# Patient Record
Sex: FEMALE | Race: OTHER | Employment: OTHER | URBAN - METROPOLITAN AREA
[De-identification: names, ages, dates, MRNs, and addresses within clinical notes are randomized per-mention and may not be internally consistent; named-entity substitution may affect disease eponyms.]

---

## 2018-01-19 ENCOUNTER — TELEPHONE (OUTPATIENT)
Dept: OBGYN CLINIC | Facility: CLINIC | Age: 68
End: 2018-01-19

## 2018-01-19 ENCOUNTER — OFFICE VISIT (OUTPATIENT)
Dept: OBGYN CLINIC | Facility: CLINIC | Age: 68
End: 2018-01-19

## 2018-01-19 VITALS
WEIGHT: 169 LBS | HEART RATE: 77 BPM | DIASTOLIC BLOOD PRESSURE: 80 MMHG | SYSTOLIC BLOOD PRESSURE: 165 MMHG | BODY MASS INDEX: 31.1 KG/M2 | HEIGHT: 61.7 IN

## 2018-01-19 DIAGNOSIS — I10 ESSENTIAL HYPERTENSION: ICD-10-CM

## 2018-01-19 DIAGNOSIS — Z01.419 ENCOUNTER FOR GYNECOLOGICAL EXAMINATION WITHOUT ABNORMAL FINDING: Primary | ICD-10-CM

## 2018-01-19 DIAGNOSIS — Z13.820 SCREENING FOR OSTEOPOROSIS: ICD-10-CM

## 2018-01-19 DIAGNOSIS — Z12.31 VISIT FOR SCREENING MAMMOGRAM: ICD-10-CM

## 2018-01-19 PROCEDURE — 99387 INIT PM E/M NEW PAT 65+ YRS: CPT | Performed by: OBSTETRICS & GYNECOLOGY

## 2018-01-19 RX ORDER — METOPROLOL SUCCINATE 25 MG/1
25 TABLET, EXTENDED RELEASE ORAL DAILY
COMMUNITY
End: 2018-02-05 | Stop reason: DRUGHIGH

## 2018-01-19 NOTE — PROGRESS NOTES
Nella Gutiérrez is a 74year old female  No LMP recorded. Patient is postmenopausal. Patient presents with:  Gyn Exam: ANNUAL EXAM-- new pt. Prior Rocio Divers. has not taken 2nd HTN med. Has appt w/ new PCP . No  bleed  .     OBSTETRICS HISTORY: LEVOTHYROXINE SODIUM OR, Take by mouth., Disp: , Rfl:   •  Albuterol Sulfate HFA (PROAIR HFA) 108 (90 BASE) MCG/ACT Inhalation Aero Soln, Inhale 2 puffs into the lungs every 6 (six) hours as needed for Wheezing., Disp: , Rfl:     ALLERGIES:    Seasonal abnormal discharge  Cervix:    normal without tenderness on motion  Uterus:    normal in size, contour, position, mobility, without tenderness  Adnexa:   normal without masses or tenderness  Perineum:   normal  Anus: no hemorroids     Assessment & Plan:  C

## 2018-01-19 NOTE — TELEPHONE ENCOUNTER
Need Dr. Sebastian Prince pap reports from her office chart -- told that [facility] bought all her records -- please obtain pap reports

## 2018-01-22 LAB — HPV I/H RISK 1 DNA SPEC QL NAA+PROBE: NEGATIVE

## 2018-01-23 NOTE — PROGRESS NOTES
Send letter: Normal pap &  Negative high risk HPV. Continue with annual breast / pelvic exams (no more paps).

## 2018-01-24 NOTE — PROGRESS NOTES
Normal pap &  Negative high risk HPV. Continue with annual breast / pelvic exams (no more paps). Letter Sent via mail.

## 2018-02-05 ENCOUNTER — OFFICE VISIT (OUTPATIENT)
Dept: FAMILY MEDICINE CLINIC | Facility: CLINIC | Age: 68
End: 2018-02-05

## 2018-02-05 VITALS
WEIGHT: 172 LBS | SYSTOLIC BLOOD PRESSURE: 163 MMHG | HEART RATE: 77 BPM | DIASTOLIC BLOOD PRESSURE: 77 MMHG | TEMPERATURE: 100 F | HEIGHT: 62 IN | BODY MASS INDEX: 31.65 KG/M2

## 2018-02-05 DIAGNOSIS — J45.20 MILD INTERMITTENT REACTIVE AIRWAY DISEASE WITHOUT COMPLICATION: ICD-10-CM

## 2018-02-05 DIAGNOSIS — Z12.11 COLON CANCER SCREENING: ICD-10-CM

## 2018-02-05 DIAGNOSIS — Z23 NEED FOR 23-POLYVALENT PNEUMOCOCCAL POLYSACCHARIDE VACCINE: ICD-10-CM

## 2018-02-05 DIAGNOSIS — E66.9 OBESITY (BMI 30.0-34.9): ICD-10-CM

## 2018-02-05 DIAGNOSIS — I10 ESSENTIAL HYPERTENSION: ICD-10-CM

## 2018-02-05 DIAGNOSIS — E03.9 HYPOTHYROIDISM, UNSPECIFIED TYPE: ICD-10-CM

## 2018-02-05 DIAGNOSIS — Z00.00 WELL ADULT EXAM: Primary | ICD-10-CM

## 2018-02-05 DIAGNOSIS — Z23 NEED FOR TDAP VACCINATION: ICD-10-CM

## 2018-02-05 PROBLEM — E66.811 OBESITY (BMI 30.0-34.9): Status: ACTIVE | Noted: 2018-02-05

## 2018-02-05 PROCEDURE — 90471 IMMUNIZATION ADMIN: CPT | Performed by: FAMILY MEDICINE

## 2018-02-05 PROCEDURE — 99387 INIT PM E/M NEW PAT 65+ YRS: CPT | Performed by: FAMILY MEDICINE

## 2018-02-05 PROCEDURE — 90732 PPSV23 VACC 2 YRS+ SUBQ/IM: CPT | Performed by: FAMILY MEDICINE

## 2018-02-05 RX ORDER — METOPROLOL SUCCINATE 50 MG/1
50 TABLET, EXTENDED RELEASE ORAL DAILY
Qty: 90 TABLET | Refills: 0 | Status: SHIPPED | OUTPATIENT
Start: 2018-02-05 | End: 2018-04-07

## 2018-02-05 RX ORDER — LEVOTHYROXINE SODIUM 0.05 MG/1
50 TABLET ORAL
COMMUNITY
End: 2018-10-01

## 2018-02-05 RX ORDER — LISINOPRIL AND HYDROCHLOROTHIAZIDE 25; 20 MG/1; MG/1
1 TABLET ORAL DAILY
Qty: 90 TABLET | Refills: 0 | Status: SHIPPED | OUTPATIENT
Start: 2018-02-05 | End: 2018-03-03 | Stop reason: ALTCHOICE

## 2018-02-05 NOTE — PROGRESS NOTES
HPI:   Tavares Willams is a 74year old female who presents for a complete physical exam. Symptoms: is menopausal.    Wt Readings from Last 6 Encounters:  02/05/18 : 172 lb (78 kg)  01/19/18 : 169 lb (76.7 kg)  12/21/16 : 170 lb (77.1 kg)  01/28/14 : 166 lb (7 GENERAL: feels well otherwise  SKIN: denies any unusual skin lesions  EYES:denies blurred vision or double vision  HEENT: denies nasal congestion, sinus pain or ST  LUNGS: denies shortness of breath with exertion  CARDIOVASCULAR: denies chest pain on exe up with dentist every 6 months  Follow up with eye doctor yearly  Exercise for 30mins most days of the week  1/2 - 1 lb weight loss per week: goal 5-10 pounds  Yearly flu shot  Tetanus booster every 10 years    - ALT (SGPT); Future  - AST (SGOT);  Future  -

## 2018-02-10 ENCOUNTER — TELEPHONE (OUTPATIENT)
Dept: FAMILY MEDICINE CLINIC | Facility: CLINIC | Age: 68
End: 2018-02-10

## 2018-02-10 NOTE — TELEPHONE ENCOUNTER
Lisset Kim from [facility] calling because pt is there trying to get labs but there is no order. Pt states that during a recent visit with Dr. Dirk Ashby that she was told by MD that she needed labs done. Please advise.

## 2018-02-17 ENCOUNTER — HOSPITAL ENCOUNTER (OUTPATIENT)
Dept: BONE DENSITY | Age: 68
Discharge: HOME | End: 2018-02-17
Attending: OBSTETRICS & GYNECOLOGY
Payer: COMMERCIAL

## 2018-02-17 ENCOUNTER — HOSPITAL ENCOUNTER (OUTPATIENT)
Dept: MAMMOGRAPHY | Age: 68
Discharge: HOME | End: 2018-02-17
Attending: OBSTETRICS & GYNECOLOGY
Payer: COMMERCIAL

## 2018-02-17 DIAGNOSIS — Z13.820 SCREENING FOR OSTEOPOROSIS: ICD-10-CM

## 2018-02-17 DIAGNOSIS — Z12.31 VISIT FOR SCREENING MAMMOGRAM: ICD-10-CM

## 2018-02-17 PROCEDURE — 77080 DXA BONE DENSITY AXIAL: CPT | Performed by: OBSTETRICS & GYNECOLOGY

## 2018-02-17 PROCEDURE — 77067 SCR MAMMO BI INCL CAD: CPT | Performed by: OBSTETRICS & GYNECOLOGY

## 2018-02-19 ENCOUNTER — TELEPHONE (OUTPATIENT)
Dept: FAMILY MEDICINE CLINIC | Facility: CLINIC | Age: 68
End: 2018-02-19

## 2018-02-19 DIAGNOSIS — N28.9 DECREASED RENAL FUNCTION: Primary | ICD-10-CM

## 2018-02-19 DIAGNOSIS — D64.9 MILD ANEMIA: ICD-10-CM

## 2018-02-19 DIAGNOSIS — E55.9 VITAMIN D DEFICIENCY: ICD-10-CM

## 2018-02-19 NOTE — TELEPHONE ENCOUNTER
Received blood test results from [facility]. done on February 10, 2018. Patient appears to have mild anemia and therefore encourage patient to get the stool blood test.  This has already been ordered.   Her kidney function appears to be slightly elevated wit

## 2018-02-22 NOTE — TELEPHONE ENCOUNTER
Patient notified of lab results. Patient verbalizes understanding of Dr. Deleon Schools instructions.      Instructions for lab results sent in a My Chart message per patient request.

## 2018-03-03 ENCOUNTER — OFFICE VISIT (OUTPATIENT)
Dept: FAMILY MEDICINE CLINIC | Facility: CLINIC | Age: 68
End: 2018-03-03

## 2018-03-03 VITALS
WEIGHT: 172 LBS | HEIGHT: 61.75 IN | SYSTOLIC BLOOD PRESSURE: 130 MMHG | BODY MASS INDEX: 31.65 KG/M2 | DIASTOLIC BLOOD PRESSURE: 72 MMHG | HEART RATE: 66 BPM

## 2018-03-03 DIAGNOSIS — N28.9 DECREASED RENAL FUNCTION: ICD-10-CM

## 2018-03-03 DIAGNOSIS — D64.9 MILD ANEMIA: Primary | ICD-10-CM

## 2018-03-03 PROCEDURE — 99214 OFFICE O/P EST MOD 30 MIN: CPT | Performed by: FAMILY MEDICINE

## 2018-03-03 PROCEDURE — 99212 OFFICE O/P EST SF 10 MIN: CPT | Performed by: FAMILY MEDICINE

## 2018-03-03 RX ORDER — LISINOPRIL 40 MG/1
40 TABLET ORAL DAILY
Qty: 90 TABLET | Refills: 0 | Status: SHIPPED | OUTPATIENT
Start: 2018-03-03 | End: 2018-04-07

## 2018-03-04 NOTE — PROGRESS NOTES
HPI:    Patient ID: Ori Chen is a 74year old female. HPI     Patient here today to discuss test results. She states her colonoscopy is up-to-date. She had it in February 2014. It was normal per patient and is due in 10 years.     Review of Syste check in the next 3-4 weeks. Follow-up in 3 months. Recheck BMP 3 months.  - BASIC METABOLIC PANEL (8);  Future      Orders Placed This Encounter      VITAMIN B12 [927][Q]      CBC [6399] [Q]      IRON AND TIBC [7573] [Q]      BASIC METABOLIC PANEL [17792

## 2018-04-07 DIAGNOSIS — I10 ESSENTIAL HYPERTENSION: Primary | ICD-10-CM

## 2018-04-10 RX ORDER — LISINOPRIL 40 MG/1
40 TABLET ORAL DAILY
Qty: 90 TABLET | Refills: 0 | Status: SHIPPED
Start: 2018-04-10 | End: 2018-06-30

## 2018-04-10 RX ORDER — METOPROLOL SUCCINATE 50 MG/1
50 TABLET, EXTENDED RELEASE ORAL DAILY
Qty: 90 TABLET | Refills: 0 | Status: SHIPPED
Start: 2018-04-10 | End: 2018-06-30

## 2018-04-10 NOTE — TELEPHONE ENCOUNTER
From: Fernando So  Sent: 4/7/2018 8:56 AM CDT  Subject: Medication Renewal Request    Fernando So would like a refill of the following medications:     Metoprolol Succinate ER 50 MG Oral Tablet 24 Hr [Marcell Cardoza MD]     lisinopril 40 MG Oral Tab [Marcell Cardoza MD]    Preferred pharmacy: [address]  [phone], [phone]

## 2018-06-30 ENCOUNTER — OFFICE VISIT (OUTPATIENT)
Dept: FAMILY MEDICINE CLINIC | Facility: CLINIC | Age: 68
End: 2018-06-30

## 2018-06-30 VITALS
TEMPERATURE: 98 F | WEIGHT: 167 LBS | BODY MASS INDEX: 30.73 KG/M2 | HEART RATE: 64 BPM | HEIGHT: 62 IN | SYSTOLIC BLOOD PRESSURE: 167 MMHG | DIASTOLIC BLOOD PRESSURE: 74 MMHG

## 2018-06-30 DIAGNOSIS — E03.9 HYPOTHYROIDISM, UNSPECIFIED TYPE: Primary | ICD-10-CM

## 2018-06-30 DIAGNOSIS — D64.9 MILD ANEMIA: ICD-10-CM

## 2018-06-30 DIAGNOSIS — I10 ESSENTIAL HYPERTENSION: ICD-10-CM

## 2018-06-30 DIAGNOSIS — N28.9 DECREASED RENAL FUNCTION: ICD-10-CM

## 2018-06-30 PROCEDURE — 99212 OFFICE O/P EST SF 10 MIN: CPT | Performed by: FAMILY MEDICINE

## 2018-06-30 PROCEDURE — 99214 OFFICE O/P EST MOD 30 MIN: CPT | Performed by: FAMILY MEDICINE

## 2018-06-30 RX ORDER — HYDROCHLOROTHIAZIDE 25 MG/1
25 TABLET ORAL EVERY MORNING
Qty: 90 TABLET | Refills: 0 | Status: SHIPPED | OUTPATIENT
Start: 2018-06-30 | End: 2018-09-22

## 2018-06-30 RX ORDER — LISINOPRIL 40 MG/1
40 TABLET ORAL DAILY
Qty: 90 TABLET | Refills: 0 | Status: SHIPPED | OUTPATIENT
Start: 2018-06-30 | End: 2018-08-18

## 2018-06-30 RX ORDER — METOPROLOL SUCCINATE 50 MG/1
50 TABLET, EXTENDED RELEASE ORAL DAILY
Qty: 90 TABLET | Refills: 0 | Status: SHIPPED | OUTPATIENT
Start: 2018-06-30 | End: 2018-10-27

## 2018-06-30 NOTE — PROGRESS NOTES
Ori Chen is a 74year old female. HPI:   Patient presents for recheck of her hypertension.  Pt has been taking medications as instructed, no medication side effects,     Denies any complaints    Feels well  Had labs in March 2018    Wt Readings from exertion  CARDIOVASCULAR: denies chest pain on exertion  GI: denies abdominal pain and denies heartburn  NEURO: denies headaches    EXAM:   BP (!) 167/74   Pulse 64   Temp 98 °F (36.7 °C) (Oral)   Ht 5' 2\" (1.575 m)   Wt 167 lb (75.8 kg)   BMI 30.54 kg/m²

## 2018-08-18 DIAGNOSIS — I10 ESSENTIAL HYPERTENSION: ICD-10-CM

## 2018-08-18 RX ORDER — LISINOPRIL 40 MG/1
40 TABLET ORAL DAILY
Qty: 30 TABLET | Refills: 0 | Status: SHIPPED | OUTPATIENT
Start: 2018-08-18 | End: 2018-08-27

## 2018-08-18 NOTE — TELEPHONE ENCOUNTER
From: Yeimy Gilbert  Sent: 8/18/2018 9:38 AM CDT  Subject: Medication Renewal Request    Yeimy Gilbert would like a refill of the following medications:     lisinopril 40 MG Oral Tab [Marcell Cardoza MD]    Preferred pharmacy: [address].  [phone], [phone]

## 2018-08-18 NOTE — TELEPHONE ENCOUNTER
Please review and advise regarding pended refill request as unable to refill per protocol. MyChart message has been sent asking pt to complete labs ordered at [address] and also to schedule appt as per 6/30/18 OV notes was supposed to RTC in 2-3 weeks since BP was elevated.

## 2018-08-20 ENCOUNTER — TELEPHONE (OUTPATIENT)
Dept: OTHER | Age: 68
End: 2018-08-20

## 2018-08-20 NOTE — TELEPHONE ENCOUNTER
Called pt and told her we did not  receive those labs pt will call them again to refax it to us.  Pt wanted to know if she should still continue her recent medication

## 2018-08-20 NOTE — TELEPHONE ENCOUNTER
Pt states she received a message in [employer] stating she needs her labs done and f/u appt. Pt states she had her labs done at end of June at [facility], [address] states she never received results. Dr. Landon Antunez, did you receive lab results from [facility] 6/30/18?  Lab c

## 2018-08-20 NOTE — TELEPHONE ENCOUNTER
Patient is asking if she should be taking the hydrochlorothiazide because she states she was not aware that she was supposed to be taking it. Please advise.      Also Patient also states she called [employer] and they gave her another number that doctors offi

## 2018-08-20 NOTE — TELEPHONE ENCOUNTER
Dalia and was advised that they have no test results on this patient in June, last tests they have on record was from February.  Contacted patient and advised, patient will follow up with [employer].

## 2018-08-21 NOTE — TELEPHONE ENCOUNTER
pls have patient follow up for her bp and can straighten out details then  Thank you for trying to get the labs

## 2018-08-22 NOTE — TELEPHONE ENCOUNTER
Pt states was told to have labs redone by [facility]. States plan on redoing then Saturday. Scheduled BP appt for 8/27 with AMA.

## 2018-08-25 NOTE — TELEPHONE ENCOUNTER
Patient called, needs orders to be sent to [facility], only at [facility] chart, not at [facility]. 5 lab orders faxed to [facility] today at 8:49 to [phone] at patient's request.

## 2018-08-27 ENCOUNTER — OFFICE VISIT (OUTPATIENT)
Dept: FAMILY MEDICINE CLINIC | Facility: CLINIC | Age: 68
End: 2018-08-27
Payer: COMMERCIAL

## 2018-08-27 VITALS
WEIGHT: 166 LBS | HEIGHT: 62 IN | TEMPERATURE: 99 F | DIASTOLIC BLOOD PRESSURE: 71 MMHG | SYSTOLIC BLOOD PRESSURE: 147 MMHG | HEART RATE: 71 BPM | BODY MASS INDEX: 30.55 KG/M2

## 2018-08-27 DIAGNOSIS — I10 ESSENTIAL HYPERTENSION: Primary | ICD-10-CM

## 2018-08-27 DIAGNOSIS — E03.9 HYPOTHYROIDISM, UNSPECIFIED TYPE: ICD-10-CM

## 2018-08-27 DIAGNOSIS — N28.9 DECREASED RENAL FUNCTION: ICD-10-CM

## 2018-08-27 DIAGNOSIS — D64.9 MILD ANEMIA: ICD-10-CM

## 2018-08-27 PROCEDURE — 99213 OFFICE O/P EST LOW 20 MIN: CPT | Performed by: FAMILY MEDICINE

## 2018-08-27 PROCEDURE — 99212 OFFICE O/P EST SF 10 MIN: CPT | Performed by: FAMILY MEDICINE

## 2018-08-27 RX ORDER — LISINOPRIL 40 MG/1
40 TABLET ORAL DAILY
Qty: 90 TABLET | Refills: 0 | Status: SHIPPED | OUTPATIENT
Start: 2018-08-27 | End: 2019-09-16

## 2018-08-27 NOTE — PROGRESS NOTES
Cheo Vogel is a 74year old female. HPI:   Patient presents for recheck of her hypertension. Pt has been taking medications as instructed, no medication side effects,     .was taking all bp meds.   Stopped hctz few day ago as got confused if she was alvarado otherwise  SKIN: denies any unusual skin lesions or rashes  RESPIRATORY: denies shortness of breath with exertion  CARDIOVASCULAR: denies chest pain on exertion  GI: denies abdominal pain and denies heartburn  NEURO: denies headaches    EXAM:   /71

## 2018-08-29 ENCOUNTER — TELEPHONE (OUTPATIENT)
Dept: OTHER | Age: 68
End: 2018-08-29

## 2018-08-29 DIAGNOSIS — R73.9 HYPERGLYCEMIA: ICD-10-CM

## 2018-08-29 DIAGNOSIS — N18.30 STAGE 3 CHRONIC KIDNEY DISEASE (HCC): Primary | ICD-10-CM

## 2018-08-29 NOTE — TELEPHONE ENCOUNTER
Pt stated she called [facility]/advised they faxed results yesterday       Were Results received ?   Please reply to pool: RENETTA Ayala

## 2018-08-30 NOTE — TELEPHONE ENCOUNTER
Routed to clinic pool and provider to confirm if results were received.      Please reply to pool: RENETTA Menjivar

## 2018-09-06 PROBLEM — N18.30 STAGE 3 CHRONIC KIDNEY DISEASE (HCC): Status: ACTIVE | Noted: 2018-09-06

## 2018-09-06 NOTE — TELEPHONE ENCOUNTER
Labs received from [facility] dated August 25, 2018. Blood counts normal including hemoglobin is stable at 11.4. Iron levels are good. Thyroid is normal.  Kidney function still remains low and unchanged when compared to labs from before.   Creatinine lev

## 2018-09-06 NOTE — TELEPHONE ENCOUNTER
Called pt and informed her of message below pt stated she had a copy of her results already.  Mailed out her A1C order

## 2018-09-24 RX ORDER — HYDROCHLOROTHIAZIDE 25 MG/1
TABLET ORAL
Qty: 90 TABLET | Refills: 0 | Status: SHIPPED | OUTPATIENT
Start: 2018-09-24 | End: 2018-12-28

## 2018-10-01 RX ORDER — LEVOTHYROXINE SODIUM 0.05 MG/1
50 TABLET ORAL
Qty: 90 TABLET | Refills: 3 | Status: SHIPPED | OUTPATIENT
Start: 2018-10-01 | End: 2019-09-16

## 2018-10-01 NOTE — TELEPHONE ENCOUNTER
please advise as does not meet RN protocol for refill criteria  Listed as historical    Hypothyroid Medications  Protocol Criteria:  Appointment scheduled in the past 12 months or the next 3 months  TSH resulted in the past 12 months that is normal  Recent Outpatient Visits            1 month ago Essential hypertension    [address] [facility]    Office Visit    3 months ago Hypothyroidism, unspecified type    [address] [facility]    Office Visit    7 months ago Mild anemia    [address] [facility]    Office Visit    7 months ago Well adult exam    [address] [facility]    Office Visit    8 months ago Encounter for gynecological examination without abnormal finding    [facility] for Apryl Sandhu MD    Office Visit        Future Appointments       Provider Department Appt Notes    In 1 week Sakshi Jamil MD [facility] [place], [facility], [address], El[place] 1 mo        No results found for: [place], [place]

## 2018-10-03 ENCOUNTER — HOSPITAL ENCOUNTER (OUTPATIENT)
Dept: GENERAL RADIOLOGY | Age: 68
Discharge: HOME | End: 2018-10-03
Attending: FAMILY MEDICINE

## 2018-10-03 ENCOUNTER — OFFICE VISIT (OUTPATIENT)
Dept: OCCUPATIONAL MEDICINE | Age: 68
End: 2018-10-03
Attending: FAMILY MEDICINE

## 2018-10-03 DIAGNOSIS — S63.91XA HAND SPRAIN, RIGHT, INITIAL ENCOUNTER: Primary | ICD-10-CM

## 2018-10-03 DIAGNOSIS — S63.501A WRIST SPRAIN, RIGHT, INITIAL ENCOUNTER: ICD-10-CM

## 2018-10-03 DIAGNOSIS — S63.91XA HAND SPRAIN, RIGHT, INITIAL ENCOUNTER: ICD-10-CM

## 2018-10-03 PROCEDURE — 73110 X-RAY EXAM OF WRIST: CPT | Performed by: FAMILY MEDICINE

## 2018-10-03 PROCEDURE — 73130 X-RAY EXAM OF HAND: CPT | Performed by: FAMILY MEDICINE

## 2018-10-05 ENCOUNTER — APPOINTMENT (OUTPATIENT)
Dept: OCCUPATIONAL MEDICINE | Age: 68
End: 2018-10-05
Attending: FAMILY MEDICINE

## 2018-10-09 ENCOUNTER — OFFICE VISIT (OUTPATIENT)
Dept: FAMILY MEDICINE CLINIC | Facility: CLINIC | Age: 68
End: 2018-10-09
Payer: COMMERCIAL

## 2018-10-09 VITALS
HEIGHT: 62 IN | BODY MASS INDEX: 30.36 KG/M2 | WEIGHT: 165 LBS | DIASTOLIC BLOOD PRESSURE: 78 MMHG | HEART RATE: 66 BPM | TEMPERATURE: 98 F | SYSTOLIC BLOOD PRESSURE: 151 MMHG

## 2018-10-09 DIAGNOSIS — I10 ESSENTIAL HYPERTENSION: Primary | ICD-10-CM

## 2018-10-09 PROCEDURE — 99213 OFFICE O/P EST LOW 20 MIN: CPT | Performed by: FAMILY MEDICINE

## 2018-10-09 PROCEDURE — 99212 OFFICE O/P EST SF 10 MIN: CPT | Performed by: FAMILY MEDICINE

## 2018-10-09 NOTE — PROGRESS NOTES
Tyler Salamanca is a 79year old female. HPI:   Patient presents for recheck of her hypertension.  Pt has been taking medications as instructed, no medication side effects,  Bps at home usu 140/70    Has HbA1C done at [facility] 9/29/18- 5.6 % normal    Wt Georgina Mccracken with exertion  CARDIOVASCULAR: denies chest pain on exertion  GI: denies abdominal pain and denies heartburn  NEURO: denies headaches    EXAM:   /78   Pulse 73   Temp 98.4 °F (36.9 °C) (Oral)   Ht 5' 2\" (1.575 m)   Wt 165 lb (74.8 kg)   BMI 30.18 kg

## 2018-10-12 ENCOUNTER — APPOINTMENT (OUTPATIENT)
Dept: OCCUPATIONAL MEDICINE | Age: 68
End: 2018-10-12
Attending: FAMILY MEDICINE

## 2018-10-19 ENCOUNTER — APPOINTMENT (OUTPATIENT)
Dept: OCCUPATIONAL MEDICINE | Age: 68
End: 2018-10-19
Attending: ORTHOPAEDIC SURGERY

## 2018-10-27 DIAGNOSIS — I10 ESSENTIAL HYPERTENSION: ICD-10-CM

## 2018-10-28 RX ORDER — METOPROLOL SUCCINATE 50 MG/1
TABLET, EXTENDED RELEASE ORAL
Qty: 90 TABLET | Refills: 0 | Status: SHIPPED | OUTPATIENT
Start: 2018-10-28 | End: 2018-12-28

## 2018-12-28 DIAGNOSIS — I10 ESSENTIAL HYPERTENSION: ICD-10-CM

## 2018-12-29 RX ORDER — METOPROLOL SUCCINATE 50 MG/1
TABLET, EXTENDED RELEASE ORAL
Qty: 90 TABLET | Refills: 0 | Status: SHIPPED | OUTPATIENT
Start: 2018-12-29 | End: 2019-04-01

## 2018-12-29 RX ORDER — LISINOPRIL 40 MG/1
TABLET ORAL
Qty: 90 TABLET | Refills: 0 | Status: SHIPPED | OUTPATIENT
Start: 2018-12-29 | End: 2019-02-25

## 2018-12-29 RX ORDER — HYDROCHLOROTHIAZIDE 25 MG/1
TABLET ORAL
Qty: 90 TABLET | Refills: 0 | Status: SHIPPED | OUTPATIENT
Start: 2018-12-29 | End: 2019-04-14

## 2019-02-25 ENCOUNTER — APPOINTMENT (OUTPATIENT)
Dept: LAB | Age: 69
End: 2019-02-25
Attending: FAMILY MEDICINE
Payer: MEDICARE

## 2019-02-25 ENCOUNTER — OFFICE VISIT (OUTPATIENT)
Dept: FAMILY MEDICINE CLINIC | Facility: CLINIC | Age: 69
End: 2019-02-25
Payer: COMMERCIAL

## 2019-02-25 VITALS
SYSTOLIC BLOOD PRESSURE: 176 MMHG | TEMPERATURE: 98 F | BODY MASS INDEX: 31.1 KG/M2 | HEIGHT: 62 IN | WEIGHT: 169 LBS | DIASTOLIC BLOOD PRESSURE: 83 MMHG | HEART RATE: 64 BPM

## 2019-02-25 DIAGNOSIS — E55.9 VITAMIN D DEFICIENCY: ICD-10-CM

## 2019-02-25 DIAGNOSIS — Z00.00 ENCOUNTER FOR ANNUAL HEALTH EXAMINATION: ICD-10-CM

## 2019-02-25 DIAGNOSIS — Z80.3 FH: BREAST CANCER IN FIRST DEGREE RELATIVE: ICD-10-CM

## 2019-02-25 DIAGNOSIS — D64.9 MILD ANEMIA: ICD-10-CM

## 2019-02-25 DIAGNOSIS — Z01.419 WELL WOMAN EXAM WITH ROUTINE GYNECOLOGICAL EXAM: ICD-10-CM

## 2019-02-25 DIAGNOSIS — I83.813 VARICOSE VEINS OF BOTH LOWER EXTREMITIES WITH PAIN: ICD-10-CM

## 2019-02-25 DIAGNOSIS — I10 ESSENTIAL HYPERTENSION: ICD-10-CM

## 2019-02-25 DIAGNOSIS — Z11.59 NEED FOR HEPATITIS C SCREENING TEST: ICD-10-CM

## 2019-02-25 DIAGNOSIS — Z00.00 MEDICARE ANNUAL WELLNESS VISIT, SUBSEQUENT: Primary | ICD-10-CM

## 2019-02-25 DIAGNOSIS — Z23 NEED FOR VACCINATION: ICD-10-CM

## 2019-02-25 DIAGNOSIS — E03.9 HYPOTHYROIDISM, UNSPECIFIED TYPE: ICD-10-CM

## 2019-02-25 DIAGNOSIS — Z12.31 ENCOUNTER FOR SCREENING MAMMOGRAM FOR BREAST CANCER: ICD-10-CM

## 2019-02-25 DIAGNOSIS — N18.30 STAGE 3 CHRONIC KIDNEY DISEASE (HCC): ICD-10-CM

## 2019-02-25 LAB
ALBUMIN SERPL-MCNC: 3.7 G/DL (ref 3.4–5)
ALBUMIN/GLOB SERPL: 0.9 {RATIO} (ref 1–2)
ALP LIVER SERPL-CCNC: 92 U/L (ref 55–142)
ALT SERPL-CCNC: 17 U/L (ref 13–56)
ANION GAP SERPL CALC-SCNC: 5 MMOL/L (ref 0–18)
AST SERPL-CCNC: 16 U/L (ref 15–37)
BILIRUB SERPL-MCNC: 0.4 MG/DL (ref 0.1–2)
BUN BLD-MCNC: 40 MG/DL (ref 7–18)
BUN/CREAT SERPL: 28.2 (ref 10–20)
CALCIUM BLD-MCNC: 9.6 MG/DL (ref 8.5–10.1)
CHLORIDE SERPL-SCNC: 108 MMOL/L (ref 98–107)
CO2 SERPL-SCNC: 27 MMOL/L (ref 21–32)
CREAT BLD-MCNC: 1.42 MG/DL (ref 0.55–1.02)
GLOBULIN PLAS-MCNC: 4.1 G/DL (ref 2.8–4.4)
GLUCOSE BLD-MCNC: 97 MG/DL (ref 70–99)
M PROTEIN MFR SERPL ELPH: 7.8 G/DL (ref 6.4–8.2)
OSMOLALITY SERPL CALC.SUM OF ELEC: 300 MOSM/KG (ref 275–295)
POTASSIUM SERPL-SCNC: 4.9 MMOL/L (ref 3.5–5.1)
SODIUM SERPL-SCNC: 140 MMOL/L (ref 136–145)
TSI SER-ACNC: 2.59 MIU/ML (ref 0.36–3.74)

## 2019-02-25 PROCEDURE — 84443 ASSAY THYROID STIM HORMONE: CPT

## 2019-02-25 PROCEDURE — 90471 IMMUNIZATION ADMIN: CPT | Performed by: FAMILY MEDICINE

## 2019-02-25 PROCEDURE — 93005 ELECTROCARDIOGRAM TRACING: CPT

## 2019-02-25 PROCEDURE — 86803 HEPATITIS C AB TEST: CPT

## 2019-02-25 PROCEDURE — 99397 PER PM REEVAL EST PAT 65+ YR: CPT | Performed by: FAMILY MEDICINE

## 2019-02-25 PROCEDURE — 93010 ELECTROCARDIOGRAM REPORT: CPT | Performed by: FAMILY MEDICINE

## 2019-02-25 PROCEDURE — 90670 PCV13 VACCINE IM: CPT | Performed by: FAMILY MEDICINE

## 2019-02-25 PROCEDURE — 36415 COLL VENOUS BLD VENIPUNCTURE: CPT

## 2019-02-25 PROCEDURE — 80053 COMPREHEN METABOLIC PANEL: CPT

## 2019-02-25 NOTE — PATIENT INSTRUCTIONS
Radha Wells's SCREENING SCHEDULE   Tests on this list are recommended by your physician but may not be covered, or covered at this frequency, by your insurer. Please check with your insurance carrier before scheduling to verify coverage.    PREVENTATIVE S for this or any previous visit. No flowsheet data found. Fecal Occult Blood   Covered Annually No results found for: FOB, OCCULTSTOOL No flowsheet data found.      Barium Enema-   uncomfortable but covered  Covered but uncomfortable   Glaucoma Screening birthday    Hepatitis B for Moderate/High Risk       No orders found for this or any previous visit.  Medium/high risk factors:   End-stage renal disease   Hemophiliacs who received Factor VIII or IX concentrates   Clients of institutions for the mentally r problems worse. Your healthcare provider is recommending a low-salt (also called low-sodium) diet for you. Your total daily allowance of salt is 1,500 to 2,300 milligrams (mg). It is less than 1 teaspoon of table salt.  This means you can have only about 50 sardines, or anchovies. Seasonings and spices  Ok: Most seasonings are okay.  Good substitutes for salt include: fresh herb blends, hot sauce, lemon, garlic, arnett, vinegar, dry mustard, parsley, cilantro, horseradish, tomato paste, regular margarine, mota

## 2019-02-25 NOTE — PROGRESS NOTES
HPI:   Nella Gutiérrez is a 79year old female who presents for a Medicare Subsequent Annual Wellness visit (Pt already had Initial Annual Wellness).       Annual Physical due on 02/25/2020        Fall/Risk Assessment   She has been screened for Falls and is the explanation and discussion of advance directives standard forms performed Face to Face with patient and Family/surrogate (if present), and forms available to patient in AVS       She does NOT have a Power of  for [employer] on file in [address] [place] [address] Rd. into the lungs every 6 (six) hours as needed for Wheezing. MEDICAL INFORMATION:   She  has a past medical history of Allergic rhinitis, Asthma, Essential hypertension, and Hypothyroidism.     She  has a past surgical history that includes colposcopy, c no murmur, rub, or gallop   Abdomen:   Soft, non-tender, bowel sounds active all four quadrants,  no masses, no organomegaly   Pelvic: See below   Extremities: Extremities normal, atraumatic, no cyanosis or edema   Pulses: 2+ and symmetric   Skin: Skin col hypertension  Elevated today as patient rushed to her appointment and was lost  Follow up 1 week  - EKG 12-LEAD; Future  - COMP METABOLIC PANEL (14); Future    3. Hypothyroidism, unspecified type    - TSH W REFLEX TO FREE T4; Future    4.  Stage 3 chronic k Sigmoidoscopy Screen every 10 years No results found for this or any previous visit. No flowsheet data found. Fecal Occult Blood Annually No results found for: FOB No flowsheet data found.     Glaucoma Screening      Ophthalmology Visit Annually: Manuel Roberts prescription benefits      SPECIFIC DISEASE MONITORING Internal Lab or Procedure External Lab or Procedure      Annual Monitoring of Persistent     Medications (ACE/ARB, digoxin diuretics, anticonvulsants.)    Potassium  Annually No results found for: K N

## 2019-02-26 ENCOUNTER — HOSPITAL ENCOUNTER (EMERGENCY)
Facility: HOSPITAL | Age: 69
Discharge: HOME | End: 2019-02-26
Attending: EMERGENCY MEDICINE
Payer: MEDICARE

## 2019-02-26 ENCOUNTER — TELEPHONE (OUTPATIENT)
Dept: FAMILY MEDICINE CLINIC | Facility: CLINIC | Age: 69
End: 2019-02-26

## 2019-02-26 VITALS
OXYGEN SATURATION: 99 % | WEIGHT: 167 LBS | RESPIRATION RATE: 18 BRPM | DIASTOLIC BLOOD PRESSURE: 69 MMHG | HEIGHT: 62 IN | HEART RATE: 78 BPM | BODY MASS INDEX: 30.73 KG/M2 | TEMPERATURE: 98 F | SYSTOLIC BLOOD PRESSURE: 159 MMHG

## 2019-02-26 DIAGNOSIS — R94.31 ABNORMAL EKG: Primary | ICD-10-CM

## 2019-02-26 LAB — HCV AB SERPL QL IA: NONREACTIVE

## 2019-02-26 PROCEDURE — 93005 ELECTROCARDIOGRAM TRACING: CPT

## 2019-02-26 PROCEDURE — 99283 EMERGENCY DEPT VISIT LOW MDM: CPT

## 2019-02-26 PROCEDURE — 93010 ELECTROCARDIOGRAM REPORT: CPT | Performed by: INTERNAL MEDICINE

## 2019-02-26 NOTE — ED PROVIDER NOTES
Patient Seen in: [facility] AND [facility] Emergency Department    History   Patient presents with:  Abnormal Result (metabolic, cardiac)      HPI    Patient presents to the ED after police arrived her house and informed her that she had to go to the ER.   Katarina reviewed from today, pertinent positives to the presenting problem noted.     Physical Exam     ED Triage Vitals   BP 02/26/19 1430 (!) 178/76   Pulse 02/26/19 1430 84   Resp 02/26/19 1430 20   Temp 02/26/19 1430 98 °F (36.7 °C)   Temp src 02/26/19 1430 Ora I personally reviewed available prior medical records for any recent pertinent discharge summaries, testing, and procedures and reviewed those reports. Complicating Factors: The patient already has has Essential hypertension;  Hypothyroidism; Obesity (BM

## 2019-02-26 NOTE — TELEPHONE ENCOUNTER
Well being check initiated with the [facility] police department.

## 2019-02-26 NOTE — TELEPHONE ENCOUNTER
Result Notes for EKG 12-LEAD     Notes recorded by Marli Sommer MD on 2/25/2019 at 8:39 PM CST  called and left a detailed message to proceed to ER due to abnormal ekg. No answer  Called and left msg on emergency spouse number as well.   Staff please foll

## 2019-02-26 NOTE — TELEPHONE ENCOUNTER
Patient called the office after the police cam to her home. She rpeorts issues with her phone. RN instructed on the EKG results and Dr. Garrett Fuelling recommendation to go to ER. Patient denies chest pain at this time.   RN advised that due to the elevated BP duri

## 2019-02-26 NOTE — TELEPHONE ENCOUNTER
LMTCB=to patient's VM and 's VM . Transfer to triage. Called work number and just keep ringing and then busy signal.  [address] and they gave me the same home number.   Called patient again and left a message to go to ER per Dr Song Tavares

## 2019-02-26 NOTE — ED INITIAL ASSESSMENT (HPI)
Pt to ER by PCP for \"abnormal EKG\" done with routine visit yesterday. Pt denies chest pain or sob. Pt denies complaints. Pt states PCP attempted to contact her since yesterday but phone was not charged.  Pt states police showed up at her door for well anna marie

## 2019-02-27 NOTE — TELEPHONE ENCOUNTER
Was at ER yesterday 2/26/18. CSS=please call patient and assists with FU OV.     Clinical Impression:  Abnormal EKG  (primary encounter diagnosis)     Disposition:  Discharge     Follow-up:  Ayden Can, [place] [phone]

## 2019-02-28 ENCOUNTER — OFFICE VISIT (OUTPATIENT)
Dept: FAMILY MEDICINE CLINIC | Facility: CLINIC | Age: 69
End: 2019-02-28
Payer: COMMERCIAL

## 2019-02-28 VITALS
HEIGHT: 62 IN | DIASTOLIC BLOOD PRESSURE: 77 MMHG | WEIGHT: 169 LBS | BODY MASS INDEX: 31.1 KG/M2 | TEMPERATURE: 98 F | SYSTOLIC BLOOD PRESSURE: 161 MMHG | HEART RATE: 70 BPM

## 2019-02-28 DIAGNOSIS — N18.30 STAGE 3 CHRONIC KIDNEY DISEASE (HCC): ICD-10-CM

## 2019-02-28 DIAGNOSIS — I10 ESSENTIAL HYPERTENSION: ICD-10-CM

## 2019-02-28 DIAGNOSIS — N28.9 DECREASED RENAL FUNCTION: ICD-10-CM

## 2019-02-28 DIAGNOSIS — Z82.49 FH: CAD (CORONARY ARTERY DISEASE): ICD-10-CM

## 2019-02-28 DIAGNOSIS — R94.31 ABNORMAL EKG: Primary | ICD-10-CM

## 2019-02-28 PROCEDURE — 99212 OFFICE O/P EST SF 10 MIN: CPT | Performed by: FAMILY MEDICINE

## 2019-02-28 PROCEDURE — 99214 OFFICE O/P EST MOD 30 MIN: CPT | Performed by: FAMILY MEDICINE

## 2019-02-28 NOTE — PROGRESS NOTES
HPI:    Patient ID: Trav Weiss is a 79year old female. HPI  Patient presents with:  ER F/U: abnormal EKG       Patient here for follow up    Seen in ER and told all ok  Discharged to follow up with pcp      C/o sneezing/ runny nose.       Review of Sys wheezes. Musculoskeletal: She exhibits no edema. Neurological: She is alert and oriented to person, place, and time. Skin: Skin is warm and dry. No rash noted.               ASSESSMENT/PLAN:   Abnormal ekg  (primary encounter diagnosis)  Essential hyp

## 2019-03-05 ENCOUNTER — APPOINTMENT (OUTPATIENT)
Dept: LAB | Facility: HOSPITAL | Age: 69
End: 2019-03-05
Attending: INTERNAL MEDICINE
Payer: MEDICARE

## 2019-03-05 ENCOUNTER — OFFICE VISIT (OUTPATIENT)
Dept: NEPHROLOGY | Facility: CLINIC | Age: 69
End: 2019-03-05
Payer: MEDICARE

## 2019-03-05 VITALS
WEIGHT: 169.81 LBS | HEIGHT: 62 IN | DIASTOLIC BLOOD PRESSURE: 77 MMHG | SYSTOLIC BLOOD PRESSURE: 170 MMHG | HEART RATE: 75 BPM | BODY MASS INDEX: 31.25 KG/M2 | TEMPERATURE: 98 F

## 2019-03-05 DIAGNOSIS — N18.30 CKD (CHRONIC KIDNEY DISEASE), STAGE III (HCC): Primary | ICD-10-CM

## 2019-03-05 DIAGNOSIS — N18.30 CKD (CHRONIC KIDNEY DISEASE), STAGE III (HCC): ICD-10-CM

## 2019-03-05 LAB
BILIRUB UR QL: NEGATIVE
CLARITY UR: CLEAR
COLOR UR: YELLOW
CREAT UR-SCNC: 45.8 MG/DL
GLUCOSE UR-MCNC: NEGATIVE MG/DL
HGB UR QL STRIP.AUTO: NEGATIVE
KETONES UR-MCNC: NEGATIVE MG/DL
LEUKOCYTE ESTERASE UR QL STRIP.AUTO: NEGATIVE
MICROALBUMIN UR-MCNC: 1.04 MG/DL
MICROALBUMIN/CREAT 24H UR-RTO: 22.7 UG/MG (ref ?–30)
NITRITE UR QL STRIP.AUTO: NEGATIVE
PH UR: 6 [PH] (ref 5–8)
PROT UR-MCNC: <5 MG/DL
PROT UR-MCNC: NEGATIVE MG/DL
SP GR UR STRIP: 1.01 (ref 1–1.03)
UROBILINOGEN UR STRIP-ACNC: <2
VIT C UR-MCNC: NEGATIVE MG/DL

## 2019-03-05 PROCEDURE — 82570 ASSAY OF URINE CREATININE: CPT

## 2019-03-05 PROCEDURE — 82043 UR ALBUMIN QUANTITATIVE: CPT

## 2019-03-05 PROCEDURE — 99204 OFFICE O/P NEW MOD 45 MIN: CPT | Performed by: INTERNAL MEDICINE

## 2019-03-05 PROCEDURE — 84156 ASSAY OF PROTEIN URINE: CPT

## 2019-03-05 PROCEDURE — 81003 URINALYSIS AUTO W/O SCOPE: CPT

## 2019-03-05 PROCEDURE — G0463 HOSPITAL OUTPT CLINIC VISIT: HCPCS | Performed by: INTERNAL MEDICINE

## 2019-03-05 NOTE — PROGRESS NOTES
Consult Requested By: Dr. Matthias Gary    Reason for Consult: CKD     HPI:     Patient is a 79 yrs old female with pmh of HTN x 20 yrs, Hypothyroidism, Asthma who presented for work up and evaluation of kidney disease     Started following with Dr. Lisandra Mitchell i tablet Rfl: 3   lisinopril 40 MG Oral Tab Take 1 tablet (40 mg total) by mouth daily.  Disp: 90 tablet Rfl: 0   Albuterol Sulfate HFA (PROAIR HFA) 108 (90 BASE) MCG/ACT Inhalation Aero Soln Inhale 2 puffs into the lungs every 6 (six) hours as needed for [facility] eGFR 39 ml/min. Albumin and calcium wnl.   - Creatinine from 2/2018 1.37 mg/dl with an eGFR 41 ml/min.    - CKD presumably secondary to hypertensive nephrosclerosis   - UA, urine albumin and protein   - further work up pending above  -avoid nephrotoxins   -

## 2019-03-05 NOTE — PATIENT INSTRUCTIONS
Urine test as ordered  Kidney ultrasound - call to make an appointment   Follow up in 4 weeks  Bring / fax old labs

## 2019-03-06 ENCOUNTER — HOSPITAL ENCOUNTER (OUTPATIENT)
Dept: MAMMOGRAPHY | Age: 69
Discharge: HOME | End: 2019-03-06
Attending: FAMILY MEDICINE
Payer: MEDICARE

## 2019-03-06 DIAGNOSIS — Z80.3 FH: BREAST CANCER IN FIRST DEGREE RELATIVE: ICD-10-CM

## 2019-03-06 DIAGNOSIS — Z12.31 ENCOUNTER FOR SCREENING MAMMOGRAM FOR BREAST CANCER: ICD-10-CM

## 2019-03-06 PROCEDURE — 77067 SCR MAMMO BI INCL CAD: CPT | Performed by: FAMILY MEDICINE

## 2019-03-06 PROCEDURE — 77063 BREAST TOMOSYNTHESIS BI: CPT | Performed by: FAMILY MEDICINE

## 2019-03-07 ENCOUNTER — HOSPITAL ENCOUNTER (OUTPATIENT)
Dept: NUCLEAR MEDICINE | Facility: HOSPITAL | Age: 69
Discharge: HOME | End: 2019-03-07
Attending: FAMILY MEDICINE
Payer: MEDICARE

## 2019-03-07 ENCOUNTER — HOSPITAL ENCOUNTER (OUTPATIENT)
Dept: CV DIAGNOSTICS | Facility: HOSPITAL | Age: 69
Discharge: HOME | End: 2019-03-07
Attending: FAMILY MEDICINE
Payer: MEDICARE

## 2019-03-07 DIAGNOSIS — I10 ESSENTIAL HYPERTENSION: ICD-10-CM

## 2019-03-07 DIAGNOSIS — Z82.49 FH: CAD (CORONARY ARTERY DISEASE): ICD-10-CM

## 2019-03-07 DIAGNOSIS — R94.31 ABNORMAL EKG: ICD-10-CM

## 2019-03-07 PROCEDURE — 93016 CV STRESS TEST SUPVJ ONLY: CPT | Performed by: FAMILY MEDICINE

## 2019-03-07 PROCEDURE — 78452 HT MUSCLE IMAGE SPECT MULT: CPT | Performed by: FAMILY MEDICINE

## 2019-03-07 PROCEDURE — 93017 CV STRESS TEST TRACING ONLY: CPT | Performed by: FAMILY MEDICINE

## 2019-03-07 PROCEDURE — 93018 CV STRESS TEST I&R ONLY: CPT | Performed by: FAMILY MEDICINE

## 2019-03-07 RX ORDER — 0.9 % SODIUM CHLORIDE 0.9 %
VIAL (ML) INJECTION
Status: COMPLETED
Start: 2019-03-07 | End: 2019-03-07

## 2019-03-07 RX ADMIN — 0.9 % SODIUM CHLORIDE 10 ML: 0.9 % VIAL (ML) INJECTION at 12:30:00

## 2019-03-08 ENCOUNTER — TELEPHONE (OUTPATIENT)
Dept: NEPHROLOGY | Facility: CLINIC | Age: 69
End: 2019-03-08

## 2019-03-11 ENCOUNTER — PATIENT OUTREACH (OUTPATIENT)
Dept: CASE MANAGEMENT | Age: 69
End: 2019-03-11

## 2019-03-11 NOTE — TELEPHONE ENCOUNTER
Lab test reviewed - BUN/Cr were 33/1.12 mg/dl in 2015 -> 27/0.99 mg/dl in 2016.  Calcium and albumin wnl    No other lab values in between 2016 - 2019

## 2019-03-11 NOTE — PROGRESS NOTES
Outreached to patient in regards to scheduling Medicare Annual exam (AHA). Patient scheduled her appt with her PCP for 03/18/2019. Thank you.

## 2019-03-12 ENCOUNTER — HOSPITAL ENCOUNTER (OUTPATIENT)
Dept: ULTRASOUND IMAGING | Age: 69
Discharge: HOME | End: 2019-03-12
Attending: FAMILY MEDICINE
Payer: MEDICARE

## 2019-03-12 DIAGNOSIS — N28.9 DECREASED RENAL FUNCTION: ICD-10-CM

## 2019-03-12 DIAGNOSIS — I10 ESSENTIAL HYPERTENSION: ICD-10-CM

## 2019-03-12 DIAGNOSIS — N18.30 STAGE 3 CHRONIC KIDNEY DISEASE (HCC): ICD-10-CM

## 2019-03-12 PROCEDURE — 76775 US EXAM ABDO BACK WALL LIM: CPT | Performed by: FAMILY MEDICINE

## 2019-03-15 ENCOUNTER — TELEPHONE (OUTPATIENT)
Dept: NEPHROLOGY | Facility: CLINIC | Age: 69
End: 2019-03-15

## 2019-03-15 NOTE — TELEPHONE ENCOUNTER
Can you put her one Wednesday march 20th at 10:40 am (have her come at 10:30 )    If not than Monday and Tuesday is fine- can double book her

## 2019-03-15 NOTE — TELEPHONE ENCOUNTER
Contacted pt to schedule appt with [facility]. Offered appt on Thursday 3/21 and Friday 3/22 but pt states she's leaving Thursday morning to go on a trip and won't be back until the end of the month.  Dr. Cedric Mcwilliams, would you like to offer her an appt sometime Mon-

## 2019-03-17 ENCOUNTER — MA CHART PREP (OUTPATIENT)
Dept: FAMILY MEDICINE CLINIC | Facility: CLINIC | Age: 69
End: 2019-03-17

## 2019-03-17 PROBLEM — Q61.3 POLYCYSTIC KIDNEY DISEASE: Status: ACTIVE | Noted: 2019-03-17

## 2019-03-18 ENCOUNTER — OFFICE VISIT (OUTPATIENT)
Dept: FAMILY MEDICINE CLINIC | Facility: CLINIC | Age: 69
End: 2019-03-18
Payer: MEDICARE

## 2019-03-18 VITALS
BODY MASS INDEX: 31.1 KG/M2 | WEIGHT: 169 LBS | HEIGHT: 62 IN | DIASTOLIC BLOOD PRESSURE: 75 MMHG | SYSTOLIC BLOOD PRESSURE: 143 MMHG | HEART RATE: 71 BPM | TEMPERATURE: 98 F

## 2019-03-18 DIAGNOSIS — I10 ESSENTIAL HYPERTENSION: Primary | ICD-10-CM

## 2019-03-18 PROCEDURE — G0463 HOSPITAL OUTPT CLINIC VISIT: HCPCS | Performed by: FAMILY MEDICINE

## 2019-03-18 PROCEDURE — 99213 OFFICE O/P EST LOW 20 MIN: CPT | Performed by: FAMILY MEDICINE

## 2019-03-18 NOTE — PROGRESS NOTES
HPI:    Patient ID: Luis Eduardo Meraz is a 79year old female. HPI  Patient presents with:  Blood Pressure: follow up     Metoprolol was increased to 100 mg daily. Patient has been taking 50 mg twice daily. Overall she feels well.   Blood pressures at home h to light. Neck: Normal range of motion. Neck supple. No thyromegaly present. Cardiovascular: Normal rate and regular rhythm. No murmur heard. Pulmonary/Chest: Effort normal and breath sounds normal. She has no wheezes.    Musculoskeletal: She exhibit

## 2019-03-20 ENCOUNTER — OFFICE VISIT (OUTPATIENT)
Dept: NEPHROLOGY | Facility: CLINIC | Age: 69
End: 2019-03-20
Payer: MEDICARE

## 2019-03-20 VITALS
SYSTOLIC BLOOD PRESSURE: 136 MMHG | WEIGHT: 170.19 LBS | HEIGHT: 62 IN | HEART RATE: 65 BPM | DIASTOLIC BLOOD PRESSURE: 65 MMHG | TEMPERATURE: 98 F | BODY MASS INDEX: 31.32 KG/M2

## 2019-03-20 DIAGNOSIS — N18.30 CKD (CHRONIC KIDNEY DISEASE), STAGE III (HCC): Primary | ICD-10-CM

## 2019-03-20 DIAGNOSIS — Q61.2 ADULT POLYCYSTIC KIDNEY DISEASE: ICD-10-CM

## 2019-03-20 PROCEDURE — G0463 HOSPITAL OUTPT CLINIC VISIT: HCPCS | Performed by: INTERNAL MEDICINE

## 2019-03-20 PROCEDURE — 99214 OFFICE O/P EST MOD 30 MIN: CPT | Performed by: INTERNAL MEDICINE

## 2019-03-20 NOTE — PATIENT INSTRUCTIONS
Bring home blood pressure readings on next visit   Follow up in 4 weeks   Drink 2-3 liters of water a day   Reduce hydrochlorothiazide to half tablet a day

## 2019-03-20 NOTE — PROGRESS NOTES
Progress Note:      Patient is a 79 yrs old female with pmh of CKD stage III, newly diagnosed polycystic kidney disease, HTN x 20 yrs, Hypothyroidism, Asthma who presented for follow up     Started following with Dr. Monico Beaulieu in 2018.  Recent Lab test showe tablet Rfl: 0   METOPROLOL SUCCINATE ER 50 MG Oral Tablet 24 Hr TAKE 1 TABLET BY MOUTH ONCE DAILY (Patient taking differently: TAKE 2 TABLETS BY MOUTH ONCE DAILY) Disp: 90 tablet Rfl: 0   Levothyroxine Sodium 50 MCG Oral Tab Take 1 tablet (50 mcg total) by non-distended, BS normal  Skin/Hair: no unusual rashes present  Back/Spine: no abnormalities noted  Musculoskeletal:  no deformities  Extremities: no edema  Neurological:  Grossly normal       ASSESSMENT/PLAN:     1. CKD stage III:   - renal US concern of

## 2019-04-01 DIAGNOSIS — I10 ESSENTIAL HYPERTENSION: ICD-10-CM

## 2019-04-01 RX ORDER — METOPROLOL SUCCINATE 50 MG/1
TABLET, EXTENDED RELEASE ORAL
Qty: 90 TABLET | Refills: 0 | Status: SHIPPED | OUTPATIENT
Start: 2019-04-01 | End: 2019-04-04

## 2019-04-02 DIAGNOSIS — I10 ESSENTIAL HYPERTENSION: ICD-10-CM

## 2019-04-02 RX ORDER — METOPROLOL SUCCINATE 50 MG/1
50 TABLET, EXTENDED RELEASE ORAL
Qty: 90 TABLET | Refills: 0 | Status: CANCELLED | OUTPATIENT
Start: 2019-04-02

## 2019-04-03 NOTE — TELEPHONE ENCOUNTER
Per med module, metoprolol rx approved yesterday for 3 month supply and sent to pharm. mychart response sent to pt at this time as well.

## 2019-04-04 ENCOUNTER — TELEPHONE (OUTPATIENT)
Dept: FAMILY MEDICINE CLINIC | Facility: CLINIC | Age: 69
End: 2019-04-04

## 2019-04-04 DIAGNOSIS — I10 ESSENTIAL HYPERTENSION: ICD-10-CM

## 2019-04-04 RX ORDER — METOPROLOL SUCCINATE 50 MG/1
TABLET, EXTENDED RELEASE ORAL
Qty: 180 TABLET | Refills: 0 | Status: SHIPPED | OUTPATIENT
Start: 2019-04-04 | End: 2019-07-03

## 2019-04-04 NOTE — TELEPHONE ENCOUNTER
Pharmacy calling to get correct script for pt's Metoprolol 50 mg. Should be take two tabs po q day. Will resend correct script to pharmacy.

## 2019-04-16 RX ORDER — HYDROCHLOROTHIAZIDE 25 MG/1
TABLET ORAL
Qty: 90 TABLET | Refills: 1 | Status: SHIPPED | OUTPATIENT
Start: 2019-04-16 | End: 2019-09-16 | Stop reason: ALTCHOICE

## 2019-04-18 RX ORDER — HYDROCHLOROTHIAZIDE 25 MG/1
TABLET ORAL
Qty: 90 TABLET | Refills: 0 | OUTPATIENT
Start: 2019-04-18

## 2019-04-22 ENCOUNTER — OFFICE VISIT (OUTPATIENT)
Dept: NEPHROLOGY | Facility: CLINIC | Age: 69
End: 2019-04-22
Payer: MEDICARE

## 2019-04-22 VITALS
SYSTOLIC BLOOD PRESSURE: 149 MMHG | HEIGHT: 62 IN | WEIGHT: 172.63 LBS | HEART RATE: 65 BPM | TEMPERATURE: 98 F | DIASTOLIC BLOOD PRESSURE: 63 MMHG | BODY MASS INDEX: 31.77 KG/M2

## 2019-04-22 DIAGNOSIS — N18.30 CKD (CHRONIC KIDNEY DISEASE), STAGE III (HCC): Primary | ICD-10-CM

## 2019-04-22 DIAGNOSIS — Q61.2 ADULT POLYCYSTIC KIDNEY DISEASE: ICD-10-CM

## 2019-04-22 PROCEDURE — 99214 OFFICE O/P EST MOD 30 MIN: CPT | Performed by: INTERNAL MEDICINE

## 2019-04-22 PROCEDURE — G0463 HOSPITAL OUTPT CLINIC VISIT: HCPCS | Performed by: INTERNAL MEDICINE

## 2019-04-22 NOTE — PROGRESS NOTES
Progress Note:      Patient is a 79 yrs old female with pmh of CKD stage III, newly diagnosed polycystic kidney disease, HTN x 20 yrs, Hypothyroidism, Asthma who presented for follow up     Started following with Dr. Lisandra Mitchell in 2018.  Recent Lab test showe ONCE DAILY IN THE MORNING (Patient taking differently: TAKE 1/2 TABLET BY MOUTH ONCE DAILY IN THE MORNING) Disp: 90 tablet Rfl: 1   Metoprolol Succinate ER 50 MG Oral Tablet 24 Hr Take two tablets by mouth daily Disp: 180 tablet Rfl: 0   Levothyroxine Sodi regular rate and rhythm  Abdomen: soft, non-tender, non-distended, BS normal  Skin/Hair: no unusual rashes present  Back/Spine: no abnormalities noted  Musculoskeletal:  no deformities  Extremities: no edema  Neurological:  Grossly normal       ASSESSMENT/

## 2019-04-22 NOTE — PROGRESS NOTES
Pt brought in home BP machine to check accuracy. First we checked manual BP and it was 172/76. The was higher than BP was initially when she came in so rechecked manual BP again and it was 170/80.  We then checked BP with her home machine and reading was 16

## 2019-05-01 ENCOUNTER — OFFICE VISIT (OUTPATIENT)
Dept: NEPHROLOGY | Facility: CLINIC | Age: 69
End: 2019-05-01
Payer: MEDICARE

## 2019-05-01 VITALS
WEIGHT: 170.81 LBS | DIASTOLIC BLOOD PRESSURE: 68 MMHG | HEART RATE: 71 BPM | SYSTOLIC BLOOD PRESSURE: 144 MMHG | HEIGHT: 62 IN | BODY MASS INDEX: 31.43 KG/M2 | TEMPERATURE: 97 F

## 2019-05-01 DIAGNOSIS — Q61.2 ADULT POLYCYSTIC KIDNEY DISEASE: ICD-10-CM

## 2019-05-01 DIAGNOSIS — N18.30 CKD (CHRONIC KIDNEY DISEASE), STAGE III (HCC): Primary | ICD-10-CM

## 2019-05-01 PROCEDURE — G0463 HOSPITAL OUTPT CLINIC VISIT: HCPCS | Performed by: INTERNAL MEDICINE

## 2019-05-01 PROCEDURE — 99214 OFFICE O/P EST MOD 30 MIN: CPT | Performed by: INTERNAL MEDICINE

## 2019-05-01 NOTE — PROGRESS NOTES
Progress Note:      Patient is a 79 yrs old female with pmh of CKD stage III, newly diagnosed polycystic kidney disease, HTN x 20 yrs, Hypothyroidism, Asthma who presented for follow up     Started following with Dr. Yaz Edmonds in 2018.  Recent Lab test showe Oral Tab TAKE 1 TABLET BY MOUTH ONCE DAILY IN THE MORNING (Patient taking differently: TAKE 1/2 TABLET BY MOUTH ONCE DAILY IN THE MORNING) Disp: 90 tablet Rfl: 1   Metoprolol Succinate ER 50 MG Oral Tablet 24 Hr Take two tablets by mouth daily Disp: 180 ta bilaterally  Cardiovascular: regular rate and rhythm  Abdomen: soft, non-tender, non-distended, BS normal  Skin/Hair: no unusual rashes present  Back/Spine: no abnormalities noted  Musculoskeletal:  no deformities  Extremities: no edema  Neurological:  [place]

## 2019-05-13 ENCOUNTER — TELEPHONE (OUTPATIENT)
Dept: NEPHROLOGY | Facility: CLINIC | Age: 69
End: 2019-05-13

## 2019-05-13 DIAGNOSIS — N18.30 CKD (CHRONIC KIDNEY DISEASE), STAGE III (HCC): Primary | ICD-10-CM

## 2019-05-13 DIAGNOSIS — Q61.2 ADULT POLYCYSTIC KIDNEY DISEASE: ICD-10-CM

## 2019-05-13 NOTE — TELEPHONE ENCOUNTER
Pt was in office today to sign paperwork to start [address]. Pt advised to do labs 2 weeks after starting Jynarque at [address] and then see [facility] 2-3 days after labs are done. Pt stated understanding.  Pt advised to call office when she receives medication so we ca

## 2019-05-13 NOTE — TELEPHONE ENCOUNTER
Patient to get CMP done 2 weeks post start on jynarque and follow up with me in 2 weeks after the start of medication

## 2019-05-14 NOTE — TELEPHONE ENCOUNTER
Received fax from [employer] that fax was received and they are working on the request. They will follow up with our office regarding next steps.

## 2019-05-16 NOTE — TELEPHONE ENCOUNTER
Received fax from [employer] that PA is required. They will initiate PA. Clinical info submitted to [employer] so they can begin PA process.

## 2019-05-28 NOTE — TELEPHONE ENCOUNTER
Spoke to pt. She received call from [facility] that medication is over $1000 and they will need hers & her 's financial info. Contacted [facility]. Spoke to Sravani.  Notified her that we did receive a fax from [facility] (dated 5/23/19), marilee

## 2019-05-31 NOTE — TELEPHONE ENCOUNTER
David Sales requesting to speak with RN - states pt was referred to  to be qualified for program to receive rx for free. [facility] pt will have to fill out her part of the paperwork and [facility]'s office will need to provide information.   Pls call Richard Montanez

## 2019-05-31 NOTE — TELEPHONE ENCOUNTER
Spoke to rep at [facility] (patient assistance program) to see how patient/we would initiate this program for her. Healthcare provider creates an account online and then can fill out with patient or patient can do online.  Pt will need to provide proof of househo

## 2019-06-06 NOTE — TELEPHONE ENCOUNTER
Spoke to pt and advised her to bring income documents and medicare expense report from pharmacy to her appt on Monday so we can start her patient assistance application.

## 2019-06-17 ENCOUNTER — TELEPHONE (OUTPATIENT)
Dept: FAMILY MEDICINE CLINIC | Facility: CLINIC | Age: 69
End: 2019-06-17

## 2019-06-17 ENCOUNTER — OFFICE VISIT (OUTPATIENT)
Dept: FAMILY MEDICINE CLINIC | Facility: CLINIC | Age: 69
End: 2019-06-17
Payer: MEDICARE

## 2019-06-17 VITALS
HEART RATE: 71 BPM | WEIGHT: 173 LBS | TEMPERATURE: 98 F | SYSTOLIC BLOOD PRESSURE: 146 MMHG | BODY MASS INDEX: 31.83 KG/M2 | HEIGHT: 62 IN | DIASTOLIC BLOOD PRESSURE: 74 MMHG

## 2019-06-17 DIAGNOSIS — J45.20 MILD INTERMITTENT REACTIVE AIRWAY DISEASE WITHOUT COMPLICATION: ICD-10-CM

## 2019-06-17 DIAGNOSIS — Q61.3 POLYCYSTIC KIDNEY DISEASE: ICD-10-CM

## 2019-06-17 DIAGNOSIS — E66.9 OBESITY (BMI 30.0-34.9): ICD-10-CM

## 2019-06-17 DIAGNOSIS — E03.9 HYPOTHYROIDISM, UNSPECIFIED TYPE: ICD-10-CM

## 2019-06-17 DIAGNOSIS — I10 ESSENTIAL HYPERTENSION: Primary | ICD-10-CM

## 2019-06-17 DIAGNOSIS — N18.30 STAGE 3 CHRONIC KIDNEY DISEASE (HCC): ICD-10-CM

## 2019-06-17 PROBLEM — J45.909 REACTIVE AIRWAY DISEASE (HCC): Status: ACTIVE | Noted: 2019-06-17

## 2019-06-17 PROBLEM — J45.909 REACTIVE AIRWAY DISEASE: Status: ACTIVE | Noted: 2019-06-17

## 2019-06-17 PROCEDURE — G0463 HOSPITAL OUTPT CLINIC VISIT: HCPCS | Performed by: FAMILY MEDICINE

## 2019-06-17 PROCEDURE — 99214 OFFICE O/P EST MOD 30 MIN: CPT | Performed by: FAMILY MEDICINE

## 2019-06-17 RX ORDER — ALBUTEROL SULFATE 90 UG/1
2 AEROSOL, METERED RESPIRATORY (INHALATION) EVERY 6 HOURS PRN
Qty: 1 INHALER | Refills: 3 | Status: SHIPPED | OUTPATIENT
Start: 2019-06-17 | End: 2019-09-16

## 2019-06-17 NOTE — PROGRESS NOTES
HPI:    Patient ID: Gloria Shirley is a 79year old female. HPI  Patient presents with:   Follow - Up: on BP and hypothyroidism   Medication Request    Overall doing well  Just diagnosed with polycystic kidney disease  Renal function followed by nephrology thyromegaly present. Cardiovascular: Normal rate and regular rhythm. No murmur heard. Pulmonary/Chest: Effort normal and breath sounds normal. She has no wheezes. Musculoskeletal: She exhibits no edema.    Neurological: She is alert and oriented to p

## 2019-06-17 NOTE — TELEPHONE ENCOUNTER
PA is needed. .. Eddye Cozier Eddye Cozier Eddye Cozier     ALBUTEROL HFA 90MCG AER  QTY 7  INHALE 2 PUFFS BY MOUTH EVERY 6 HOURS AS NEEDED FOR WHEEZING

## 2019-06-18 ENCOUNTER — OFFICE VISIT (OUTPATIENT)
Dept: NEPHROLOGY | Facility: CLINIC | Age: 69
End: 2019-06-18
Payer: MEDICARE

## 2019-06-18 VITALS
DIASTOLIC BLOOD PRESSURE: 83 MMHG | HEIGHT: 62 IN | TEMPERATURE: 99 F | BODY MASS INDEX: 32.39 KG/M2 | SYSTOLIC BLOOD PRESSURE: 156 MMHG | WEIGHT: 176 LBS | HEART RATE: 71 BPM

## 2019-06-18 DIAGNOSIS — Q61.2 ADULT POLYCYSTIC KIDNEY DISEASE: ICD-10-CM

## 2019-06-18 DIAGNOSIS — N18.30 CKD (CHRONIC KIDNEY DISEASE), STAGE III (HCC): Primary | ICD-10-CM

## 2019-06-18 PROCEDURE — 99214 OFFICE O/P EST MOD 30 MIN: CPT | Performed by: INTERNAL MEDICINE

## 2019-06-18 PROCEDURE — G0463 HOSPITAL OUTPT CLINIC VISIT: HCPCS | Performed by: INTERNAL MEDICINE

## 2019-06-18 NOTE — TELEPHONE ENCOUNTER
Spoke with Saige Cheatham to clarify PA request for Albuterol HFA. Per pharmacist Tracy is going through patient's insurance no PA is required as patient's plan prefers brand.

## 2019-06-18 NOTE — PROGRESS NOTES
Progress Note:      Patient is a 79 yrs old female with pmh of CKD stage III, newly diagnosed polycystic kidney disease, HTN x 20 yrs, Hypothyroidism, Asthma who presented for follow up     Started following with Dr. Jacob Schuler in 2018.  Recent Lab test showe Soln Inhale 2 puffs into the lungs every 6 (six) hours as needed for Wheezing.  Disp: 1 Inhaler Rfl: 3   HYDROCHLOROTHIAZIDE 25 MG Oral Tab TAKE 1 TABLET BY MOUTH ONCE DAILY IN THE MORNING (Patient taking differently: TAKE 1/2 TABLET BY MOUTH ONCE DAILY IN rate and rhythm  Abdomen: soft, non-tender, non-distended, BS normal  Skin/Hair: no unusual rashes present  Back/Spine: no abnormalities noted  Musculoskeletal:  no deformities  Extremities: no edema  Neurological:  Grossly normal       ASSESSMENT/PLAN:

## 2019-06-19 ENCOUNTER — TELEPHONE (OUTPATIENT)
Dept: NEPHROLOGY | Facility: CLINIC | Age: 69
End: 2019-06-19

## 2019-06-19 DIAGNOSIS — I10 ESSENTIAL HYPERTENSION: ICD-10-CM

## 2019-06-19 RX ORDER — LISINOPRIL 40 MG/1
TABLET ORAL
Qty: 90 TABLET | Refills: 1 | Status: SHIPPED | OUTPATIENT
Start: 2019-06-19 | End: 2019-09-16

## 2019-06-20 NOTE — TELEPHONE ENCOUNTER
Refill passed per [facility] protocol.   Hypertensive Medications  Protocol Criteria:  · Appointment scheduled in the past 6 months or in the next 3 months  · BMP or CMP in the past 12 months  · Creatinine result < 2  Recent Outpatient Visits

## 2019-07-03 DIAGNOSIS — I10 ESSENTIAL HYPERTENSION: ICD-10-CM

## 2019-07-03 RX ORDER — METOPROLOL SUCCINATE 50 MG/1
TABLET, EXTENDED RELEASE ORAL
Qty: 180 TABLET | Refills: 1 | Status: SHIPPED | OUTPATIENT
Start: 2019-07-03 | End: 2019-09-16 | Stop reason: ALTCHOICE

## 2019-07-10 NOTE — TELEPHONE ENCOUNTER
[facility] patient assistance program application still says 'missing information.' Called [facility] and spoke to [address] [place] [address].  She states she sees that the patient submitted the medication expense report so she will notate the application and have someone review the applic

## 2019-07-31 ENCOUNTER — TELEPHONE (OUTPATIENT)
Dept: NEPHROLOGY | Facility: CLINIC | Age: 69
End: 2019-07-31

## 2019-07-31 NOTE — TELEPHONE ENCOUNTER
See 6/19/19 TE. Per rina patient assistance for Carmela Linnette was denied. This information was routed to Dr. Daniela Gibbons by [facility].

## 2019-08-15 ENCOUNTER — TELEPHONE (OUTPATIENT)
Dept: NEPHROLOGY | Facility: CLINIC | Age: 69
End: 2019-08-15

## 2019-08-15 NOTE — TELEPHONE ENCOUNTER
Received following staff message:    Cindy Yoo RN  P  Nephro Clinical Staff   Cc: John Hooper MD             3 month [employer]S form for Jynarque needs to be completed by [employer] and faxed on 8/18/19 if Caren Perez is still taking Jynarque at this time.

## 2019-08-15 NOTE — TELEPHONE ENCOUNTER
Dr. Aurora Wallace. Patient called. She states she has not received medication Jynarque. Per chart review it seems Medicare insurance denied coverage for this medication.      Per TE from 6/19/19 Dipti Balderrama tried to get patient signed up for the patient assistance p

## 2019-09-16 ENCOUNTER — TELEPHONE (OUTPATIENT)
Dept: NEPHROLOGY | Facility: CLINIC | Age: 69
End: 2019-09-16

## 2019-09-16 ENCOUNTER — APPOINTMENT (OUTPATIENT)
Dept: LAB | Age: 69
End: 2019-09-16
Attending: FAMILY MEDICINE
Payer: MEDICARE

## 2019-09-16 ENCOUNTER — OFFICE VISIT (OUTPATIENT)
Dept: FAMILY MEDICINE CLINIC | Facility: CLINIC | Age: 69
End: 2019-09-16
Payer: MEDICARE

## 2019-09-16 VITALS
HEART RATE: 66 BPM | WEIGHT: 180 LBS | DIASTOLIC BLOOD PRESSURE: 76 MMHG | HEIGHT: 62 IN | BODY MASS INDEX: 33.13 KG/M2 | SYSTOLIC BLOOD PRESSURE: 138 MMHG | TEMPERATURE: 99 F

## 2019-09-16 DIAGNOSIS — I10 ESSENTIAL HYPERTENSION: ICD-10-CM

## 2019-09-16 DIAGNOSIS — Z23 NEED FOR INFLUENZA VACCINATION: ICD-10-CM

## 2019-09-16 DIAGNOSIS — Q61.3 POLYCYSTIC KIDNEY DISEASE: ICD-10-CM

## 2019-09-16 DIAGNOSIS — N18.30 STAGE 3 CHRONIC KIDNEY DISEASE (HCC): ICD-10-CM

## 2019-09-16 DIAGNOSIS — I10 ESSENTIAL HYPERTENSION: Primary | ICD-10-CM

## 2019-09-16 DIAGNOSIS — Z23 NEED FOR VACCINATION: ICD-10-CM

## 2019-09-16 DIAGNOSIS — E03.9 HYPOTHYROIDISM, UNSPECIFIED TYPE: ICD-10-CM

## 2019-09-16 DIAGNOSIS — E87.5 HYPERKALEMIA: Primary | ICD-10-CM

## 2019-09-16 DIAGNOSIS — N18.30 CKD (CHRONIC KIDNEY DISEASE), STAGE III (HCC): Primary | ICD-10-CM

## 2019-09-16 LAB
ALBUMIN SERPL-MCNC: 3.7 G/DL (ref 3.4–5)
ALBUMIN/GLOB SERPL: 1 {RATIO} (ref 1–2)
ALP LIVER SERPL-CCNC: 91 U/L (ref 55–142)
ALT SERPL-CCNC: 14 U/L (ref 13–56)
ANION GAP SERPL CALC-SCNC: 5 MMOL/L (ref 0–18)
AST SERPL-CCNC: 15 U/L (ref 15–37)
BILIRUB SERPL-MCNC: 0.3 MG/DL (ref 0.1–2)
BUN BLD-MCNC: 34 MG/DL (ref 7–18)
BUN/CREAT SERPL: 20.6 (ref 10–20)
CALCIUM BLD-MCNC: 9.8 MG/DL (ref 8.5–10.1)
CHLORIDE SERPL-SCNC: 105 MMOL/L (ref 98–112)
CO2 SERPL-SCNC: 29 MMOL/L (ref 21–32)
CREAT BLD-MCNC: 1.65 MG/DL (ref 0.55–1.02)
GLOBULIN PLAS-MCNC: 3.8 G/DL (ref 2.8–4.4)
GLUCOSE BLD-MCNC: 98 MG/DL (ref 70–99)
M PROTEIN MFR SERPL ELPH: 7.5 G/DL (ref 6.4–8.2)
OSMOLALITY SERPL CALC.SUM OF ELEC: 296 MOSM/KG (ref 275–295)
PATIENT FASTING: NO
POTASSIUM SERPL-SCNC: 5.4 MMOL/L (ref 3.5–5.1)
SODIUM SERPL-SCNC: 139 MMOL/L (ref 136–145)

## 2019-09-16 PROCEDURE — 80053 COMPREHEN METABOLIC PANEL: CPT

## 2019-09-16 PROCEDURE — G0008 ADMIN INFLUENZA VIRUS VAC: HCPCS | Performed by: FAMILY MEDICINE

## 2019-09-16 PROCEDURE — 99214 OFFICE O/P EST MOD 30 MIN: CPT | Performed by: FAMILY MEDICINE

## 2019-09-16 PROCEDURE — 90662 IIV NO PRSV INCREASED AG IM: CPT | Performed by: FAMILY MEDICINE

## 2019-09-16 PROCEDURE — 36415 COLL VENOUS BLD VENIPUNCTURE: CPT

## 2019-09-16 RX ORDER — LEVOTHYROXINE SODIUM 0.05 MG/1
50 TABLET ORAL
Qty: 90 TABLET | Refills: 3 | Status: SHIPPED | OUTPATIENT
Start: 2019-09-16 | End: 2020-06-24

## 2019-09-16 RX ORDER — HYDROCHLOROTHIAZIDE 12.5 MG/1
12.5 TABLET ORAL DAILY
Qty: 90 TABLET | Refills: 3 | Status: SHIPPED | OUTPATIENT
Start: 2019-09-16 | End: 2019-12-09

## 2019-09-16 RX ORDER — ALBUTEROL SULFATE 90 UG/1
2 AEROSOL, METERED RESPIRATORY (INHALATION) EVERY 6 HOURS PRN
Qty: 1 INHALER | Refills: 3 | Status: SHIPPED | OUTPATIENT
Start: 2019-09-16

## 2019-09-16 RX ORDER — METOPROLOL SUCCINATE 100 MG/1
100 TABLET, EXTENDED RELEASE ORAL DAILY
Qty: 90 TABLET | Refills: 3 | Status: SHIPPED | OUTPATIENT
Start: 2019-09-16 | End: 2020-06-24

## 2019-09-16 RX ORDER — LISINOPRIL 40 MG/1
40 TABLET ORAL DAILY
Qty: 90 TABLET | Refills: 3 | Status: SHIPPED | OUTPATIENT
Start: 2019-09-16 | End: 2020-06-24

## 2019-09-16 NOTE — PROGRESS NOTES
HPI:    Patient ID: Abelardo Avilez is a 79year old female.     HPI  Patient presents with:  Blood Pressure: follow up   Medication Request: pt states she is going to go to [place]       Sister passed away at age 76 from Dementia,  She has to fly there to Neck: Normal range of motion. Neck supple. No thyromegaly present. Cardiovascular: Normal rate and regular rhythm. No murmur heard. Pulmonary/Chest: Effort normal and breath sounds normal. She has no wheezes.    Musculoskeletal: She exhibits no edema

## 2019-09-17 ENCOUNTER — TELEPHONE (OUTPATIENT)
Dept: INTERNAL MEDICINE CLINIC | Facility: CLINIC | Age: 69
End: 2019-09-17

## 2019-09-17 NOTE — TELEPHONE ENCOUNTER
Instruct patient on low potassium diet - keep her self hydrated     Repeat blood test in 2 weeks - ordered

## 2019-09-18 NOTE — TELEPHONE ENCOUNTER
Contacted the [address] spoke with pharmacist Rnee informed no PA is required plan covers brand Ventolin they no longer cover the generic.  Pharmacist states they will order the medication tonight and will call the patient when the medication is read

## 2019-09-19 ENCOUNTER — APPOINTMENT (OUTPATIENT)
Dept: LAB | Age: 69
End: 2019-09-19
Attending: INTERNAL MEDICINE
Payer: MEDICARE

## 2019-09-19 DIAGNOSIS — N18.30 CKD (CHRONIC KIDNEY DISEASE), STAGE III (HCC): ICD-10-CM

## 2019-09-19 DIAGNOSIS — E87.5 HYPERKALEMIA: ICD-10-CM

## 2019-09-19 LAB
ANION GAP SERPL CALC-SCNC: 7 MMOL/L (ref 0–18)
BUN BLD-MCNC: 35 MG/DL (ref 7–18)
BUN/CREAT SERPL: 20.7 (ref 10–20)
CALCIUM BLD-MCNC: 9.7 MG/DL (ref 8.5–10.1)
CHLORIDE SERPL-SCNC: 106 MMOL/L (ref 98–112)
CO2 SERPL-SCNC: 26 MMOL/L (ref 21–32)
CREAT BLD-MCNC: 1.69 MG/DL (ref 0.55–1.02)
GLUCOSE BLD-MCNC: 108 MG/DL (ref 70–99)
OSMOLALITY SERPL CALC.SUM OF ELEC: 297 MOSM/KG (ref 275–295)
PATIENT FASTING: YES
POTASSIUM SERPL-SCNC: 5 MMOL/L (ref 3.5–5.1)
SODIUM SERPL-SCNC: 139 MMOL/L (ref 136–145)

## 2019-09-19 PROCEDURE — 36415 COLL VENOUS BLD VENIPUNCTURE: CPT

## 2019-09-19 PROCEDURE — 80048 BASIC METABOLIC PNL TOTAL CA: CPT

## 2019-09-20 NOTE — TELEPHONE ENCOUNTER
Patient contacted. Results message from Dr. Imer Vela read. She is aware to follow a low potassium diet (handout mailed) and patient is aware to do lab work in 2 weeks.  Patient states she has an appointment scheduled with Dr. Imer Vela on 10/1/19

## 2019-09-24 ENCOUNTER — TELEPHONE (OUTPATIENT)
Dept: NEPHROLOGY | Facility: CLINIC | Age: 69
End: 2019-09-24

## 2019-09-27 ENCOUNTER — OFFICE VISIT (OUTPATIENT)
Dept: NEPHROLOGY | Facility: CLINIC | Age: 69
End: 2019-09-27
Payer: MEDICARE

## 2019-09-27 VITALS
DIASTOLIC BLOOD PRESSURE: 74 MMHG | WEIGHT: 178.38 LBS | HEIGHT: 62 IN | TEMPERATURE: 97 F | SYSTOLIC BLOOD PRESSURE: 152 MMHG | HEART RATE: 61 BPM | BODY MASS INDEX: 32.82 KG/M2

## 2019-09-27 DIAGNOSIS — Q61.2 ADULT POLYCYSTIC KIDNEY DISEASE: ICD-10-CM

## 2019-09-27 DIAGNOSIS — N18.30 CKD (CHRONIC KIDNEY DISEASE), STAGE III (HCC): Primary | ICD-10-CM

## 2019-09-27 PROCEDURE — 99214 OFFICE O/P EST MOD 30 MIN: CPT | Performed by: INTERNAL MEDICINE

## 2019-09-27 NOTE — PATIENT INSTRUCTIONS
Stop hydrochlorothiazide   Follow up in 3 months   Lab test prior to next visit   Continue low potassium diet

## 2019-09-27 NOTE — PROGRESS NOTES
Progress Note:      Patient is a 79 yrs old female with pmh of CKD stage III, newly diagnosed polycystic kidney disease, HTN x 20 yrs, Hypothyroidism, Asthma who presented for follow up     Started following with Dr. Jeanne Moctezuma in 2018.  Recent Lab test showe Medications:  hydrochlorothiazide 12.5 MG Oral Tab Take 1 tablet (12.5 mg total) by mouth daily. Disp: 90 tablet Rfl: 3   lisinopril 40 MG Oral Tab Take 1 tablet (40 mg total) by mouth daily.  Disp: 90 tablet Rfl: 3   Metoprolol Succinate  MG Oral Tab bilaterally  Cardiovascular: regular rate and rhythm  Abdomen: soft, non-tender, non-distended, BS normal  Skin/Hair: no unusual rashes present  Back/Spine: no abnormalities noted  Musculoskeletal:  no deformities  Extremities: no edema  Neurological:  [place]

## 2019-09-28 ENCOUNTER — APPOINTMENT (OUTPATIENT)
Dept: LAB | Age: 69
End: 2019-09-28
Attending: INTERNAL MEDICINE
Payer: MEDICARE

## 2019-09-28 DIAGNOSIS — N18.30 CKD (CHRONIC KIDNEY DISEASE), STAGE III (HCC): ICD-10-CM

## 2019-09-28 DIAGNOSIS — Q61.2 ADULT POLYCYSTIC KIDNEY DISEASE: ICD-10-CM

## 2019-09-28 PROCEDURE — 36415 COLL VENOUS BLD VENIPUNCTURE: CPT

## 2019-09-28 PROCEDURE — 80069 RENAL FUNCTION PANEL: CPT

## 2019-09-28 PROCEDURE — 83970 ASSAY OF PARATHORMONE: CPT

## 2019-09-30 ENCOUNTER — TELEPHONE (OUTPATIENT)
Dept: NEPHROLOGY | Facility: CLINIC | Age: 69
End: 2019-09-30

## 2019-09-30 NOTE — TELEPHONE ENCOUNTER
Patient has questions regarding what medication she is suppose to stop taking. Please call. Thank you.

## 2019-09-30 NOTE — TELEPHONE ENCOUNTER
Pt notified with understanding. She stopped the lisinopril instead. Will restart this and stop the HCTZ. Verbalized understanding and denied further questions. TERRY HERNANDEZ.

## 2019-10-11 ENCOUNTER — APPOINTMENT (OUTPATIENT)
Dept: LAB | Age: 69
End: 2019-10-11
Attending: INTERNAL MEDICINE
Payer: MEDICARE

## 2019-10-11 DIAGNOSIS — N17.9 AKI (ACUTE KIDNEY INJURY) (HCC): ICD-10-CM

## 2019-10-11 DIAGNOSIS — Q61.2 ADULT POLYCYSTIC KIDNEY DISEASE: ICD-10-CM

## 2019-10-11 PROCEDURE — 80053 COMPREHEN METABOLIC PANEL: CPT | Performed by: INTERNAL MEDICINE

## 2019-10-11 PROCEDURE — 82570 ASSAY OF URINE CREATININE: CPT

## 2019-10-11 PROCEDURE — 36415 COLL VENOUS BLD VENIPUNCTURE: CPT | Performed by: INTERNAL MEDICINE

## 2019-10-11 PROCEDURE — 81003 URINALYSIS AUTO W/O SCOPE: CPT

## 2019-10-11 PROCEDURE — 82043 UR ALBUMIN QUANTITATIVE: CPT

## 2019-10-11 PROCEDURE — 84156 ASSAY OF PROTEIN URINE: CPT

## 2019-12-05 ENCOUNTER — TELEPHONE (OUTPATIENT)
Dept: CASE MANAGEMENT | Age: 69
End: 2019-12-05

## 2019-12-05 NOTE — TELEPHONE ENCOUNTER
Pt is eligible to schedule 2020 Medicare Annual Health Assessment visit. Left message to call back [phone].

## 2019-12-09 ENCOUNTER — OFFICE VISIT (OUTPATIENT)
Dept: FAMILY MEDICINE CLINIC | Facility: CLINIC | Age: 69
End: 2019-12-09
Payer: MEDICARE

## 2019-12-09 ENCOUNTER — APPOINTMENT (OUTPATIENT)
Dept: LAB | Age: 69
End: 2019-12-09
Attending: FAMILY MEDICINE
Payer: MEDICARE

## 2019-12-09 VITALS
BODY MASS INDEX: 32.94 KG/M2 | HEART RATE: 80 BPM | SYSTOLIC BLOOD PRESSURE: 174 MMHG | DIASTOLIC BLOOD PRESSURE: 79 MMHG | WEIGHT: 179 LBS | TEMPERATURE: 98 F | HEIGHT: 62 IN

## 2019-12-09 DIAGNOSIS — Q61.3 POLYCYSTIC KIDNEY DISEASE: ICD-10-CM

## 2019-12-09 DIAGNOSIS — N18.30 STAGE 3 CHRONIC KIDNEY DISEASE (HCC): ICD-10-CM

## 2019-12-09 DIAGNOSIS — I10 ESSENTIAL HYPERTENSION: Primary | ICD-10-CM

## 2019-12-09 DIAGNOSIS — E66.9 OBESITY (BMI 30.0-34.9): ICD-10-CM

## 2019-12-09 DIAGNOSIS — E03.9 HYPOTHYROIDISM, UNSPECIFIED TYPE: ICD-10-CM

## 2019-12-09 DIAGNOSIS — I10 ESSENTIAL HYPERTENSION: ICD-10-CM

## 2019-12-09 PROCEDURE — 80061 LIPID PANEL: CPT

## 2019-12-09 PROCEDURE — 84443 ASSAY THYROID STIM HORMONE: CPT

## 2019-12-09 PROCEDURE — 36415 COLL VENOUS BLD VENIPUNCTURE: CPT

## 2019-12-09 PROCEDURE — 80069 RENAL FUNCTION PANEL: CPT

## 2019-12-09 PROCEDURE — 99214 OFFICE O/P EST MOD 30 MIN: CPT | Performed by: FAMILY MEDICINE

## 2019-12-09 NOTE — PROGRESS NOTES
HPI:    Patient ID: Trav Weiss is a 81year old female. HPI  Patient presents with:  Blood Pressure: follow up and also follow up on hypothryroidism     Patient overall feels well. Denies any trouble urinating. Drinks a lot of water.  (24 oz x 3 a day normal and breath sounds normal. She has no wheezes. Musculoskeletal:         General: No edema. Neurological: She is alert and oriented to person, place, and time. Skin: Skin is warm and dry. No rash noted.               ASSESSMENT/PLAN:   Essential

## 2020-01-21 ENCOUNTER — OFFICE VISIT (OUTPATIENT)
Dept: NEPHROLOGY | Facility: CLINIC | Age: 70
End: 2020-01-21
Payer: MEDICARE

## 2020-01-21 VITALS
HEART RATE: 73 BPM | SYSTOLIC BLOOD PRESSURE: 190 MMHG | WEIGHT: 180 LBS | TEMPERATURE: 98 F | DIASTOLIC BLOOD PRESSURE: 80 MMHG | BODY MASS INDEX: 33 KG/M2

## 2020-01-21 DIAGNOSIS — Q61.2 ADULT POLYCYSTIC KIDNEY DISEASE: ICD-10-CM

## 2020-01-21 DIAGNOSIS — N18.30 CKD (CHRONIC KIDNEY DISEASE), STAGE III (HCC): Primary | ICD-10-CM

## 2020-01-21 PROCEDURE — 99214 OFFICE O/P EST MOD 30 MIN: CPT | Performed by: INTERNAL MEDICINE

## 2020-01-21 RX ORDER — METOPROLOL SUCCINATE 50 MG/1
50 TABLET, EXTENDED RELEASE ORAL EVERY EVENING
Qty: 90 TABLET | Refills: 0 | Status: SHIPPED | OUTPATIENT
Start: 2020-01-21 | End: 2020-03-18

## 2020-01-21 NOTE — PROGRESS NOTES
Progress Note:      Patient is a 81 yrs old female with pmh of CKD stage III, polycystic kidney disease, HTN x 20 yrs, Hypothyroidism, Asthma who presented for follow up     Started following with Dr. Dalton Delvalle in 2018.  Recent Lab test showed BUN/Cr 40/1.4 Used    Alcohol use: No    Drug use: No       Medications (Active prior to today's visit):  Current Outpatient Medications   Medication Sig Dispense Refill   • Metoprolol Succinate ER 50 MG Oral Tablet 24 Hr Take 1 tablet (50 mg total) by mouth every eveni abnormal cervical, supraclavicular adenopathy is noted  Respiratory:  lungs are clear to auscultation bilaterally  Cardiovascular: regular rate and rhythm  Abdomen: soft, non-tender, non-distended, BS normal  Skin/Hair: no unusual rashes present  Back/Spin

## 2020-01-21 NOTE — PATIENT INSTRUCTIONS
Call in 2 weeks with home blood pressure readings   Start metoprolol 50 mg in evening   Follow up in 6 months   Lab test prior to next visit

## 2020-01-22 ENCOUNTER — NURSE TRIAGE (OUTPATIENT)
Dept: FAMILY MEDICINE CLINIC | Facility: CLINIC | Age: 70
End: 2020-01-22

## 2020-01-22 NOTE — TELEPHONE ENCOUNTER
----- Message from Thomas Leon sent at 1/22/2020 11:33 AM CST -----  Regarding: Referral Request  Contact: [phone]  Need referral to see therapist at [address] [place] [address]    [phone] thank you

## 2020-01-22 NOTE — TELEPHONE ENCOUNTER
Action Requested: Summary for Provider     []  Critical Lab, Recommendations Needed  [] Need Additional Advice  []   FYI    []   Need Orders  [] Need Medications Sent to Pharmacy  []  Other     SUMMARY: Advised pt be seen at [facility] to discuss feelings of depre

## 2020-01-22 NOTE — PROGRESS NOTES
HPI:    Patient ID: Trav Weiss is a 81year old female. HPI  Patient presents with:  Depression: started before August but made it worst after loosing her sister August 2019      Sister passed away from dementia, took care of her for 3 yrs.   She couldn and well-nourished. Eyes: Pupils are equal, round, and reactive to light. Neck: Normal range of motion. Neck supple. No thyromegaly present. Cardiovascular: Normal rate and regular rhythm. No murmur heard.   Pulmonary/Chest: Effort normal and breath

## 2020-02-13 ENCOUNTER — OFFICE VISIT (OUTPATIENT)
Dept: FAMILY MEDICINE CLINIC | Facility: CLINIC | Age: 70
End: 2020-02-13
Payer: MEDICARE

## 2020-02-13 VITALS
BODY MASS INDEX: 32.94 KG/M2 | DIASTOLIC BLOOD PRESSURE: 77 MMHG | SYSTOLIC BLOOD PRESSURE: 172 MMHG | HEART RATE: 77 BPM | HEIGHT: 62 IN | WEIGHT: 179 LBS | TEMPERATURE: 98 F

## 2020-02-13 DIAGNOSIS — Z01.419 ENCOUNTER FOR GYNECOLOGICAL EXAMINATION: ICD-10-CM

## 2020-02-13 DIAGNOSIS — Z00.00 ENCOUNTER FOR ANNUAL HEALTH EXAMINATION: Primary | ICD-10-CM

## 2020-02-13 DIAGNOSIS — E55.9 VITAMIN D DEFICIENCY: ICD-10-CM

## 2020-02-13 DIAGNOSIS — Q61.3 POLYCYSTIC KIDNEY DISEASE: ICD-10-CM

## 2020-02-13 DIAGNOSIS — E03.9 HYPOTHYROIDISM, UNSPECIFIED TYPE: ICD-10-CM

## 2020-02-13 DIAGNOSIS — E78.00 MILD HYPERCHOLESTEROLEMIA: ICD-10-CM

## 2020-02-13 DIAGNOSIS — E66.9 OBESITY (BMI 30.0-34.9): ICD-10-CM

## 2020-02-13 DIAGNOSIS — I83.813 VARICOSE VEINS OF BOTH LOWER EXTREMITIES WITH PAIN: ICD-10-CM

## 2020-02-13 DIAGNOSIS — I10 ESSENTIAL HYPERTENSION: ICD-10-CM

## 2020-02-13 DIAGNOSIS — J45.20 MILD INTERMITTENT REACTIVE AIRWAY DISEASE WITHOUT COMPLICATION: ICD-10-CM

## 2020-02-13 DIAGNOSIS — Z12.31 ENCOUNTER FOR SCREENING MAMMOGRAM FOR BREAST CANCER: ICD-10-CM

## 2020-02-13 DIAGNOSIS — N18.30 STAGE 3 CHRONIC KIDNEY DISEASE (HCC): ICD-10-CM

## 2020-02-13 PROCEDURE — 96160 PT-FOCUSED HLTH RISK ASSMT: CPT | Performed by: FAMILY MEDICINE

## 2020-02-13 PROCEDURE — 99397 PER PM REEVAL EST PAT 65+ YR: CPT | Performed by: FAMILY MEDICINE

## 2020-02-13 PROCEDURE — G0439 PPPS, SUBSEQ VISIT: HCPCS | Performed by: FAMILY MEDICINE

## 2020-02-13 NOTE — PROGRESS NOTES
HPI:   Cheo Vogel is a 81year old female who presents for a MA (Medicare Advantage) Supervisit (Once per calendar year). Annual Physical due on 02/25/2020    emotionally feels better  Going to [employer]  Working out, started gym.     Bps at home 140s/ Hypothyroidism     Obesity (BMI 30.0-34. 9)     Vitamin D deficiency     Stage 3 chronic kidney disease (HCC)     Varicose veins of both lower extremities with pain     Polycystic kidney disease     Reactive airway disease    Wt Readings from Last 3 Encou has never used smokeless tobacco. She reports that she does not drink alcohol or use drugs.      REVIEW OF SYSTEMS:      Negative except emotionally better     EXAM:   BP (!) 172/77   Pulse 77   Temp 97.8 °F (36.6 °C) (Oral)   Ht 5' 2\" (1.575 m)   Wt 179 l Tolerate Visual Acuity: Yes      General Appearance:  Alert, cooperative, no distress, appears stated age   Head:  Normocephalic, without obvious abnormality, atraumatic   Eyes:  PERRL, conjunctiva/corneas clear, EOM's intact both eyes   Ears:  Normal TM's (Prevnar 13) 02/25/2019   • Pneumovax 23 02/05/2018        ASSESSMENT AND OTHER RELEVANT CHRONIC CONDITIONS:   Cheryl Maurer is a 81year old female who presents for a Medicare Assessment.      PLAN SUMMARY:   Diagnoses and all orders for this visit:    Darien assessment: fair     PLAN:  The patient indicates understanding of these issues and agrees to the plan. Reinforced healthy diet, lifestyle, and exercise. Lab work ordered. Continue with current treatment plan. No follow-ups on file. Marcell Cardoza, 21-65 or Pap+HPV every 5 yrs age 33-67, age 72 and older at high risk There are no preventive care reminders to display for this patient.  Update Health Maintenance if applicable    Chlamydia  Annually if high risk No results found for: CHLAMYDIA No flowshe Template: DOYLE GASPAR MEDICARE ANNUAL ASSESSMENT FEMALE [26538]

## 2020-02-13 NOTE — PATIENT INSTRUCTIONS
Radha Wells's SCREENING SCHEDULE   Tests on this list are recommended by your physician but may not be covered, or covered at this frequency, by your insurer. Please check with your insurance carrier before scheduling to verify coverage.    PREVENTATIVE S more often if abnormal Colonoscopy due on 02/24/2024 Update [facility] if applicable    Flex Sigmoidoscopy Screen  Covered every 5 years No results found for this or any previous visit. No flowsheet data found.      Fecal Occult Blood   Covered Cheryl once after your 65th birthday    Pneumococcal 23 (Pneumovax)  Covered Once after 72 Orders placed or performed in visit on 02/05/18   • PNEUMOCOCCAL IMM (PNEUMOVAX)    Please get once after your 65th birthday    Hepatitis B for Moderate/High Risk       No

## 2020-02-20 PROBLEM — N28.9 DECREASED RENAL FUNCTION: Status: RESOLVED | Noted: 2018-02-19 | Resolved: 2020-02-20

## 2020-02-20 PROBLEM — D64.9 MILD ANEMIA: Status: RESOLVED | Noted: 2018-02-19 | Resolved: 2020-02-20

## 2020-02-20 PROBLEM — J45.909 REACTIVE AIRWAY DISEASE: Status: RESOLVED | Noted: 2019-06-17 | Resolved: 2020-02-20

## 2020-02-20 PROBLEM — J45.909 REACTIVE AIRWAY DISEASE (HCC): Status: RESOLVED | Noted: 2019-06-17 | Resolved: 2020-02-20

## 2020-03-04 ENCOUNTER — LAB ENCOUNTER (OUTPATIENT)
Dept: LAB | Age: 70
End: 2020-03-04
Attending: PHYSICIAN ASSISTANT
Payer: MEDICARE

## 2020-03-04 ENCOUNTER — APPOINTMENT (OUTPATIENT)
Dept: LAB | Age: 70
End: 2020-03-04
Attending: PHYSICIAN ASSISTANT
Payer: MEDICARE

## 2020-03-04 ENCOUNTER — NURSE TRIAGE (OUTPATIENT)
Dept: OTHER | Age: 70
End: 2020-03-04

## 2020-03-04 ENCOUNTER — OFFICE VISIT (OUTPATIENT)
Dept: FAMILY MEDICINE CLINIC | Facility: CLINIC | Age: 70
End: 2020-03-04
Payer: MEDICARE

## 2020-03-04 VITALS
BODY MASS INDEX: 32.57 KG/M2 | TEMPERATURE: 98 F | RESPIRATION RATE: 16 BRPM | HEIGHT: 62 IN | WEIGHT: 177 LBS | OXYGEN SATURATION: 100 %

## 2020-03-04 DIAGNOSIS — R42 DIZZINESS: Primary | ICD-10-CM

## 2020-03-04 DIAGNOSIS — E87.5 SERUM POTASSIUM ELEVATED: ICD-10-CM

## 2020-03-04 DIAGNOSIS — R42 DIZZINESS AND GIDDINESS: Primary | ICD-10-CM

## 2020-03-04 DIAGNOSIS — R42 DIZZINESS: ICD-10-CM

## 2020-03-04 LAB
ALBUMIN SERPL-MCNC: 3.9 G/DL (ref 3.4–5)
ALBUMIN/GLOB SERPL: 1 {RATIO} (ref 1–2)
ALP LIVER SERPL-CCNC: 94 U/L (ref 55–142)
ALT SERPL-CCNC: 15 U/L (ref 13–56)
ANION GAP SERPL CALC-SCNC: 7 MMOL/L (ref 0–18)
AST SERPL-CCNC: 10 U/L (ref 15–37)
BASOPHILS # BLD AUTO: 0.04 X10(3) UL (ref 0–0.2)
BASOPHILS NFR BLD AUTO: 0.5 %
BILIRUB SERPL-MCNC: 0.4 MG/DL (ref 0.1–2)
BUN BLD-MCNC: 28 MG/DL (ref 7–18)
BUN/CREAT SERPL: 17 (ref 10–20)
CALCIUM BLD-MCNC: 10.1 MG/DL (ref 8.5–10.1)
CHLORIDE SERPL-SCNC: 96 MMOL/L (ref 98–112)
CO2 SERPL-SCNC: 26 MMOL/L (ref 21–32)
CREAT BLD-MCNC: 1.65 MG/DL (ref 0.55–1.02)
DEPRECATED RDW RBC AUTO: 42.8 FL (ref 35.1–46.3)
EOSINOPHIL # BLD AUTO: 0.05 X10(3) UL (ref 0–0.7)
EOSINOPHIL NFR BLD AUTO: 0.7 %
ERYTHROCYTE [DISTWIDTH] IN BLOOD BY AUTOMATED COUNT: 13.6 % (ref 11–15)
GLOBULIN PLAS-MCNC: 3.8 G/DL (ref 2.8–4.4)
GLUCOSE BLD-MCNC: 109 MG/DL (ref 70–99)
HCT VFR BLD AUTO: 38.3 % (ref 35–48)
HGB BLD-MCNC: 12.5 G/DL (ref 12–16)
IMM GRANULOCYTES # BLD AUTO: 0.01 X10(3) UL (ref 0–1)
IMM GRANULOCYTES NFR BLD: 0.1 %
LYMPHOCYTES # BLD AUTO: 2.63 X10(3) UL (ref 1–4)
LYMPHOCYTES NFR BLD AUTO: 34.9 %
M PROTEIN MFR SERPL ELPH: 7.7 G/DL (ref 6.4–8.2)
MCH RBC QN AUTO: 28 PG (ref 26–34)
MCHC RBC AUTO-ENTMCNC: 32.6 G/DL (ref 31–37)
MCV RBC AUTO: 85.9 FL (ref 80–100)
MONOCYTES # BLD AUTO: 0.48 X10(3) UL (ref 0.1–1)
MONOCYTES NFR BLD AUTO: 6.4 %
NEUTROPHILS # BLD AUTO: 4.32 X10 (3) UL (ref 1.5–7.7)
NEUTROPHILS # BLD AUTO: 4.32 X10(3) UL (ref 1.5–7.7)
NEUTROPHILS NFR BLD AUTO: 57.4 %
OSMOLALITY SERPL CALC.SUM OF ELEC: 274 MOSM/KG (ref 275–295)
PATIENT FASTING Y/N/NP: NO
PLATELET # BLD AUTO: 184 10(3)UL (ref 150–450)
POTASSIUM SERPL-SCNC: 5.5 MMOL/L (ref 3.5–5.1)
RBC # BLD AUTO: 4.46 X10(6)UL (ref 3.8–5.3)
SODIUM SERPL-SCNC: 129 MMOL/L (ref 136–145)
WBC # BLD AUTO: 7.5 X10(3) UL (ref 4–11)

## 2020-03-04 PROCEDURE — 36415 COLL VENOUS BLD VENIPUNCTURE: CPT

## 2020-03-04 PROCEDURE — 99213 OFFICE O/P EST LOW 20 MIN: CPT | Performed by: PHYSICIAN ASSISTANT

## 2020-03-04 PROCEDURE — 85025 COMPLETE CBC W/AUTO DIFF WBC: CPT

## 2020-03-04 PROCEDURE — 80053 COMPREHEN METABOLIC PANEL: CPT

## 2020-03-04 PROCEDURE — 93010 ELECTROCARDIOGRAM REPORT: CPT | Performed by: PHYSICIAN ASSISTANT

## 2020-03-04 PROCEDURE — 93005 ELECTROCARDIOGRAM TRACING: CPT

## 2020-03-04 NOTE — TELEPHONE ENCOUNTER
Action Requested: Summary for Provider     []  Critical Lab, Recommendations Needed  [] Need Additional Advice  []   FYI    []   Need Orders  [] Need Medications Sent to Pharmacy  []  Other     SUMMARY: Patient states for one week she has been having dizzi

## 2020-03-04 NOTE — PROGRESS NOTES
HPI:    Patient ID: Sudeep Balderas is a 81year old female. Patient presents for dizziness for the past 3 days. Intermittently occurs. No nausea or vomiting noted. Feels lightheaded and dizzy. Feels like she needs to sit down when she gets lightheaded.  Kamlesh Lockhart Patient Position: Sitting, Cuff Size: large)   Pulse 61   Temp 97.8 °F (36.6 °C) (Oral)   Resp 16   Ht 5' 2\" (1.575 m)   Wt 177 lb (80.3 kg)   SpO2 100%   BMI 32.37 kg/m²   Body mass index is 32.37 kg/m².   PHYSICAL EXAM:   Physical Exam    Constitutional: Differential W Platelet [E]      Comp Metabolic Panel (14) [E]      Meds This Visit:  Requested Prescriptions      No prescriptions requested or ordered in this encounter       Imaging & Referrals:  EKG 12-LEAD         ID#[id number]

## 2020-03-07 ENCOUNTER — APPOINTMENT (OUTPATIENT)
Dept: LAB | Age: 70
End: 2020-03-07
Attending: FAMILY MEDICINE
Payer: MEDICARE

## 2020-03-07 DIAGNOSIS — E87.5 SERUM POTASSIUM ELEVATED: ICD-10-CM

## 2020-03-07 DIAGNOSIS — E78.00 MILD HYPERCHOLESTEROLEMIA: ICD-10-CM

## 2020-03-07 LAB
ANION GAP SERPL CALC-SCNC: 4 MMOL/L (ref 0–18)
BUN BLD-MCNC: 32 MG/DL (ref 7–18)
BUN/CREAT SERPL: 21.3 (ref 10–20)
CALCIUM BLD-MCNC: 9.6 MG/DL (ref 8.5–10.1)
CHLORIDE SERPL-SCNC: 97 MMOL/L (ref 98–112)
CHOLEST SMN-MCNC: 185 MG/DL (ref ?–200)
CO2 SERPL-SCNC: 27 MMOL/L (ref 21–32)
CREAT BLD-MCNC: 1.5 MG/DL (ref 0.55–1.02)
GLUCOSE BLD-MCNC: 96 MG/DL (ref 70–99)
HDLC SERPL-MCNC: 49 MG/DL (ref 40–59)
LDLC SERPL CALC-MCNC: 108 MG/DL (ref ?–100)
NONHDLC SERPL-MCNC: 136 MG/DL (ref ?–130)
OSMOLALITY SERPL CALC.SUM OF ELEC: 273 MOSM/KG (ref 275–295)
PATIENT FASTING Y/N/NP: YES
PATIENT FASTING Y/N/NP: YES
POTASSIUM SERPL-SCNC: 5.3 MMOL/L (ref 3.5–5.1)
SODIUM SERPL-SCNC: 128 MMOL/L (ref 136–145)
TRIGL SERPL-MCNC: 142 MG/DL (ref 30–149)
VLDLC SERPL CALC-MCNC: 28 MG/DL (ref 0–30)

## 2020-03-07 PROCEDURE — 80048 BASIC METABOLIC PNL TOTAL CA: CPT

## 2020-03-07 PROCEDURE — 36415 COLL VENOUS BLD VENIPUNCTURE: CPT

## 2020-03-07 PROCEDURE — 80061 LIPID PANEL: CPT

## 2020-03-09 ENCOUNTER — HOSPITAL ENCOUNTER (OUTPATIENT)
Dept: MAMMOGRAPHY | Age: 70
Discharge: HOME | End: 2020-03-09
Attending: FAMILY MEDICINE
Payer: MEDICARE

## 2020-03-09 DIAGNOSIS — Z12.31 ENCOUNTER FOR SCREENING MAMMOGRAM FOR BREAST CANCER: ICD-10-CM

## 2020-03-09 PROCEDURE — 77063 BREAST TOMOSYNTHESIS BI: CPT | Performed by: FAMILY MEDICINE

## 2020-03-09 PROCEDURE — 77067 SCR MAMMO BI INCL CAD: CPT | Performed by: FAMILY MEDICINE

## 2020-03-12 ENCOUNTER — OFFICE VISIT (OUTPATIENT)
Dept: NEPHROLOGY | Facility: CLINIC | Age: 70
End: 2020-03-12
Payer: MEDICARE

## 2020-03-12 VITALS
HEART RATE: 77 BPM | DIASTOLIC BLOOD PRESSURE: 69 MMHG | SYSTOLIC BLOOD PRESSURE: 167 MMHG | HEIGHT: 62 IN | WEIGHT: 176.81 LBS | TEMPERATURE: 98 F | BODY MASS INDEX: 32.54 KG/M2

## 2020-03-12 DIAGNOSIS — E87.5 HYPERKALEMIA: ICD-10-CM

## 2020-03-12 DIAGNOSIS — E87.1 HYPONATREMIA: ICD-10-CM

## 2020-03-12 DIAGNOSIS — N18.30 CKD (CHRONIC KIDNEY DISEASE), STAGE III (HCC): Primary | ICD-10-CM

## 2020-03-12 PROCEDURE — 99214 OFFICE O/P EST MOD 30 MIN: CPT | Performed by: INTERNAL MEDICINE

## 2020-03-12 NOTE — PROGRESS NOTES
Progress Note:      Patient is a 81 yrs old female with pmh of CKD stage III, polycystic kidney disease, HTN x 20 yrs, Hypothyroidism, Asthma who presented for follow up     Started following with Dr. Gibran Gregg in 2018.  Recent Lab test showed BUN/Cr 40/1.4 mg tobacco: Never Used    Alcohol use: No    Drug use: No       Medications (Active prior to today's visit):  Current Outpatient Medications   Medication Sig Dispense Refill   • lisinopril 40 MG Oral Tab Take 1 tablet (40 mg total) by mouth daily.  90 tablet 3 Neck/Thyroid: neck is supple without adenopathy  Lymphatic: no abnormal cervical, supraclavicular adenopathy is noted  Respiratory:  lungs are clear to auscultation bilaterally  Cardiovascular: regular rate and rhythm  Abdomen: soft, non-tender, non-dist

## 2020-03-12 NOTE — PATIENT INSTRUCTIONS
Reduce water intake to 60 ounces a day  Repeat lab test in one week  Low potassium diet - chart provided   Follow up in 6 months

## 2020-03-18 ENCOUNTER — OFFICE VISIT (OUTPATIENT)
Dept: FAMILY MEDICINE CLINIC | Facility: CLINIC | Age: 70
End: 2020-03-18
Payer: MEDICARE

## 2020-03-18 VITALS
DIASTOLIC BLOOD PRESSURE: 70 MMHG | WEIGHT: 176 LBS | BODY MASS INDEX: 32.39 KG/M2 | RESPIRATION RATE: 16 BRPM | HEIGHT: 62 IN | SYSTOLIC BLOOD PRESSURE: 126 MMHG | HEART RATE: 73 BPM | TEMPERATURE: 98 F

## 2020-03-18 DIAGNOSIS — I10 ESSENTIAL HYPERTENSION: Primary | ICD-10-CM

## 2020-03-18 PROCEDURE — 99213 OFFICE O/P EST LOW 20 MIN: CPT | Performed by: FAMILY MEDICINE

## 2020-03-18 NOTE — PROGRESS NOTES
HPI:    Patient ID: Kaylee Brito is a 81year old female. HPI  Patient presents with:  Dizziness: Patient present for follow up visit - cut medication down and feels better    Patient feels doing better. Stopped metoprolol 50 mg as was having dizziness. rhythm. No murmur heard. Pulmonary/Chest: Effort normal and breath sounds normal. She has no wheezes. Musculoskeletal:         General: No edema. Neurological: She is alert and oriented to person, place, and time. Skin: Skin is warm and dry.  No ra

## 2020-03-20 ENCOUNTER — APPOINTMENT (OUTPATIENT)
Dept: LAB | Age: 70
End: 2020-03-20
Attending: INTERNAL MEDICINE
Payer: MEDICARE

## 2020-03-20 DIAGNOSIS — N18.30 CKD (CHRONIC KIDNEY DISEASE), STAGE III (HCC): ICD-10-CM

## 2020-03-20 LAB
ANION GAP SERPL CALC-SCNC: 6 MMOL/L (ref 0–18)
BUN BLD-MCNC: 27 MG/DL (ref 7–18)
BUN/CREAT SERPL: 17.3 (ref 10–20)
CALCIUM BLD-MCNC: 9.9 MG/DL (ref 8.5–10.1)
CHLORIDE SERPL-SCNC: 104 MMOL/L (ref 98–112)
CO2 SERPL-SCNC: 26 MMOL/L (ref 21–32)
CREAT BLD-MCNC: 1.56 MG/DL (ref 0.55–1.02)
GLUCOSE BLD-MCNC: 95 MG/DL (ref 70–99)
OSMOLALITY SERPL CALC.SUM OF ELEC: 287 MOSM/KG (ref 275–295)
PATIENT FASTING Y/N/NP: YES
POTASSIUM SERPL-SCNC: 4.2 MMOL/L (ref 3.5–5.1)
SODIUM SERPL-SCNC: 136 MMOL/L (ref 136–145)

## 2020-03-20 PROCEDURE — 36415 COLL VENOUS BLD VENIPUNCTURE: CPT

## 2020-03-20 PROCEDURE — 80048 BASIC METABOLIC PNL TOTAL CA: CPT

## 2020-06-24 ENCOUNTER — OFFICE VISIT (OUTPATIENT)
Dept: FAMILY MEDICINE CLINIC | Facility: CLINIC | Age: 70
End: 2020-06-24
Payer: MEDICARE

## 2020-06-24 VITALS
HEIGHT: 62 IN | WEIGHT: 175 LBS | SYSTOLIC BLOOD PRESSURE: 130 MMHG | TEMPERATURE: 98 F | BODY MASS INDEX: 32.2 KG/M2 | DIASTOLIC BLOOD PRESSURE: 80 MMHG | HEART RATE: 66 BPM

## 2020-06-24 DIAGNOSIS — E66.9 OBESITY (BMI 30.0-34.9): ICD-10-CM

## 2020-06-24 DIAGNOSIS — I10 ESSENTIAL HYPERTENSION: Primary | ICD-10-CM

## 2020-06-24 DIAGNOSIS — E03.9 HYPOTHYROIDISM, UNSPECIFIED TYPE: ICD-10-CM

## 2020-06-24 PROCEDURE — 99214 OFFICE O/P EST MOD 30 MIN: CPT | Performed by: FAMILY MEDICINE

## 2020-06-24 RX ORDER — LISINOPRIL 40 MG/1
40 TABLET ORAL DAILY
Qty: 90 TABLET | Refills: 3 | Status: SHIPPED | OUTPATIENT
Start: 2020-06-24 | End: 2021-09-08

## 2020-06-24 RX ORDER — LEVOTHYROXINE SODIUM 0.05 MG/1
50 TABLET ORAL
Qty: 90 TABLET | Refills: 0 | Status: SHIPPED | OUTPATIENT
Start: 2020-06-24 | End: 2020-09-03

## 2020-06-24 RX ORDER — METOPROLOL SUCCINATE 100 MG/1
100 TABLET, EXTENDED RELEASE ORAL DAILY
Qty: 90 TABLET | Refills: 3 | Status: SHIPPED | OUTPATIENT
Start: 2020-06-24 | End: 2021-09-08

## 2020-06-24 NOTE — PROGRESS NOTES
HPI:    Patient ID: Kary Dooley is a 81year old female. HPI  Patient presents with:  Blood Pressure: follow up         Review of Systems   Constitutional: Negative for chills and fever. Eyes: Negative for visual disturbance.    Respiratory: Negative f ASSESSMENT/PLAN:   Essential hypertension  (primary encounter diagnosis)  Hypothyroidism, unspecified type  Obesity (bmi 30.0-34.9)    1. Essential hypertension  Well controlled  Continue present management   - RENAL FUNCTION PANEL; Future    2.  Hypothyr

## 2020-08-28 ENCOUNTER — LAB ENCOUNTER (OUTPATIENT)
Dept: LAB | Age: 70
End: 2020-08-28
Attending: INTERNAL MEDICINE
Payer: MEDICARE

## 2020-08-28 DIAGNOSIS — E03.9 HYPOTHYROIDISM, UNSPECIFIED TYPE: ICD-10-CM

## 2020-08-28 DIAGNOSIS — N18.30 CKD (CHRONIC KIDNEY DISEASE), STAGE III (HCC): ICD-10-CM

## 2020-08-28 DIAGNOSIS — Q61.2 ADULT POLYCYSTIC KIDNEY DISEASE: ICD-10-CM

## 2020-08-28 DIAGNOSIS — I10 ESSENTIAL HYPERTENSION: ICD-10-CM

## 2020-08-28 LAB
ALBUMIN SERPL-MCNC: 3.6 G/DL (ref 3.4–5)
ANION GAP SERPL CALC-SCNC: 7 MMOL/L (ref 0–18)
BACTERIA UR QL AUTO: NEGATIVE /HPF
BILIRUB UR QL: NEGATIVE
BUN BLD-MCNC: 29 MG/DL (ref 7–18)
BUN/CREAT SERPL: 17.4 (ref 10–20)
CALCIUM BLD-MCNC: 9.9 MG/DL (ref 8.5–10.1)
CHLORIDE SERPL-SCNC: 104 MMOL/L (ref 98–112)
CLARITY UR: CLEAR
CO2 SERPL-SCNC: 27 MMOL/L (ref 21–32)
COLOR UR: YELLOW
CREAT BLD-MCNC: 1.67 MG/DL (ref 0.55–1.02)
CREAT UR-SCNC: 35.6 MG/DL
GLUCOSE BLD-MCNC: 103 MG/DL (ref 70–99)
GLUCOSE UR-MCNC: NEGATIVE MG/DL
HGB UR QL STRIP.AUTO: NEGATIVE
KETONES UR-MCNC: NEGATIVE MG/DL
MICROALBUMIN UR-MCNC: <0.5 MG/DL
NITRITE UR QL STRIP.AUTO: NEGATIVE
OSMOLALITY SERPL CALC.SUM OF ELEC: 292 MOSM/KG (ref 275–295)
PH UR: 6 [PH] (ref 5–8)
PHOSPHATE SERPL-MCNC: 3 MG/DL (ref 2.5–4.9)
POTASSIUM SERPL-SCNC: 5 MMOL/L (ref 3.5–5.1)
PROT UR-MCNC: NEGATIVE MG/DL
RBC #/AREA URNS AUTO: <1 /HPF
SODIUM SERPL-SCNC: 138 MMOL/L (ref 136–145)
SP GR UR STRIP: 1 (ref 1–1.03)
TSI SER-ACNC: 1.24 MIU/ML (ref 0.36–3.74)
UROBILINOGEN UR STRIP-ACNC: <2
WBC #/AREA URNS AUTO: <1 /HPF

## 2020-08-28 PROCEDURE — 36415 COLL VENOUS BLD VENIPUNCTURE: CPT

## 2020-08-28 PROCEDURE — 81001 URINALYSIS AUTO W/SCOPE: CPT

## 2020-08-28 PROCEDURE — 80069 RENAL FUNCTION PANEL: CPT

## 2020-08-28 PROCEDURE — 84443 ASSAY THYROID STIM HORMONE: CPT

## 2020-08-28 PROCEDURE — 82043 UR ALBUMIN QUANTITATIVE: CPT

## 2020-08-28 PROCEDURE — 82570 ASSAY OF URINE CREATININE: CPT

## 2020-09-03 DIAGNOSIS — E03.9 HYPOTHYROIDISM, UNSPECIFIED TYPE: ICD-10-CM

## 2020-09-03 RX ORDER — LEVOTHYROXINE SODIUM 0.05 MG/1
50 TABLET ORAL
Qty: 90 TABLET | Refills: 3 | Status: SHIPPED | OUTPATIENT
Start: 2020-09-03 | End: 2020-12-20

## 2020-09-14 ENCOUNTER — TELEPHONE (OUTPATIENT)
Dept: FAMILY MEDICINE CLINIC | Facility: CLINIC | Age: 70
End: 2020-09-14

## 2020-09-14 ENCOUNTER — OFFICE VISIT (OUTPATIENT)
Dept: NEPHROLOGY | Facility: CLINIC | Age: 70
End: 2020-09-14
Payer: MEDICARE

## 2020-09-14 VITALS
HEIGHT: 62 IN | SYSTOLIC BLOOD PRESSURE: 144 MMHG | DIASTOLIC BLOOD PRESSURE: 82 MMHG | BODY MASS INDEX: 32.79 KG/M2 | TEMPERATURE: 97 F | HEART RATE: 68 BPM | WEIGHT: 178.19 LBS

## 2020-09-14 DIAGNOSIS — Q61.3 POLYCYSTIC KIDNEY DISEASE: ICD-10-CM

## 2020-09-14 DIAGNOSIS — Q61.2 ADULT POLYCYSTIC KIDNEY DISEASE: ICD-10-CM

## 2020-09-14 DIAGNOSIS — N18.30 CKD (CHRONIC KIDNEY DISEASE), STAGE III (HCC): Primary | ICD-10-CM

## 2020-09-14 DIAGNOSIS — I10 ESSENTIAL HYPERTENSION: ICD-10-CM

## 2020-09-14 DIAGNOSIS — N18.30 STAGE 3 CHRONIC KIDNEY DISEASE (HCC): Primary | ICD-10-CM

## 2020-09-14 PROCEDURE — 3008F BODY MASS INDEX DOCD: CPT | Performed by: INTERNAL MEDICINE

## 2020-09-14 PROCEDURE — 99213 OFFICE O/P EST LOW 20 MIN: CPT | Performed by: INTERNAL MEDICINE

## 2020-09-14 PROCEDURE — 3077F SYST BP >= 140 MM HG: CPT | Performed by: INTERNAL MEDICINE

## 2020-09-14 PROCEDURE — 3079F DIAST BP 80-89 MM HG: CPT | Performed by: INTERNAL MEDICINE

## 2020-09-14 NOTE — PROGRESS NOTES
Progress Note:      Patient is a 81 yrs old female with pmh of CKD stage III, polycystic kidney disease, HTN x 20 yrs, Hypothyroidism, Asthma who presented for follow up     Started following with Dr. Erinn Wild in 2018.  Recent Lab test showed BUN/Cr 40/1.4 mg (40 mg total) by mouth daily. 90 tablet 3   • Metoprolol Succinate  MG Oral Tablet 24 Hr Take 1 tablet (100 mg total) by mouth daily.  90 tablet 3   • Albuterol Sulfate HFA (PROAIR HFA) 108 (90 Base) MCG/ACT Inhalation Aero Soln Inhale 2 puffs into th concern of polycystic kidney disease - no known FH of CKD or RRT    - Creatinine in 2/2018 was1.37 mg/dl with an eGFR 41 ml/min -> has been stable around 1.5-1.6 mg/dl with an eGFR 32-36 ml/min   - state son might be interested in donating the kidney when

## 2020-11-11 ENCOUNTER — OFFICE VISIT (OUTPATIENT)
Dept: FAMILY MEDICINE CLINIC | Facility: CLINIC | Age: 70
End: 2020-11-11
Payer: MEDICARE

## 2020-11-11 VITALS
WEIGHT: 180 LBS | TEMPERATURE: 98 F | SYSTOLIC BLOOD PRESSURE: 157 MMHG | BODY MASS INDEX: 33.13 KG/M2 | DIASTOLIC BLOOD PRESSURE: 79 MMHG | HEART RATE: 76 BPM | HEIGHT: 62 IN

## 2020-11-11 DIAGNOSIS — E66.9 OBESITY (BMI 30.0-34.9): ICD-10-CM

## 2020-11-11 DIAGNOSIS — J45.20 MILD INTERMITTENT REACTIVE AIRWAY DISEASE WITHOUT COMPLICATION: ICD-10-CM

## 2020-11-11 DIAGNOSIS — Z23 NEED FOR INFLUENZA VACCINATION: ICD-10-CM

## 2020-11-11 DIAGNOSIS — N18.32 STAGE 3B CHRONIC KIDNEY DISEASE (HCC): ICD-10-CM

## 2020-11-11 DIAGNOSIS — E03.9 HYPOTHYROIDISM, UNSPECIFIED TYPE: ICD-10-CM

## 2020-11-11 DIAGNOSIS — I10 ESSENTIAL HYPERTENSION: Primary | ICD-10-CM

## 2020-11-11 PROCEDURE — 3008F BODY MASS INDEX DOCD: CPT | Performed by: FAMILY MEDICINE

## 2020-11-11 PROCEDURE — 3077F SYST BP >= 140 MM HG: CPT | Performed by: FAMILY MEDICINE

## 2020-11-11 PROCEDURE — G0008 ADMIN INFLUENZA VIRUS VAC: HCPCS | Performed by: FAMILY MEDICINE

## 2020-11-11 PROCEDURE — 99213 OFFICE O/P EST LOW 20 MIN: CPT | Performed by: FAMILY MEDICINE

## 2020-11-11 PROCEDURE — 90662 IIV NO PRSV INCREASED AG IM: CPT | Performed by: FAMILY MEDICINE

## 2020-11-11 PROCEDURE — 3078F DIAST BP <80 MM HG: CPT | Performed by: FAMILY MEDICINE

## 2020-11-11 NOTE — PROGRESS NOTES
HPI:    Patient ID: Gloria Shirley is a 78year old female. HPI  Patient presents with:  Blood Pressure: follow up   Follow - Up: Thyroidism     Patient overall doing well. She states her blood pressures at home usually are around 1 77-9 20 systolic.   She heard.  Pulmonary/Chest: Effort normal and breath sounds normal. She has no wheezes. Musculoskeletal:         General: No edema. Neurological: She is alert and oriented to person, place, and time. Skin: Skin is warm and dry. No rash noted.

## 2020-11-11 NOTE — PATIENT INSTRUCTIONS
Radha Wells's SCREENING SCHEDULE   Tests on this list are recommended by your physician but may not be covered, or covered at this frequency, by your insurer. Please check with your insurance carrier before scheduling to verify coverage.    PREVENTATIVE S often if abnormal Colonoscopy due on 02/24/2024 Update [facility] if applicable    Flex Sigmoidoscopy Screen  Covered every 5 years No results found for this or any previous visit. No flowsheet data found.      Fecal Occult Blood   Covered Annually performed in visit on 02/25/19   • PNEUMOCOCCAL VACC, 13 HARRIETT IM    Please get once after your 65th birthday    Pneumococcal 23 (Pneumovax)  Covered Once after 65 Orders placed or performed in visit on 02/05/18   • PNEUMOCOCCAL IMM (PNEUMOVAX)    Please get

## 2020-11-11 NOTE — PROGRESS NOTES
HPI:   Mikey Wall is a 78year old female who presents for a {Medicare Annual Wellness Description:8334}.     ***  Annual Physical due on 11/11/2021        Fall/Risk Assessment   She has been screened for Falls and is low risk:      Cognitive Assessment : 178 lb 3.2 oz (80.8 kg)  06/24/20 : 175 lb (79.4 kg)     Last Cholesterol Labs:   Lab Results   Component Value Date    CHOLEST 185 03/07/2020    HDL 49 03/07/2020     (H) 03/07/2020    TRIG 142 03/07/2020          Last Chemistry Labs:   Lab Resul index is 32.92 kg/m² as calculated from the following:    Height as of this encounter: 5' 2\" (1.575 m). Weight as of this encounter: 180 lb (81.6 kg).     Medicare Hearing Assessment  (Required for AWV/SWV)    {Hearing flowsheet blank or this link needs Cholesterol (mg/dL)   Date Value   03/07/2020 108 (H)        EKG - w/ Initial Preventative Physical Exam only, or if medically necessary Electrocardiogram date03/04/2020       Colorectal Cancer Screening      Colonoscopy Screen every 10 years Colonoscopy d covered with a cut with metal- TD and TDaP Not covered by Medicare Part B No vaccine history found This may be covered with your prescription benefits, but Medicare does not cover unless Medically needed    Zoster  Not covered by Medicare Part B No vaccine

## 2020-12-20 DIAGNOSIS — E03.9 HYPOTHYROIDISM, UNSPECIFIED TYPE: ICD-10-CM

## 2020-12-22 RX ORDER — LEVOTHYROXINE SODIUM 0.05 MG/1
50 TABLET ORAL
Qty: 90 TABLET | Refills: 3 | Status: SHIPPED | OUTPATIENT
Start: 2020-12-22 | End: 2021-09-13

## 2021-01-12 ENCOUNTER — LAB ENCOUNTER (OUTPATIENT)
Dept: LAB | Age: 71
End: 2021-01-12
Attending: INTERNAL MEDICINE
Payer: MEDICARE

## 2021-01-12 DIAGNOSIS — Q61.2 ADULT POLYCYSTIC KIDNEY DISEASE: ICD-10-CM

## 2021-01-12 DIAGNOSIS — N18.30 CKD (CHRONIC KIDNEY DISEASE), STAGE III (HCC): ICD-10-CM

## 2021-01-12 LAB
ALBUMIN SERPL-MCNC: 3.7 G/DL (ref 3.4–5)
ANION GAP SERPL CALC-SCNC: 3 MMOL/L (ref 0–18)
BUN BLD-MCNC: 32 MG/DL (ref 7–18)
BUN/CREAT SERPL: 21.2 (ref 10–20)
CALCIUM BLD-MCNC: 9.9 MG/DL (ref 8.5–10.1)
CHLORIDE SERPL-SCNC: 105 MMOL/L (ref 98–112)
CO2 SERPL-SCNC: 27 MMOL/L (ref 21–32)
CREAT BLD-MCNC: 1.51 MG/DL
GLUCOSE BLD-MCNC: 96 MG/DL (ref 70–99)
OSMOLALITY SERPL CALC.SUM OF ELEC: 287 MOSM/KG (ref 275–295)
PHOSPHATE SERPL-MCNC: 2.8 MG/DL (ref 2.5–4.9)
POTASSIUM SERPL-SCNC: 4.5 MMOL/L (ref 3.5–5.1)
SODIUM SERPL-SCNC: 135 MMOL/L (ref 136–145)

## 2021-01-12 PROCEDURE — 80069 RENAL FUNCTION PANEL: CPT

## 2021-01-12 PROCEDURE — 36415 COLL VENOUS BLD VENIPUNCTURE: CPT

## 2021-02-02 DIAGNOSIS — Z23 NEED FOR VACCINATION: ICD-10-CM

## 2021-02-15 ENCOUNTER — IMMUNIZATION (OUTPATIENT)
Dept: LAB | Age: 71
End: 2021-02-15
Attending: HOSPITALIST
Payer: MEDICARE

## 2021-02-15 DIAGNOSIS — Z23 NEED FOR VACCINATION: Primary | ICD-10-CM

## 2021-02-15 PROCEDURE — 0001A SARSCOV2 VAC 30MCG/0.3ML IM: CPT

## 2021-03-01 ENCOUNTER — LAB ENCOUNTER (OUTPATIENT)
Dept: LAB | Age: 71
End: 2021-03-01
Attending: FAMILY MEDICINE
Payer: MEDICARE

## 2021-03-01 ENCOUNTER — OFFICE VISIT (OUTPATIENT)
Dept: FAMILY MEDICINE CLINIC | Facility: CLINIC | Age: 71
End: 2021-03-01
Payer: MEDICARE

## 2021-03-01 VITALS
WEIGHT: 180 LBS | TEMPERATURE: 98 F | BODY MASS INDEX: 33.13 KG/M2 | SYSTOLIC BLOOD PRESSURE: 150 MMHG | HEIGHT: 62 IN | DIASTOLIC BLOOD PRESSURE: 82 MMHG | HEART RATE: 70 BPM

## 2021-03-01 DIAGNOSIS — E55.9 VITAMIN D DEFICIENCY: ICD-10-CM

## 2021-03-01 DIAGNOSIS — E03.9 HYPOTHYROIDISM, UNSPECIFIED TYPE: ICD-10-CM

## 2021-03-01 DIAGNOSIS — J45.20 MILD INTERMITTENT REACTIVE AIRWAY DISEASE WITHOUT COMPLICATION: ICD-10-CM

## 2021-03-01 DIAGNOSIS — N18.32 STAGE 3B CHRONIC KIDNEY DISEASE (HCC): ICD-10-CM

## 2021-03-01 DIAGNOSIS — I10 ESSENTIAL HYPERTENSION: ICD-10-CM

## 2021-03-01 DIAGNOSIS — Z12.31 ENCOUNTER FOR SCREENING MAMMOGRAM FOR BREAST CANCER: ICD-10-CM

## 2021-03-01 DIAGNOSIS — Q61.3 POLYCYSTIC KIDNEY DISEASE: ICD-10-CM

## 2021-03-01 DIAGNOSIS — E66.9 OBESITY (BMI 30.0-34.9): ICD-10-CM

## 2021-03-01 DIAGNOSIS — Z23 NEED FOR SHINGLES VACCINE: ICD-10-CM

## 2021-03-01 DIAGNOSIS — Z00.00 ENCOUNTER FOR ANNUAL HEALTH EXAMINATION: Primary | ICD-10-CM

## 2021-03-01 PROBLEM — I83.813 VARICOSE VEINS OF BOTH LOWER EXTREMITIES WITH PAIN: Status: RESOLVED | Noted: 2019-02-25 | Resolved: 2021-03-01

## 2021-03-01 LAB
ALBUMIN SERPL-MCNC: 3.4 G/DL (ref 3.4–5)
ALBUMIN/GLOB SERPL: 0.9 {RATIO} (ref 1–2)
ALP LIVER SERPL-CCNC: 104 U/L
ALT SERPL-CCNC: 19 U/L
ANION GAP SERPL CALC-SCNC: 4 MMOL/L (ref 0–18)
AST SERPL-CCNC: 14 U/L (ref 15–37)
BILIRUB SERPL-MCNC: 0.4 MG/DL (ref 0.1–2)
BUN BLD-MCNC: 23 MG/DL (ref 7–18)
BUN/CREAT SERPL: 16.5 (ref 10–20)
CALCIUM BLD-MCNC: 9.7 MG/DL (ref 8.5–10.1)
CHLORIDE SERPL-SCNC: 105 MMOL/L (ref 98–112)
CO2 SERPL-SCNC: 31 MMOL/L (ref 21–32)
CREAT BLD-MCNC: 1.39 MG/DL
GLOBULIN PLAS-MCNC: 3.9 G/DL (ref 2.8–4.4)
GLUCOSE BLD-MCNC: 103 MG/DL (ref 70–99)
M PROTEIN MFR SERPL ELPH: 7.3 G/DL (ref 6.4–8.2)
OSMOLALITY SERPL CALC.SUM OF ELEC: 294 MOSM/KG (ref 275–295)
PATIENT FASTING Y/N/NP: NO
POTASSIUM SERPL-SCNC: 4.5 MMOL/L (ref 3.5–5.1)
SODIUM SERPL-SCNC: 140 MMOL/L (ref 136–145)
T4 FREE SERPL-MCNC: 1.3 NG/DL (ref 0.8–1.7)
TSI SER-ACNC: 2.4 MIU/ML (ref 0.36–3.74)

## 2021-03-01 PROCEDURE — 3079F DIAST BP 80-89 MM HG: CPT | Performed by: FAMILY MEDICINE

## 2021-03-01 PROCEDURE — 80053 COMPREHEN METABOLIC PANEL: CPT

## 2021-03-01 PROCEDURE — G0439 PPPS, SUBSEQ VISIT: HCPCS | Performed by: FAMILY MEDICINE

## 2021-03-01 PROCEDURE — 84443 ASSAY THYROID STIM HORMONE: CPT

## 2021-03-01 PROCEDURE — 84439 ASSAY OF FREE THYROXINE: CPT

## 2021-03-01 PROCEDURE — 36415 COLL VENOUS BLD VENIPUNCTURE: CPT

## 2021-03-01 PROCEDURE — 3008F BODY MASS INDEX DOCD: CPT | Performed by: FAMILY MEDICINE

## 2021-03-01 PROCEDURE — 82306 VITAMIN D 25 HYDROXY: CPT

## 2021-03-01 PROCEDURE — 96160 PT-FOCUSED HLTH RISK ASSMT: CPT | Performed by: FAMILY MEDICINE

## 2021-03-01 PROCEDURE — 3077F SYST BP >= 140 MM HG: CPT | Performed by: FAMILY MEDICINE

## 2021-03-01 PROCEDURE — 99397 PER PM REEVAL EST PAT 65+ YR: CPT | Performed by: FAMILY MEDICINE

## 2021-03-01 NOTE — PROGRESS NOTES
HPI:   Krissy Fairchild is a 78year old female who presents for a MA (Medicare Advantage) Supervisit (Once per calendar year).     Doing well  Bps at home 130-140s/ 80s  Her last annual assessment has been over 1 year: Annual Physical due on 03/01/2022 Cholesterol Labs:   Lab Results   Component Value Date    CHOLEST 185 03/07/2020    HDL 49 03/07/2020     (H) 03/07/2020    TRIG 142 03/07/2020          Last Chemistry Labs:   Lab Results   Component Value Date    AST 10 (L) 03/04/2020    ALT 15 03/ as of this encounter: 5' 2\" (1.575 m). Weight as of this encounter: 180 lb (81.6 kg).     Medicare Hearing Assessment  (Required for AWV/SWV)    Hearing Screening    Screening Method: Questionnaire  I have a problem hearing over the telephone: No I have mucosa, and tongue normal; teeth and gums normal   Neck: Supple, symmetrical, trachea midline, no adenopathy;  thyroid: not enlarged, symmetric, no tenderness/mass/nodules; no carotid bruit or JVD   Back:   Symmetric, no curvature, ROM normal, no CVA tende disease    Mild intermittent reactive airway disease without complication    Encounter for screening mammogram for breast cancer  -     [facility] MARK 2D+3D SCREENING BILAT (CPT=77067/29216); Future    Need for shingles vaccine       1.  Encounter for annual heal patient  PREVENTATIVE SERVICES  INDICATIONS AND SCHEDULE Internal Lab or Procedure External Lab or Procedure   Diabetes Screening      HbgA1C   Annually No results found for: A1C No flowsheet data found.     Fasting Blood Sugar (FSB)Annually Glucose (mg/dL) for Moderate/High Risk No vaccine history found Medium/high risk factors:   End-stage renal disease   Hemophiliacs who received Factor VIII or IX concentrates   Clients of institutions for the mentally retarded   Persons who live in the same house as a Hep

## 2021-03-01 NOTE — PATIENT INSTRUCTIONS
Radha Wells's SCREENING SCHEDULE   Tests on this list are recommended by your physician but may not be covered, or covered at this frequency, by your insurer. Please check with your insurance carrier before scheduling to verify coverage.    PREVENTATIVE S if abnormal Colonoscopy due on 02/24/2024 Update [facility] if applicable    Flex Sigmoidoscopy Screen  Covered every 5 years No results found for this or any previous visit. No flowsheet data found.      Fecal Occult Blood   Covered Annually No res performed in visit on 02/25/19   • PNEUMOCOCCAL VACC, 13 HARRIETT IM    Please get once after your 65th birthday    Pneumococcal 23 (Pneumovax)  Covered Once after 65 Orders placed or performed in visit on 02/05/18   • PNEUMOCOCCAL IMM (PNEUMOVAX)    Please get

## 2021-03-03 LAB — 25(OH)D3 SERPL-MCNC: 21.4 NG/ML (ref 30–100)

## 2021-03-08 ENCOUNTER — IMMUNIZATION (OUTPATIENT)
Dept: LAB | Age: 71
End: 2021-03-08
Attending: HOSPITALIST
Payer: MEDICARE

## 2021-03-08 DIAGNOSIS — Z23 NEED FOR VACCINATION: Primary | ICD-10-CM

## 2021-03-08 PROCEDURE — 0002A SARSCOV2 VAC 30MCG/0.3ML IM: CPT

## 2021-03-19 ENCOUNTER — OFFICE VISIT (OUTPATIENT)
Dept: NEPHROLOGY | Facility: CLINIC | Age: 71
End: 2021-03-19
Payer: MEDICARE

## 2021-03-19 VITALS
HEIGHT: 62 IN | SYSTOLIC BLOOD PRESSURE: 145 MMHG | WEIGHT: 183 LBS | HEART RATE: 70 BPM | BODY MASS INDEX: 33.68 KG/M2 | DIASTOLIC BLOOD PRESSURE: 76 MMHG

## 2021-03-19 DIAGNOSIS — Q61.3 POLYCYSTIC KIDNEY DISEASE: ICD-10-CM

## 2021-03-19 DIAGNOSIS — I10 ESSENTIAL HYPERTENSION: ICD-10-CM

## 2021-03-19 DIAGNOSIS — N18.31 STAGE 3A CHRONIC KIDNEY DISEASE (HCC): Primary | ICD-10-CM

## 2021-03-19 PROCEDURE — 3078F DIAST BP <80 MM HG: CPT | Performed by: INTERNAL MEDICINE

## 2021-03-19 PROCEDURE — 3008F BODY MASS INDEX DOCD: CPT | Performed by: INTERNAL MEDICINE

## 2021-03-19 PROCEDURE — 3077F SYST BP >= 140 MM HG: CPT | Performed by: INTERNAL MEDICINE

## 2021-03-19 PROCEDURE — 99214 OFFICE O/P EST MOD 30 MIN: CPT | Performed by: INTERNAL MEDICINE

## 2021-03-19 RX ORDER — ACETAMINOPHEN 160 MG
2000 TABLET,DISINTEGRATING ORAL DAILY
COMMUNITY

## 2021-03-19 NOTE — PROGRESS NOTES
Progress Note:      Patient is a 78 yrs old female with pmh of CKD stage III, polycystic kidney disease, HTN x 20 yrs, Hypothyroidism, Asthma who presented for follow up     Started following with Dr. Ulysses Richmond in 2018.  Recent Lab test showed BUN/Cr 40/1.4 mg (2000 UT) Oral Cap Take 2,000 Units by mouth daily. • Levothyroxine Sodium 50 MCG Oral Tab Take 1 tablet (50 mcg total) by mouth before breakfast. 90 tablet 3   • lisinopril 40 MG Oral Tab Take 1 tablet (40 mg total) by mouth daily.  90 tablet 3   • Met present  Back/Spine: no abnormalities noted  Musculoskeletal:  no deformities  Extremities: no edema  Neurological:  Grossly normal       ASSESSMENT/PLAN:     1.  PKD: CKD stage III:   - renal US concern of polycystic kidney disease - no known FH of CKD or

## 2021-04-12 ENCOUNTER — HOSPITAL ENCOUNTER (OUTPATIENT)
Dept: MAMMOGRAPHY | Age: 71
Discharge: HOME | End: 2021-04-12
Attending: FAMILY MEDICINE
Payer: MEDICARE

## 2021-04-12 DIAGNOSIS — Z12.31 ENCOUNTER FOR SCREENING MAMMOGRAM FOR BREAST CANCER: ICD-10-CM

## 2021-04-12 PROCEDURE — 77063 BREAST TOMOSYNTHESIS BI: CPT | Performed by: FAMILY MEDICINE

## 2021-04-12 PROCEDURE — 77067 SCR MAMMO BI INCL CAD: CPT | Performed by: FAMILY MEDICINE

## 2021-05-03 NOTE — TELEPHONE ENCOUNTER
Discharge paperwork given to pt. All questions and concerns addressed at this time. Pt discharged home in no acute distress and acting age appropriate. Education given to pt about taking albuterol inhaler at home and about steroid dose and about following up with PCP. Pt verbalized understanding and has no further questions at this time. Patient returned call, informed is eligible to schedule 2020 Medicare AHA visit, scheduled for 2/13/2020.

## 2021-09-08 ENCOUNTER — LAB ENCOUNTER (OUTPATIENT)
Dept: LAB | Age: 71
End: 2021-09-08
Attending: FAMILY MEDICINE
Payer: MEDICARE

## 2021-09-08 ENCOUNTER — OFFICE VISIT (OUTPATIENT)
Dept: FAMILY MEDICINE CLINIC | Facility: CLINIC | Age: 71
End: 2021-09-08
Payer: MEDICARE

## 2021-09-08 VITALS — WEIGHT: 178 LBS | TEMPERATURE: 98 F | HEIGHT: 62 IN | BODY MASS INDEX: 32.76 KG/M2

## 2021-09-08 DIAGNOSIS — E66.9 OBESITY (BMI 30.0-34.9): ICD-10-CM

## 2021-09-08 DIAGNOSIS — Q61.3 POLYCYSTIC KIDNEY DISEASE: ICD-10-CM

## 2021-09-08 DIAGNOSIS — E55.9 VITAMIN D DEFICIENCY: ICD-10-CM

## 2021-09-08 DIAGNOSIS — N18.32 STAGE 3B CHRONIC KIDNEY DISEASE (HCC): ICD-10-CM

## 2021-09-08 DIAGNOSIS — I10 ESSENTIAL HYPERTENSION: ICD-10-CM

## 2021-09-08 DIAGNOSIS — J45.20 MILD INTERMITTENT REACTIVE AIRWAY DISEASE WITHOUT COMPLICATION: ICD-10-CM

## 2021-09-08 DIAGNOSIS — E03.9 HYPOTHYROIDISM, UNSPECIFIED TYPE: ICD-10-CM

## 2021-09-08 DIAGNOSIS — Z23 NEED FOR SHINGLES VACCINE: ICD-10-CM

## 2021-09-08 DIAGNOSIS — I10 ESSENTIAL HYPERTENSION: Primary | ICD-10-CM

## 2021-09-08 DIAGNOSIS — N18.31 STAGE 3A CHRONIC KIDNEY DISEASE (HCC): ICD-10-CM

## 2021-09-08 LAB
ALBUMIN SERPL-MCNC: 3.8 G/DL (ref 3.4–5)
ANION GAP SERPL CALC-SCNC: 3 MMOL/L (ref 0–18)
BUN BLD-MCNC: 27 MG/DL (ref 7–18)
BUN/CREAT SERPL: 18.8 (ref 10–20)
CALCIUM BLD-MCNC: 10 MG/DL (ref 8.5–10.1)
CHLORIDE SERPL-SCNC: 107 MMOL/L (ref 98–112)
CO2 SERPL-SCNC: 28 MMOL/L (ref 21–32)
CREAT BLD-MCNC: 1.44 MG/DL
GLUCOSE BLD-MCNC: 99 MG/DL (ref 70–99)
OSMOLALITY SERPL CALC.SUM OF ELEC: 291 MOSM/KG (ref 275–295)
PHOSPHATE SERPL-MCNC: 3.6 MG/DL (ref 2.5–4.9)
POTASSIUM SERPL-SCNC: 4.9 MMOL/L (ref 3.5–5.1)
PTH-INTACT SERPL-MCNC: 98.3 PG/ML (ref 18.5–88)
SODIUM SERPL-SCNC: 138 MMOL/L (ref 136–145)
TSI SER-ACNC: 2.05 MIU/ML (ref 0.36–3.74)
VIT D+METAB SERPL-MCNC: 46.5 NG/ML (ref 30–100)

## 2021-09-08 PROCEDURE — 82306 VITAMIN D 25 HYDROXY: CPT

## 2021-09-08 PROCEDURE — 3008F BODY MASS INDEX DOCD: CPT | Performed by: FAMILY MEDICINE

## 2021-09-08 PROCEDURE — 83970 ASSAY OF PARATHORMONE: CPT

## 2021-09-08 PROCEDURE — 84443 ASSAY THYROID STIM HORMONE: CPT

## 2021-09-08 PROCEDURE — 80069 RENAL FUNCTION PANEL: CPT

## 2021-09-08 PROCEDURE — 99214 OFFICE O/P EST MOD 30 MIN: CPT | Performed by: FAMILY MEDICINE

## 2021-09-08 PROCEDURE — 36415 COLL VENOUS BLD VENIPUNCTURE: CPT

## 2021-09-08 RX ORDER — METOPROLOL SUCCINATE 100 MG/1
100 TABLET, EXTENDED RELEASE ORAL DAILY
Qty: 90 TABLET | Refills: 1 | Status: SHIPPED | OUTPATIENT
Start: 2021-09-08

## 2021-09-08 RX ORDER — LISINOPRIL 40 MG/1
40 TABLET ORAL DAILY
Qty: 90 TABLET | Refills: 1 | Status: SHIPPED | OUTPATIENT
Start: 2021-09-08

## 2021-09-08 NOTE — TELEPHONE ENCOUNTER
Please review. Protocol failed / No protocol.     Requested Prescriptions   Pending Prescriptions Disp Refills    LISINOPRIL 40 MG Oral Tab [Pharmacy Med Name: Lisinopril 40 MG Oral Tablet] 90 tablet 0     Sig: Take 1 tablet by mouth once daily        Hype

## 2021-09-08 NOTE — PROGRESS NOTES
HPI:    Patient ID: Doris Munson is a 78year old female.     HPI  Patient presents with:  Blood Pressure: follow up   Medication Request    BP Readings from Last 3 Encounters:  03/19/21 : 145/76  03/01/21 : 150/82  11/11/20 : 157/79    Wt Readings from Last Pupils: Pupils are equal, round, and reactive to light. Neck:      Thyroid: No thyromegaly. Cardiovascular:      Rate and Rhythm: Normal rate and regular rhythm. Heart sounds: No murmur heard.      Pulmonary:      Effort: Pulmonary effort is normal

## 2021-09-13 DIAGNOSIS — E03.9 HYPOTHYROIDISM, UNSPECIFIED TYPE: ICD-10-CM

## 2021-09-13 RX ORDER — LEVOTHYROXINE SODIUM 0.05 MG/1
50 TABLET ORAL
Qty: 90 TABLET | Refills: 3 | Status: SHIPPED | OUTPATIENT
Start: 2021-09-13

## 2021-12-16 ENCOUNTER — LAB ENCOUNTER (OUTPATIENT)
Dept: LAB | Age: 71
End: 2021-12-16
Attending: FAMILY MEDICINE
Payer: MEDICARE

## 2021-12-16 ENCOUNTER — OFFICE VISIT (OUTPATIENT)
Dept: FAMILY MEDICINE CLINIC | Facility: CLINIC | Age: 71
End: 2021-12-16
Payer: MEDICARE

## 2021-12-16 VITALS
SYSTOLIC BLOOD PRESSURE: 146 MMHG | DIASTOLIC BLOOD PRESSURE: 84 MMHG | HEIGHT: 62 IN | BODY MASS INDEX: 32.76 KG/M2 | HEART RATE: 81 BPM | TEMPERATURE: 98 F | WEIGHT: 178 LBS | OXYGEN SATURATION: 99 %

## 2021-12-16 DIAGNOSIS — Q61.3 POLYCYSTIC KIDNEY DISEASE: ICD-10-CM

## 2021-12-16 DIAGNOSIS — N18.32 STAGE 3B CHRONIC KIDNEY DISEASE (HCC): ICD-10-CM

## 2021-12-16 DIAGNOSIS — M54.50 ACUTE RIGHT-SIDED LOW BACK PAIN WITHOUT SCIATICA: ICD-10-CM

## 2021-12-16 DIAGNOSIS — I10 ESSENTIAL HYPERTENSION: ICD-10-CM

## 2021-12-16 DIAGNOSIS — I10 ESSENTIAL HYPERTENSION: Primary | ICD-10-CM

## 2021-12-16 PROCEDURE — 83970 ASSAY OF PARATHORMONE: CPT

## 2021-12-16 PROCEDURE — 90662 IIV NO PRSV INCREASED AG IM: CPT | Performed by: FAMILY MEDICINE

## 2021-12-16 PROCEDURE — 3008F BODY MASS INDEX DOCD: CPT | Performed by: FAMILY MEDICINE

## 2021-12-16 PROCEDURE — 80069 RENAL FUNCTION PANEL: CPT

## 2021-12-16 PROCEDURE — 36415 COLL VENOUS BLD VENIPUNCTURE: CPT

## 2021-12-16 PROCEDURE — G0008 ADMIN INFLUENZA VIRUS VAC: HCPCS | Performed by: FAMILY MEDICINE

## 2021-12-16 PROCEDURE — 99214 OFFICE O/P EST MOD 30 MIN: CPT | Performed by: FAMILY MEDICINE

## 2021-12-16 PROCEDURE — 3077F SYST BP >= 140 MM HG: CPT | Performed by: FAMILY MEDICINE

## 2021-12-16 PROCEDURE — 3079F DIAST BP 80-89 MM HG: CPT | Performed by: FAMILY MEDICINE

## 2021-12-16 RX ORDER — CYCLOBENZAPRINE HCL 5 MG
5 TABLET ORAL NIGHTLY PRN
Qty: 30 TABLET | Refills: 0 | Status: SHIPPED | OUTPATIENT
Start: 2021-12-16

## 2021-12-16 NOTE — PROGRESS NOTES
HPI:    Patient ID: Oscar Soriano is a 73year old female.     HPI  Patient presents with:  Blood Pressure: follow up   Low Back Pain: X 3 days pt states started after picking up the bathroom trash       BP Readings from Last 3 Encounters:  12/16/21 : 146/84 Aero Soln Inhale 2 puffs into the lungs every 6 (six) hours as needed for Wheezing.  1 Inhaler 3     Allergies:  Seasonal                   PHYSICAL EXAM:   Patient presents with:  Blood Pressure: follow up   Low Back Pain: X 3 days pt states started after note was creating using Dragon speech recognition technology.  Please excuse any typos    Meds This Visit:  Requested Prescriptions     Signed Prescriptions Disp Refills   • cyclobenzaprine 5 MG Oral Tab 30 tablet 0     Sig: Take 1 tablet (5 mg total) by mo

## 2021-12-16 NOTE — PATIENT INSTRUCTIONS
Back Care Tips     Caring for your back  These are things you can do to prevent a recurrence of acute back pain and to reduce symptoms from chronic back pain:  · Stay at a healthy weight.  If you are overweight, losing weight will help most types of back careful if you are given prescription pain medicines, opioids, or medicine for muscle spasm. They can cause drowsiness, and affect your coordination, reflexes, and judgment. Don't drive or operate heavy machinery while taking these types of medicines.  Take pillows under your knees to support your legs in a slightly flexed position. Use a firm mattress. If your mattress sags, replace it, or use a 1/2-inch plywood board under the mattress to add support.   Follow-up care  Follow up with your healthcare provider day. To make a cold pack, put ice cubes in a plastic bag that seals at the top. · After the first few days, try heat for 15 minutes at a time to ease pain. Never sleep on a heating pad. · Over-the-counter medicine can help control pain and swelling.  Try

## 2021-12-21 ENCOUNTER — OFFICE VISIT (OUTPATIENT)
Dept: NEPHROLOGY | Facility: CLINIC | Age: 71
End: 2021-12-21
Payer: MEDICARE

## 2021-12-21 VITALS
WEIGHT: 180 LBS | SYSTOLIC BLOOD PRESSURE: 178 MMHG | HEIGHT: 62 IN | DIASTOLIC BLOOD PRESSURE: 97 MMHG | BODY MASS INDEX: 33.13 KG/M2 | HEART RATE: 68 BPM

## 2021-12-21 DIAGNOSIS — Q61.3 POLYCYSTIC KIDNEY DISEASE: ICD-10-CM

## 2021-12-21 DIAGNOSIS — I10 ESSENTIAL HYPERTENSION: ICD-10-CM

## 2021-12-21 DIAGNOSIS — N18.31 STAGE 3A CHRONIC KIDNEY DISEASE (HCC): Primary | ICD-10-CM

## 2021-12-21 PROCEDURE — 3080F DIAST BP >= 90 MM HG: CPT | Performed by: INTERNAL MEDICINE

## 2021-12-21 PROCEDURE — 3077F SYST BP >= 140 MM HG: CPT | Performed by: INTERNAL MEDICINE

## 2021-12-21 PROCEDURE — 99214 OFFICE O/P EST MOD 30 MIN: CPT | Performed by: INTERNAL MEDICINE

## 2021-12-21 PROCEDURE — 3008F BODY MASS INDEX DOCD: CPT | Performed by: INTERNAL MEDICINE

## 2021-12-21 RX ORDER — AMLODIPINE BESYLATE 5 MG/1
5 TABLET ORAL DAILY
Qty: 90 TABLET | Refills: 0 | Status: SHIPPED | OUTPATIENT
Start: 2021-12-21

## 2021-12-21 NOTE — PATIENT INSTRUCTIONS
Start on amlodipine 5 mg daily - monitor for any leg swelling   Monitor blood pressure at home  Send me the readings in 2 weeks with home BP and heart rate   Follow up in 6 months

## 2021-12-21 NOTE — PROGRESS NOTES
Progress Note:      Patient is a 73 yrs old female with pmh of CKD stage III, polycystic kidney disease, HTN x 20 yrs, Hypothyroidism, Asthma who presented for follow up     Started following with Dr. Gabriel Grider in 2018.  Recent Lab test showed BUN/Cr 40/1.4 mg tablet 0   • levothyroxine 50 MCG Oral Tab Take 1 tablet (50 mcg total) by mouth before breakfast. 90 tablet 3   • lisinopril 40 MG Oral Tab Take 1 tablet (40 mg total) by mouth daily.  90 tablet 1   • metoprolol succinate 100 MG Oral Tablet 24 Hr Take 1 ta alert and responsive no acute distress noted  Head/Face: normocephalic  Eyes/Vision: normal extraocular motion is intact  Nose/Mouth/Throat:mucous membranes are moist   Neck/Thyroid: neck is supple   Skin/Hair: no unusual rashes present  Back/Spine: no abn

## 2022-03-02 ENCOUNTER — OFFICE VISIT (OUTPATIENT)
Dept: FAMILY MEDICINE CLINIC | Facility: CLINIC | Age: 72
End: 2022-03-02
Payer: MEDICARE

## 2022-03-02 ENCOUNTER — LAB ENCOUNTER (OUTPATIENT)
Dept: LAB | Age: 72
End: 2022-03-02
Attending: FAMILY MEDICINE
Payer: MEDICARE

## 2022-03-02 VITALS
DIASTOLIC BLOOD PRESSURE: 80 MMHG | BODY MASS INDEX: 32.76 KG/M2 | HEIGHT: 62 IN | TEMPERATURE: 97 F | HEART RATE: 69 BPM | WEIGHT: 178 LBS | SYSTOLIC BLOOD PRESSURE: 130 MMHG

## 2022-03-02 DIAGNOSIS — I10 ESSENTIAL HYPERTENSION: ICD-10-CM

## 2022-03-02 DIAGNOSIS — Z80.41 FH: OVARIAN CANCER IN FIRST DEGREE RELATIVE: ICD-10-CM

## 2022-03-02 DIAGNOSIS — Z80.3 FH: BREAST CANCER IN FIRST DEGREE RELATIVE: ICD-10-CM

## 2022-03-02 DIAGNOSIS — E55.9 VITAMIN D DEFICIENCY: ICD-10-CM

## 2022-03-02 DIAGNOSIS — Z12.31 ENCOUNTER FOR SCREENING MAMMOGRAM FOR BREAST CANCER: ICD-10-CM

## 2022-03-02 DIAGNOSIS — Q61.3 POLYCYSTIC KIDNEY DISEASE: ICD-10-CM

## 2022-03-02 DIAGNOSIS — J45.20 MILD INTERMITTENT REACTIVE AIRWAY DISEASE WITHOUT COMPLICATION: ICD-10-CM

## 2022-03-02 DIAGNOSIS — N18.32 STAGE 3B CHRONIC KIDNEY DISEASE (HCC): ICD-10-CM

## 2022-03-02 DIAGNOSIS — Z01.419 ENCOUNTER FOR GYNECOLOGICAL EXAMINATION: ICD-10-CM

## 2022-03-02 DIAGNOSIS — E03.9 HYPOTHYROIDISM, UNSPECIFIED TYPE: ICD-10-CM

## 2022-03-02 DIAGNOSIS — Z00.00 ENCOUNTER FOR ANNUAL HEALTH EXAMINATION: Primary | ICD-10-CM

## 2022-03-02 DIAGNOSIS — E34.9 ELEVATED PARATHYROID HORMONE: ICD-10-CM

## 2022-03-02 DIAGNOSIS — E66.9 OBESITY (BMI 30.0-34.9): ICD-10-CM

## 2022-03-02 PROBLEM — R79.89 ELEVATED PARATHYROID HORMONE: Status: ACTIVE | Noted: 2022-03-02

## 2022-03-02 LAB
ALBUMIN SERPL-MCNC: 3.9 G/DL (ref 3.4–5)
ALBUMIN/GLOB SERPL: 1.1 {RATIO} (ref 1–2)
ALP LIVER SERPL-CCNC: 92 U/L
ALT SERPL-CCNC: 14 U/L
ANION GAP SERPL CALC-SCNC: 7 MMOL/L (ref 0–18)
AST SERPL-CCNC: 15 U/L (ref 15–37)
BASOPHILS # BLD AUTO: 0.04 X10(3) UL (ref 0–0.2)
BASOPHILS NFR BLD AUTO: 0.6 %
BILIRUB SERPL-MCNC: 0.6 MG/DL (ref 0.1–2)
BUN BLD-MCNC: 35 MG/DL (ref 7–18)
BUN/CREAT SERPL: 21 (ref 10–20)
CALCIUM BLD-MCNC: 9.8 MG/DL (ref 8.5–10.1)
CANCER AG125 SERPL-ACNC: 13.4 U/ML (ref ?–35)
CHLORIDE SERPL-SCNC: 103 MMOL/L (ref 98–112)
CHOLEST SERPL-MCNC: 210 MG/DL (ref ?–200)
CO2 SERPL-SCNC: 27 MMOL/L (ref 21–32)
CREAT BLD-MCNC: 1.67 MG/DL
DEPRECATED RDW RBC AUTO: 43.8 FL (ref 35.1–46.3)
EOSINOPHIL # BLD AUTO: 0.17 X10(3) UL (ref 0–0.7)
ERYTHROCYTE [DISTWIDTH] IN BLOOD BY AUTOMATED COUNT: 13.4 % (ref 11–15)
FASTING PATIENT LIPID ANSWER: YES
FASTING STATUS PATIENT QL REPORTED: YES
GLUCOSE BLD-MCNC: 100 MG/DL (ref 70–99)
HCT VFR BLD AUTO: 38.3 %
HDLC SERPL-MCNC: 55 MG/DL (ref 40–59)
HGB BLD-MCNC: 12.1 G/DL
IMM GRANULOCYTES # BLD AUTO: 0.01 X10(3) UL (ref 0–1)
IMM GRANULOCYTES NFR BLD: 0.1 %
LDLC SERPL CALC-MCNC: 127 MG/DL (ref ?–100)
LYMPHOCYTES # BLD AUTO: 3 X10(3) UL (ref 1–4)
LYMPHOCYTES NFR BLD AUTO: 43 %
MCH RBC QN AUTO: 27.9 PG (ref 26–34)
MCHC RBC AUTO-ENTMCNC: 31.6 G/DL (ref 31–37)
MCV RBC AUTO: 88.2 FL
MONOCYTES # BLD AUTO: 0.3 X10(3) UL (ref 0.1–1)
MONOCYTES NFR BLD AUTO: 4.3 %
NEUTROPHILS # BLD AUTO: 3.45 X10 (3) UL (ref 1.5–7.7)
NEUTROPHILS # BLD AUTO: 3.45 X10(3) UL (ref 1.5–7.7)
NEUTROPHILS NFR BLD AUTO: 49.6 %
NONHDLC SERPL-MCNC: 155 MG/DL (ref ?–130)
OSMOLALITY SERPL CALC.SUM OF ELEC: 292 MOSM/KG (ref 275–295)
PLATELET # BLD AUTO: 158 10(3)UL (ref 150–450)
POTASSIUM SERPL-SCNC: 4.7 MMOL/L (ref 3.5–5.1)
PROT SERPL-MCNC: 7.4 G/DL (ref 6.4–8.2)
RBC # BLD AUTO: 4.34 X10(6)UL
SODIUM SERPL-SCNC: 137 MMOL/L (ref 136–145)
TRIGL SERPL-MCNC: 159 MG/DL (ref 30–149)
TSI SER-ACNC: 1.41 MIU/ML (ref 0.36–3.74)
VIT D+METAB SERPL-MCNC: 44.5 NG/ML (ref 30–100)
VLDLC SERPL CALC-MCNC: 29 MG/DL (ref 0–30)
WBC # BLD AUTO: 7 X10(3) UL (ref 4–11)

## 2022-03-02 PROCEDURE — 36415 COLL VENOUS BLD VENIPUNCTURE: CPT

## 2022-03-02 PROCEDURE — 99397 PER PM REEVAL EST PAT 65+ YR: CPT | Performed by: FAMILY MEDICINE

## 2022-03-02 PROCEDURE — 80053 COMPREHEN METABOLIC PANEL: CPT

## 2022-03-02 PROCEDURE — 3008F BODY MASS INDEX DOCD: CPT | Performed by: FAMILY MEDICINE

## 2022-03-02 PROCEDURE — 3075F SYST BP GE 130 - 139MM HG: CPT | Performed by: FAMILY MEDICINE

## 2022-03-02 PROCEDURE — 80061 LIPID PANEL: CPT

## 2022-03-02 PROCEDURE — 82306 VITAMIN D 25 HYDROXY: CPT

## 2022-03-02 PROCEDURE — 85025 COMPLETE CBC W/AUTO DIFF WBC: CPT

## 2022-03-02 PROCEDURE — 86304 IMMUNOASSAY TUMOR CA 125: CPT

## 2022-03-02 PROCEDURE — 3079F DIAST BP 80-89 MM HG: CPT | Performed by: FAMILY MEDICINE

## 2022-03-02 PROCEDURE — G0439 PPPS, SUBSEQ VISIT: HCPCS | Performed by: FAMILY MEDICINE

## 2022-03-02 PROCEDURE — 96160 PT-FOCUSED HLTH RISK ASSMT: CPT | Performed by: FAMILY MEDICINE

## 2022-03-02 PROCEDURE — 84443 ASSAY THYROID STIM HORMONE: CPT

## 2022-03-02 RX ORDER — ALBUTEROL SULFATE 90 UG/1
2 AEROSOL, METERED RESPIRATORY (INHALATION) EVERY 6 HOURS PRN
Qty: 1 EACH | Refills: 3 | Status: SHIPPED | OUTPATIENT
Start: 2022-03-02

## 2022-03-09 DIAGNOSIS — I10 ESSENTIAL HYPERTENSION: ICD-10-CM

## 2022-03-09 PROBLEM — E78.00 HYPERCHOLESTEREMIA: Status: ACTIVE | Noted: 2022-03-09

## 2022-03-09 RX ORDER — ATORVASTATIN CALCIUM 10 MG/1
10 TABLET, FILM COATED ORAL DAILY
Qty: 90 TABLET | Refills: 1 | Status: SHIPPED | OUTPATIENT
Start: 2022-03-09 | End: 2023-03-04

## 2022-03-09 RX ORDER — LISINOPRIL 40 MG/1
40 TABLET ORAL DAILY
Qty: 90 TABLET | Refills: 3 | Status: SHIPPED | OUTPATIENT
Start: 2022-03-09 | End: 2023-03-05

## 2022-03-09 RX ORDER — METOPROLOL SUCCINATE 100 MG/1
100 TABLET, EXTENDED RELEASE ORAL DAILY
Qty: 90 TABLET | Refills: 3 | Status: SHIPPED | OUTPATIENT
Start: 2022-03-09 | End: 2022-07-21

## 2022-03-09 NOTE — TELEPHONE ENCOUNTER
Please review refill protocol failed/ no protocol  Requested Prescriptions   Pending Prescriptions Disp Refills    METOPROLOL SUCCINATE 100 MG Oral Tablet 24 Hr [Pharmacy Med Name: Metoprolol Succinate  MG Oral Tablet Extended Release 24 Hour] 90 tablet 0     Sig: Take 1 tablet by mouth once daily        Hypertensive Medications Protocol Failed - 3/9/2022 10:00 AM        Failed - GFR Non- > 50     Lab Results   Component Value Date    GFRNAA 31 (L) 03/02/2022                 Passed - CMP or BMP in past 12 months        Passed - Appointment in past 6 or next 3 months           LISINOPRIL 40 MG Oral Tab [Pharmacy Med Name: Lisinopril 40 MG Oral Tablet] 90 tablet 0     Sig: Take 1 tablet by mouth once daily        Hypertensive Medications Protocol Failed - 3/9/2022 10:00 AM        Failed - GFR Non- > 50     Lab Results   Component Value Date    GFRNAA 31 (L) 03/02/2022                 Passed - CMP or BMP in past 12 months        Passed - Appointment in past 6 or next 3 months

## 2022-03-11 ENCOUNTER — TELEPHONE (OUTPATIENT)
Dept: HEMATOLOGY/ONCOLOGY | Facility: HOSPITAL | Age: 72
End: 2022-03-11

## 2022-03-11 NOTE — TELEPHONE ENCOUNTER
I spoke to patient. She has not reached out to her insurance company yet. She will call to schedule an appointment after she calls.

## 2022-03-16 ENCOUNTER — TELEPHONE (OUTPATIENT)
Dept: HEMATOLOGY/ONCOLOGY | Facility: HOSPITAL | Age: 72
End: 2022-03-16

## 2022-03-22 ENCOUNTER — TELEPHONE (OUTPATIENT)
Dept: HEMATOLOGY/ONCOLOGY | Facility: HOSPITAL | Age: 72
End: 2022-03-22

## 2022-05-02 ENCOUNTER — HOSPITAL ENCOUNTER (OUTPATIENT)
Dept: MAMMOGRAPHY | Age: 72
Discharge: HOME | End: 2022-05-02
Attending: FAMILY MEDICINE
Payer: MEDICARE

## 2022-05-02 DIAGNOSIS — Z12.31 ENCOUNTER FOR SCREENING MAMMOGRAM FOR BREAST CANCER: ICD-10-CM

## 2022-05-02 PROCEDURE — 77067 SCR MAMMO BI INCL CAD: CPT | Performed by: FAMILY MEDICINE

## 2022-05-02 PROCEDURE — 77063 BREAST TOMOSYNTHESIS BI: CPT | Performed by: FAMILY MEDICINE

## 2022-06-21 ENCOUNTER — OFFICE VISIT (OUTPATIENT)
Dept: NEPHROLOGY | Facility: CLINIC | Age: 72
End: 2022-06-21
Payer: MEDICARE

## 2022-06-21 VITALS
SYSTOLIC BLOOD PRESSURE: 135 MMHG | WEIGHT: 173.5 LBS | DIASTOLIC BLOOD PRESSURE: 80 MMHG | BODY MASS INDEX: 32 KG/M2 | HEART RATE: 76 BPM

## 2022-06-21 DIAGNOSIS — Q61.3 POLYCYSTIC KIDNEY DISEASE: ICD-10-CM

## 2022-06-21 DIAGNOSIS — N18.31 STAGE 3A CHRONIC KIDNEY DISEASE (HCC): Primary | ICD-10-CM

## 2022-06-21 PROCEDURE — 1126F AMNT PAIN NOTED NONE PRSNT: CPT | Performed by: INTERNAL MEDICINE

## 2022-06-21 PROCEDURE — 99214 OFFICE O/P EST MOD 30 MIN: CPT | Performed by: INTERNAL MEDICINE

## 2022-06-21 PROCEDURE — 3075F SYST BP GE 130 - 139MM HG: CPT | Performed by: INTERNAL MEDICINE

## 2022-06-21 PROCEDURE — 3079F DIAST BP 80-89 MM HG: CPT | Performed by: INTERNAL MEDICINE

## 2022-07-06 ENCOUNTER — LAB ENCOUNTER (OUTPATIENT)
Dept: LAB | Age: 72
End: 2022-07-06
Attending: INTERNAL MEDICINE
Payer: MEDICARE

## 2022-07-06 DIAGNOSIS — Q61.3 POLYCYSTIC KIDNEY DISEASE: ICD-10-CM

## 2022-07-06 DIAGNOSIS — N18.31 STAGE 3A CHRONIC KIDNEY DISEASE (HCC): ICD-10-CM

## 2022-07-06 LAB
ANION GAP SERPL CALC-SCNC: 6 MMOL/L (ref 0–18)
BILIRUB UR QL: NEGATIVE
BUN BLD-MCNC: 30 MG/DL (ref 7–18)
BUN/CREAT SERPL: 19.4 (ref 10–20)
CALCIUM BLD-MCNC: 9.8 MG/DL (ref 8.5–10.1)
CHLORIDE SERPL-SCNC: 107 MMOL/L (ref 98–112)
CHOLEST SERPL-MCNC: 115 MG/DL (ref ?–200)
CLARITY UR: CLEAR
CO2 SERPL-SCNC: 26 MMOL/L (ref 21–32)
COLOR UR: YELLOW
CREAT BLD-MCNC: 1.55 MG/DL
CREAT UR-SCNC: 81.8 MG/DL
FASTING PATIENT LIPID ANSWER: YES
FASTING STATUS PATIENT QL REPORTED: YES
GLUCOSE BLD-MCNC: 95 MG/DL (ref 70–99)
GLUCOSE UR-MCNC: NEGATIVE MG/DL
HDLC SERPL-MCNC: 59 MG/DL (ref 40–59)
HGB UR QL STRIP.AUTO: NEGATIVE
KETONES UR-MCNC: NEGATIVE MG/DL
LDLC SERPL CALC-MCNC: 36 MG/DL (ref ?–100)
MICROALBUMIN UR-MCNC: 1.14 MG/DL
MICROALBUMIN/CREAT 24H UR-RTO: 13.9 UG/MG (ref ?–30)
NITRITE UR QL STRIP.AUTO: NEGATIVE
NONHDLC SERPL-MCNC: 56 MG/DL (ref ?–130)
OSMOLALITY SERPL CALC.SUM OF ELEC: 294 MOSM/KG (ref 275–295)
PH UR: 6 [PH] (ref 5–8)
POTASSIUM SERPL-SCNC: 4.9 MMOL/L (ref 3.5–5.1)
PROT UR-MCNC: NEGATIVE MG/DL
PTH-INTACT SERPL-MCNC: 123.2 PG/ML (ref 18.5–88)
SODIUM SERPL-SCNC: 139 MMOL/L (ref 136–145)
SP GR UR STRIP: 1.01 (ref 1–1.03)
TRIGL SERPL-MCNC: 112 MG/DL (ref 30–149)
UROBILINOGEN UR STRIP-ACNC: 0.2
VLDLC SERPL CALC-MCNC: 15 MG/DL (ref 0–30)

## 2022-07-06 PROCEDURE — 36415 COLL VENOUS BLD VENIPUNCTURE: CPT

## 2022-07-06 PROCEDURE — 80061 LIPID PANEL: CPT

## 2022-07-06 PROCEDURE — 82043 UR ALBUMIN QUANTITATIVE: CPT

## 2022-07-06 PROCEDURE — 81001 URINALYSIS AUTO W/SCOPE: CPT

## 2022-07-06 PROCEDURE — 80048 BASIC METABOLIC PNL TOTAL CA: CPT

## 2022-07-06 PROCEDURE — 83970 ASSAY OF PARATHORMONE: CPT

## 2022-07-06 PROCEDURE — 82570 ASSAY OF URINE CREATININE: CPT

## 2022-07-21 ENCOUNTER — OFFICE VISIT (OUTPATIENT)
Dept: FAMILY MEDICINE CLINIC | Facility: CLINIC | Age: 72
End: 2022-07-21
Payer: MEDICARE

## 2022-07-21 VITALS
WEIGHT: 170 LBS | TEMPERATURE: 98 F | DIASTOLIC BLOOD PRESSURE: 67 MMHG | SYSTOLIC BLOOD PRESSURE: 138 MMHG | BODY MASS INDEX: 31.28 KG/M2 | HEART RATE: 79 BPM | HEIGHT: 62 IN

## 2022-07-21 DIAGNOSIS — I10 ESSENTIAL HYPERTENSION: Primary | ICD-10-CM

## 2022-07-21 DIAGNOSIS — Q61.3 PKD (POLYCYSTIC KIDNEY DISEASE): ICD-10-CM

## 2022-07-21 DIAGNOSIS — E03.9 HYPOTHYROIDISM, UNSPECIFIED TYPE: ICD-10-CM

## 2022-07-21 DIAGNOSIS — E78.00 HYPERCHOLESTEREMIA: ICD-10-CM

## 2022-07-21 DIAGNOSIS — N18.30 STAGE 3 CHRONIC KIDNEY DISEASE, UNSPECIFIED WHETHER STAGE 3A OR 3B CKD (HCC): ICD-10-CM

## 2022-07-21 PROCEDURE — 3078F DIAST BP <80 MM HG: CPT | Performed by: FAMILY MEDICINE

## 2022-07-21 PROCEDURE — 99214 OFFICE O/P EST MOD 30 MIN: CPT | Performed by: FAMILY MEDICINE

## 2022-07-21 PROCEDURE — 3075F SYST BP GE 130 - 139MM HG: CPT | Performed by: FAMILY MEDICINE

## 2022-07-21 PROCEDURE — 3008F BODY MASS INDEX DOCD: CPT | Performed by: FAMILY MEDICINE

## 2022-07-21 RX ORDER — AMLODIPINE BESYLATE 5 MG/1
5 TABLET ORAL DAILY
Qty: 90 TABLET | Refills: 3 | Status: SHIPPED | OUTPATIENT
Start: 2022-07-21 | End: 2022-07-21

## 2022-07-21 RX ORDER — METOPROLOL SUCCINATE 50 MG/1
50 TABLET, EXTENDED RELEASE ORAL DAILY
COMMUNITY

## 2022-07-21 RX ORDER — LEVOTHYROXINE SODIUM 0.05 MG/1
50 TABLET ORAL
Qty: 90 TABLET | Refills: 3 | Status: SHIPPED | OUTPATIENT
Start: 2022-07-21

## 2022-07-21 RX ORDER — ATORVASTATIN CALCIUM 10 MG/1
5 TABLET, FILM COATED ORAL DAILY
Qty: 45 TABLET | Refills: 3 | Status: SHIPPED | OUTPATIENT
Start: 2022-07-21 | End: 2022-10-19

## 2022-09-27 DIAGNOSIS — E78.00 HYPERCHOLESTEREMIA: ICD-10-CM

## 2022-09-27 RX ORDER — ATORVASTATIN CALCIUM 10 MG/1
10 TABLET, FILM COATED ORAL DAILY
Qty: 90 TABLET | Refills: 1 | Status: SHIPPED | OUTPATIENT
Start: 2022-09-27 | End: 2023-03-25

## 2022-09-27 NOTE — TELEPHONE ENCOUNTER
Refill passed per [facility] protocol.     Requested Prescriptions   Pending Prescriptions Disp Refills    ATORVASTATIN 10 MG Oral Tab [Pharmacy Med Name: Atorvastatin Calcium 10 MG Oral Tablet] 90 tablet 0     Sig: Take 1 tablet by mouth once daily        Cholesterol Medication Protocol Passed - 9/27/2022 10:54 AM        Passed - ALT in past 12 months        Passed - LDL in past 12 months        Passed - Last ALT < 80       Lab Results   Component Value Date    ALT 14 03/02/2022             Passed - Last LDL < 130     Lab Results   Component Value Date    LDL 36 07/06/2022               Passed - In person appointment or virtual visit in the past 12 mos or appointment in next 3 mos       Recent Outpatient Visits              2 months ago Essential hypertension    Toma Hernandez MD    Office Visit    3 months ago Stage 3a chronic kidney disease [facility])    Brielle Manzo, Salty Lara MD    Office Visit    6 months ago Encounter for annual health examination    Toma Hernandez MD    Office Visit    9 months ago Stage 3a chronic kidney disease [facility])    Herberth Kam MD    Office Visit    9 months ago Essential hypertension    Jaycee Carroll MD    Office Visit     Future Appointments         Provider Department Appt Notes    In 5 months Fabiola Lucio MD [facility], [address] [place] [address]                     Recent Outpatient Visits              2 months ago Essential hypertension    Jaycee Carroll MD    Office Visit    3 months ago Stage 3a chronic kidney disease [facility])    Leonardo Manzo Waymond Grice, MD    Office Visit    6 months ago Encounter for annual health examination    [place] [facility], [facility] Carrie Aguero MD    Office Visit    9 months ago Stage 3a chronic kidney disease [facility])    [address] [facility], Rcia Hannah MD    Office Visit    9 months ago Essential hypertension    Ruddy Rodriguez MD    Office Visit            Future Appointments         Provider Department Appt Notes    In 5 months Carrie Aguero MD [facility], [facility], [address] [facility]

## 2022-12-11 ENCOUNTER — IMMUNIZATION (OUTPATIENT)
Dept: LAB | Age: 72
End: 2022-12-11
Attending: EMERGENCY MEDICINE
Payer: MEDICARE

## 2022-12-11 DIAGNOSIS — Z23 NEED FOR VACCINATION: Primary | ICD-10-CM

## 2022-12-11 PROCEDURE — 0124A SARSCOV2 VAC BVL 30MCG/0.3ML: CPT

## 2023-01-12 ENCOUNTER — OFFICE VISIT (OUTPATIENT)
Dept: NEPHROLOGY | Facility: CLINIC | Age: 73
End: 2023-01-12

## 2023-01-12 VITALS
HEIGHT: 62 IN | SYSTOLIC BLOOD PRESSURE: 162 MMHG | DIASTOLIC BLOOD PRESSURE: 72 MMHG | HEART RATE: 71 BPM | BODY MASS INDEX: 30.18 KG/M2 | WEIGHT: 164 LBS

## 2023-01-12 DIAGNOSIS — N18.31 STAGE 3A CHRONIC KIDNEY DISEASE (HCC): Primary | ICD-10-CM

## 2023-01-12 DIAGNOSIS — Q61.3 POLYCYSTIC KIDNEY DISEASE: ICD-10-CM

## 2023-01-12 DIAGNOSIS — I10 ESSENTIAL HYPERTENSION: ICD-10-CM

## 2023-01-12 PROCEDURE — 3078F DIAST BP <80 MM HG: CPT | Performed by: INTERNAL MEDICINE

## 2023-01-12 PROCEDURE — 3077F SYST BP >= 140 MM HG: CPT | Performed by: INTERNAL MEDICINE

## 2023-01-12 PROCEDURE — 3008F BODY MASS INDEX DOCD: CPT | Performed by: INTERNAL MEDICINE

## 2023-01-12 PROCEDURE — 99214 OFFICE O/P EST MOD 30 MIN: CPT | Performed by: INTERNAL MEDICINE

## 2023-01-12 NOTE — PATIENT INSTRUCTIONS
Lab tests per Dr. Khalida Qureshi  Follow up in 6 months    increase metoprolol to 100 mg daily dose   Monitor blood pressure at home and send readings in 2 weeks  Calibrate your blood pressure monitor

## 2023-03-21 ENCOUNTER — OFFICE VISIT (OUTPATIENT)
Dept: FAMILY MEDICINE CLINIC | Facility: CLINIC | Age: 73
End: 2023-03-21

## 2023-03-21 ENCOUNTER — LAB ENCOUNTER (OUTPATIENT)
Dept: LAB | Age: 73
End: 2023-03-21
Attending: FAMILY MEDICINE
Payer: MEDICARE

## 2023-03-21 VITALS
SYSTOLIC BLOOD PRESSURE: 138 MMHG | DIASTOLIC BLOOD PRESSURE: 80 MMHG | OXYGEN SATURATION: 99 % | HEIGHT: 62 IN | WEIGHT: 165 LBS | BODY MASS INDEX: 30.36 KG/M2 | HEART RATE: 70 BPM

## 2023-03-21 DIAGNOSIS — I10 ESSENTIAL HYPERTENSION: ICD-10-CM

## 2023-03-21 DIAGNOSIS — E55.9 VITAMIN D DEFICIENCY: ICD-10-CM

## 2023-03-21 DIAGNOSIS — E03.9 HYPOTHYROIDISM, UNSPECIFIED TYPE: ICD-10-CM

## 2023-03-21 DIAGNOSIS — N18.30 STAGE 3 CHRONIC KIDNEY DISEASE, UNSPECIFIED WHETHER STAGE 3A OR 3B CKD (HCC): ICD-10-CM

## 2023-03-21 DIAGNOSIS — E78.00 HYPERCHOLESTEREMIA: ICD-10-CM

## 2023-03-21 DIAGNOSIS — E34.9 ELEVATED PARATHYROID HORMONE: ICD-10-CM

## 2023-03-21 DIAGNOSIS — J45.20 MILD INTERMITTENT REACTIVE AIRWAY DISEASE WITHOUT COMPLICATION: ICD-10-CM

## 2023-03-21 DIAGNOSIS — E66.9 OBESITY (BMI 30.0-34.9): ICD-10-CM

## 2023-03-21 DIAGNOSIS — Z12.31 ENCOUNTER FOR SCREENING MAMMOGRAM FOR BREAST CANCER: ICD-10-CM

## 2023-03-21 DIAGNOSIS — Q61.3 POLYCYSTIC KIDNEY DISEASE: ICD-10-CM

## 2023-03-21 DIAGNOSIS — Z00.00 ENCOUNTER FOR ANNUAL HEALTH EXAMINATION: Primary | ICD-10-CM

## 2023-03-21 LAB
ALBUMIN SERPL-MCNC: 3.5 G/DL (ref 3.4–5)
ALT SERPL-CCNC: 16 U/L
ANION GAP SERPL CALC-SCNC: 7 MMOL/L (ref 0–18)
AST SERPL-CCNC: 20 U/L (ref 15–37)
BUN BLD-MCNC: 36 MG/DL (ref 7–18)
BUN/CREAT SERPL: 22 (ref 10–20)
CALCIUM BLD-MCNC: 9.2 MG/DL (ref 8.5–10.1)
CHLORIDE SERPL-SCNC: 110 MMOL/L (ref 98–112)
CHOLEST SERPL-MCNC: 127 MG/DL (ref ?–200)
CO2 SERPL-SCNC: 26 MMOL/L (ref 21–32)
CREAT BLD-MCNC: 1.64 MG/DL
FASTING PATIENT LIPID ANSWER: YES
GFR SERPLBLD BASED ON 1.73 SQ M-ARVRAT: 33 ML/MIN/1.73M2 (ref 60–?)
GLUCOSE BLD-MCNC: 107 MG/DL (ref 70–99)
HDLC SERPL-MCNC: 63 MG/DL (ref 40–59)
LDLC SERPL CALC-MCNC: 41 MG/DL (ref ?–100)
NONHDLC SERPL-MCNC: 64 MG/DL (ref ?–130)
OSMOLALITY SERPL CALC.SUM OF ELEC: 305 MOSM/KG (ref 275–295)
PHOSPHATE SERPL-MCNC: 3.1 MG/DL (ref 2.5–4.9)
POTASSIUM SERPL-SCNC: 4.6 MMOL/L (ref 3.5–5.1)
PTH-INTACT SERPL-MCNC: 144.1 PG/ML (ref 18.5–88)
SODIUM SERPL-SCNC: 143 MMOL/L (ref 136–145)
TRIGL SERPL-MCNC: 135 MG/DL (ref 30–149)
TSI SER-ACNC: 2.9 MIU/ML (ref 0.36–3.74)
VIT D+METAB SERPL-MCNC: 60 NG/ML (ref 30–100)
VLDLC SERPL CALC-MCNC: 19 MG/DL (ref 0–30)

## 2023-03-21 PROCEDURE — 82306 VITAMIN D 25 HYDROXY: CPT

## 2023-03-21 PROCEDURE — 84460 ALANINE AMINO (ALT) (SGPT): CPT

## 2023-03-21 PROCEDURE — 84450 TRANSFERASE (AST) (SGOT): CPT

## 2023-03-21 PROCEDURE — 36415 COLL VENOUS BLD VENIPUNCTURE: CPT

## 2023-03-21 PROCEDURE — 83970 ASSAY OF PARATHORMONE: CPT

## 2023-03-21 PROCEDURE — 80069 RENAL FUNCTION PANEL: CPT

## 2023-03-21 PROCEDURE — 84443 ASSAY THYROID STIM HORMONE: CPT

## 2023-03-21 PROCEDURE — 80061 LIPID PANEL: CPT

## 2023-03-25 DIAGNOSIS — E03.9 HYPOTHYROIDISM, UNSPECIFIED TYPE: ICD-10-CM

## 2023-03-25 DIAGNOSIS — E78.00 HYPERCHOLESTEREMIA: ICD-10-CM

## 2023-03-25 DIAGNOSIS — I10 ESSENTIAL HYPERTENSION: ICD-10-CM

## 2023-03-25 RX ORDER — LISINOPRIL 40 MG/1
40 TABLET ORAL DAILY
Qty: 90 TABLET | Refills: 3 | Status: SHIPPED | OUTPATIENT
Start: 2023-03-25

## 2023-03-25 RX ORDER — LEVOTHYROXINE SODIUM 0.05 MG/1
50 TABLET ORAL
Qty: 90 TABLET | Refills: 3 | Status: SHIPPED | OUTPATIENT
Start: 2023-03-25

## 2023-03-25 RX ORDER — ATORVASTATIN CALCIUM 10 MG/1
10 TABLET, FILM COATED ORAL DAILY
Qty: 90 TABLET | Refills: 3 | Status: SHIPPED | OUTPATIENT
Start: 2023-03-25

## 2023-05-22 ENCOUNTER — HOSPITAL ENCOUNTER (OUTPATIENT)
Dept: MAMMOGRAPHY | Age: 73
Discharge: HOME | End: 2023-05-22
Attending: FAMILY MEDICINE
Payer: MEDICARE

## 2023-05-22 DIAGNOSIS — Z12.31 ENCOUNTER FOR SCREENING MAMMOGRAM FOR BREAST CANCER: ICD-10-CM

## 2023-05-22 PROCEDURE — 77067 SCR MAMMO BI INCL CAD: CPT | Performed by: FAMILY MEDICINE

## 2023-05-22 PROCEDURE — 77063 BREAST TOMOSYNTHESIS BI: CPT | Performed by: FAMILY MEDICINE

## 2023-08-22 NOTE — PATIENT INSTRUCTIONS
Dizziness      Get your blood work done   Get EKG done     Take Lisinopril 40 mg in the morning   Take Metoprolol 100 mg ER at night     STOP the Metoprolol 50 mg ER     Return in 2 weeks with BP readings to see Dr. Summer Kowalski. Name band;

## 2023-08-23 ENCOUNTER — OFFICE VISIT (OUTPATIENT)
Dept: NEPHROLOGY | Facility: CLINIC | Age: 73
End: 2023-08-23

## 2023-08-23 VITALS
HEIGHT: 62 IN | SYSTOLIC BLOOD PRESSURE: 152 MMHG | BODY MASS INDEX: 30.55 KG/M2 | HEART RATE: 69 BPM | WEIGHT: 166 LBS | DIASTOLIC BLOOD PRESSURE: 61 MMHG

## 2023-08-23 DIAGNOSIS — I10 ESSENTIAL HYPERTENSION: ICD-10-CM

## 2023-08-23 DIAGNOSIS — N18.31 STAGE 3A CHRONIC KIDNEY DISEASE (HCC): Primary | ICD-10-CM

## 2023-08-23 DIAGNOSIS — Q61.3 POLYCYSTIC KIDNEY DISEASE: ICD-10-CM

## 2023-08-23 PROCEDURE — 1159F MED LIST DOCD IN RCRD: CPT | Performed by: INTERNAL MEDICINE

## 2023-08-23 PROCEDURE — 3008F BODY MASS INDEX DOCD: CPT | Performed by: INTERNAL MEDICINE

## 2023-08-23 PROCEDURE — 3077F SYST BP >= 140 MM HG: CPT | Performed by: INTERNAL MEDICINE

## 2023-08-23 PROCEDURE — 3078F DIAST BP <80 MM HG: CPT | Performed by: INTERNAL MEDICINE

## 2023-08-23 PROCEDURE — 99214 OFFICE O/P EST MOD 30 MIN: CPT | Performed by: INTERNAL MEDICINE

## 2023-08-23 NOTE — PROGRESS NOTES
Progress Note:      Patient is a 81 yrs old female with pmh of CKD stage III, polycystic kidney disease, HTN x 20 yrs, Hypothyroidism, Asthma who presented for follow up     Started following with Dr. Ginny Alfaro in 2018. Recent Lab test showed BUN/Cr 40/1.4 mg/dl with an eGFR 39 ml/min. Albumin and calcium wnl. TSH wnl. Creatinine from 2018 1.37 mg/dl with an eGFR 41 ml/min. Hgb 11.5 g/dl    Both parents .  at age of 79 and [de-identified]. No known kidney disease in parents. One sister and 2 brother  of cancer and CAD respectively. Other siblings have cancer , alzheimers but no known kidney disease. Patient has 3 kids - 43 yrs and 55 yrs daughter. And 36 yrs old son with no known kidney issues     Takes OTC NSAIDs once in great while. No herbal supplement. No urinary complaints    Non smoker, no alcohol. No FH of kidney disease. Drink plenty of water. Retired recently. No new complaints    Drinking 65-75 ounces of liquids. No leg swelling or urinary complaints. No leg swelling or urinary complaints. Forgot to bring home BP readings. Has white coat HTN. Readings are mostly at home in 120-130s range. HISTORY:  Past Medical History:   Diagnosis Date    Allergic rhinitis     Asthma     Essential hypertension     Hypothyroidism       Past Surgical History:   Procedure Laterality Date    COLPOSCOPY, CERVIX W/UPPER ADJACENT VAGINA; W/BIOPSY(S), CERVIX            3    TUBAL LIGATION        Family History   Problem Relation Age of Onset    Hypertension Father     Hypertension Mother     Heart Disorder Mother     Ovarian Cancer Sister 54    Breast Cancer Sister 62    Diabetes Neg       Social History:   Social History     Socioeconomic History    Marital status:     Tobacco Use    Smoking status: Never    Smokeless tobacco: Never   Vaping Use    Vaping Use: Never used   Substance and Sexual Activity    Alcohol use: No    Drug use: No        Medications (Active prior to today's visit):  Current Outpatient Medications   Medication Sig Dispense Refill    lisinopril 40 MG Oral Tab Take 1 tablet (40 mg total) by mouth daily. 90 tablet 3    atorvastatin 10 MG Oral Tab Take 1 tablet (10 mg total) by mouth daily. 90 tablet 3    levothyroxine 50 MCG Oral Tab Take 1 tablet (50 mcg total) by mouth before breakfast. 90 tablet 3    metoprolol succinate ER 50 MG Oral Tablet 24 Hr Take 1 tablet (50 mg total) by mouth daily. albuterol (PROAIR HFA) 108 (90 Base) MCG/ACT Inhalation Aero Soln Inhale 2 puffs into the lungs every 6 (six) hours as needed for Wheezing.  1 each 3       Allergies:    Seasonal                      ROS:     Constitutional:  Negative for decreased activity, fever, irritability and lethargy  ENMT:  Negative for ear drainage, hearing loss and nasal drainage  Eyes:  Negative for eye discharge and vision loss  Cardiovascular:  Negative for chest pain, sobs  Respiratory:  Negative for cough, dyspnea and wheezing  Gastrointestinal:  Negative for abdominal pain, constipation  Genitourinary:  Negative for dysuria and hematuria  Endocrine:  Negative for abnormal sleep patterns, increased activity  Hema/Lymph:  Negative for easy bleeding and easy bruising  Integumentary:  Negative for pruritus and rash  Musculoskeletal:  Negative for bone/joint symptoms  Neurological:  Negative for gait disturbance  Psychiatric:  Negative for inappropriate interaction and psychiatric symptoms       08/23/23  0848   BP: 152/61   Pulse: 69     Wt Readings from Last 6 Encounters:  08/23/23 : 166 lb (75.3 kg)  03/21/23 : 165 lb (74.8 kg)  01/12/23 : 164 lb (74.4 kg)  07/21/22 : 170 lb (77.1 kg)  06/21/22 : 173 lb 8 oz (78.7 kg)  03/02/22 : 178 lb (80.7 kg)        PHYSICAL EXAM:   Constitutional: appears well hydrated alert and responsive no acute distress noted  Head/Face: normocephalic  Eyes/Vision: normal extraocular motion is intact  Nose/Mouth/Throat:mucous membranes are moist   Neck/Thyroid: neck is supple   Skin/Hair: no unusual rashes present  Back/Spine: no abnormalities noted  Musculoskeletal:  no deformities  Extremities: no edema  Neurological:  Grossly normal       ASSESSMENT/PLAN:     1. PKD: CKD stage III:   - renal US concern of polycystic kidney disease - no known FH of CKD or RRT   - Creatinine in 2/2018 was1.37 mg/dl with an eGFR 41 ml/min. stable around 1.5-1.6 mg/dl with an eGFR 32-36 ml/min - repeat pending   - state son might be interested in donating the kidney when needed   - UA, urine albumin and protein unremarkable    - avoid nephrotoxins   - renal US showed enlarged kidneys - right 17 cm and left 18 cm with bilateral innumerable cysts concern for polycystic kidney disease   - defer jynarque given unlikely to be familial origin and also copay very high   - counseled to have her children do urine, blood and US testing for PKD   - PTH mildly up at 123 - vitamin D wnl- on daily vitamin D3 at 2000 units. 2. HTN:   - home BP in 120-130s   - on lisinopril 40 mg and metoprolol 100 mg  - state was on metoprolol at 150 mg and made her dizzy   - Na in normal range   - White coat hypertension     Follow up in 6 months.      Lab tests in September        Orders This Visit:  Orders Placed This Encounter      Renal Function Panel      Microalb/Creat Ratio, Random Urine      Urinalysis, Routine      Meds This Visit:  Requested Prescriptions      No prescriptions requested or ordered in this encounter       Imaging & Referrals:  None     8/23/2023  Yaquelin Rolle MD

## 2023-08-23 NOTE — PATIENT INSTRUCTIONS
Take vitamin D3 at 2000 units at home   Lab tests with other tests in September   Follow up in 6 months

## 2023-09-18 ENCOUNTER — LAB ENCOUNTER (OUTPATIENT)
Dept: LAB | Age: 73
End: 2023-09-18
Attending: FAMILY MEDICINE
Payer: MEDICARE

## 2023-09-18 ENCOUNTER — OFFICE VISIT (OUTPATIENT)
Dept: FAMILY MEDICINE CLINIC | Facility: CLINIC | Age: 73
End: 2023-09-18

## 2023-09-18 VITALS
BODY MASS INDEX: 30.36 KG/M2 | HEIGHT: 62 IN | HEART RATE: 71 BPM | SYSTOLIC BLOOD PRESSURE: 138 MMHG | DIASTOLIC BLOOD PRESSURE: 80 MMHG | WEIGHT: 165 LBS

## 2023-09-18 DIAGNOSIS — R79.89 ELEVATED PARATHYROID HORMONE: ICD-10-CM

## 2023-09-18 DIAGNOSIS — E03.9 HYPOTHYROIDISM, UNSPECIFIED TYPE: ICD-10-CM

## 2023-09-18 DIAGNOSIS — E78.00 HYPERCHOLESTEREMIA: ICD-10-CM

## 2023-09-18 DIAGNOSIS — Z12.11 COLON CANCER SCREENING: ICD-10-CM

## 2023-09-18 DIAGNOSIS — N18.31 STAGE 3A CHRONIC KIDNEY DISEASE (HCC): ICD-10-CM

## 2023-09-18 DIAGNOSIS — I10 ESSENTIAL HYPERTENSION: Primary | ICD-10-CM

## 2023-09-18 DIAGNOSIS — Q61.3 POLYCYSTIC KIDNEY DISEASE: ICD-10-CM

## 2023-09-18 DIAGNOSIS — J45.20 MILD INTERMITTENT REACTIVE AIRWAY DISEASE WITHOUT COMPLICATION: ICD-10-CM

## 2023-09-18 LAB
ALBUMIN SERPL-MCNC: 3.6 G/DL (ref 3.4–5)
ALT SERPL-CCNC: 21 U/L
ANION GAP SERPL CALC-SCNC: 5 MMOL/L (ref 0–18)
AST SERPL-CCNC: 18 U/L (ref 15–37)
BILIRUB UR QL: NEGATIVE
BUN BLD-MCNC: 32 MG/DL (ref 7–18)
BUN/CREAT SERPL: 21.9 (ref 10–20)
CALCIUM BLD-MCNC: 10 MG/DL (ref 8.5–10.1)
CHLORIDE SERPL-SCNC: 111 MMOL/L (ref 98–112)
CHOLEST SERPL-MCNC: 129 MG/DL (ref ?–200)
CLARITY UR: CLEAR
CO2 SERPL-SCNC: 24 MMOL/L (ref 21–32)
COLOR UR: COLORLESS
CREAT BLD-MCNC: 1.46 MG/DL
CREAT UR-SCNC: 75.4 MG/DL
EGFRCR SERPLBLD CKD-EPI 2021: 38 ML/MIN/1.73M2 (ref 60–?)
FASTING PATIENT LIPID ANSWER: YES
GLUCOSE BLD-MCNC: 111 MG/DL (ref 70–99)
GLUCOSE UR-MCNC: NORMAL MG/DL
HDLC SERPL-MCNC: 60 MG/DL (ref 40–59)
HGB UR QL STRIP.AUTO: NEGATIVE
KETONES UR-MCNC: NEGATIVE MG/DL
LDLC SERPL CALC-MCNC: 49 MG/DL (ref ?–100)
LEUKOCYTE ESTERASE UR QL STRIP.AUTO: NEGATIVE
MICROALBUMIN UR-MCNC: 2.38 MG/DL
MICROALBUMIN/CREAT 24H UR-RTO: 31.6 UG/MG (ref ?–30)
NITRITE UR QL STRIP.AUTO: NEGATIVE
NONHDLC SERPL-MCNC: 69 MG/DL (ref ?–130)
OSMOLALITY SERPL CALC.SUM OF ELEC: 298 MOSM/KG (ref 275–295)
PH UR: 5.5 [PH] (ref 5–8)
PHOSPHATE SERPL-MCNC: 3 MG/DL (ref 2.5–4.9)
POTASSIUM SERPL-SCNC: 4.6 MMOL/L (ref 3.5–5.1)
PROT UR-MCNC: NEGATIVE MG/DL
PTH-INTACT SERPL-MCNC: 134.6 PG/ML (ref 18.5–88)
SODIUM SERPL-SCNC: 140 MMOL/L (ref 136–145)
SP GR UR STRIP: 1.01 (ref 1–1.03)
TRIGL SERPL-MCNC: 112 MG/DL (ref 30–149)
TSI SER-ACNC: 1.26 MIU/ML (ref 0.36–3.74)
UROBILINOGEN UR STRIP-ACNC: NORMAL
VIT D+METAB SERPL-MCNC: 47 NG/ML (ref 30–100)
VLDLC SERPL CALC-MCNC: 16 MG/DL (ref 0–30)

## 2023-09-18 PROCEDURE — 84460 ALANINE AMINO (ALT) (SGPT): CPT

## 2023-09-18 PROCEDURE — 1159F MED LIST DOCD IN RCRD: CPT | Performed by: FAMILY MEDICINE

## 2023-09-18 PROCEDURE — 3008F BODY MASS INDEX DOCD: CPT | Performed by: FAMILY MEDICINE

## 2023-09-18 PROCEDURE — 81003 URINALYSIS AUTO W/O SCOPE: CPT

## 2023-09-18 PROCEDURE — G0008 ADMIN INFLUENZA VIRUS VAC: HCPCS | Performed by: FAMILY MEDICINE

## 2023-09-18 PROCEDURE — 83970 ASSAY OF PARATHORMONE: CPT

## 2023-09-18 PROCEDURE — 3075F SYST BP GE 130 - 139MM HG: CPT | Performed by: FAMILY MEDICINE

## 2023-09-18 PROCEDURE — 99214 OFFICE O/P EST MOD 30 MIN: CPT | Performed by: FAMILY MEDICINE

## 2023-09-18 PROCEDURE — 90662 IIV NO PRSV INCREASED AG IM: CPT | Performed by: FAMILY MEDICINE

## 2023-09-18 PROCEDURE — 80069 RENAL FUNCTION PANEL: CPT

## 2023-09-18 PROCEDURE — 1160F RVW MEDS BY RX/DR IN RCRD: CPT | Performed by: FAMILY MEDICINE

## 2023-09-18 PROCEDURE — 82306 VITAMIN D 25 HYDROXY: CPT

## 2023-09-18 PROCEDURE — 84443 ASSAY THYROID STIM HORMONE: CPT

## 2023-09-18 PROCEDURE — 3079F DIAST BP 80-89 MM HG: CPT | Performed by: FAMILY MEDICINE

## 2023-09-18 PROCEDURE — 36415 COLL VENOUS BLD VENIPUNCTURE: CPT

## 2023-09-18 PROCEDURE — 80061 LIPID PANEL: CPT

## 2023-09-18 PROCEDURE — 82570 ASSAY OF URINE CREATININE: CPT

## 2023-09-18 PROCEDURE — 82043 UR ALBUMIN QUANTITATIVE: CPT

## 2023-09-18 PROCEDURE — 84450 TRANSFERASE (AST) (SGOT): CPT

## 2023-09-18 RX ORDER — ALBUTEROL SULFATE 90 UG/1
2 AEROSOL, METERED RESPIRATORY (INHALATION) EVERY 6 HOURS PRN
Qty: 1 EACH | Refills: 3 | Status: SHIPPED | OUTPATIENT
Start: 2023-09-18

## 2023-09-23 DIAGNOSIS — E78.00 HYPERCHOLESTEREMIA: ICD-10-CM

## 2023-09-23 DIAGNOSIS — I10 ESSENTIAL HYPERTENSION: ICD-10-CM

## 2023-09-23 DIAGNOSIS — E03.9 HYPOTHYROIDISM, UNSPECIFIED TYPE: ICD-10-CM

## 2023-09-23 RX ORDER — LISINOPRIL 40 MG/1
40 TABLET ORAL DAILY
Qty: 90 TABLET | Refills: 3 | Status: SHIPPED | OUTPATIENT
Start: 2023-09-23

## 2023-09-23 RX ORDER — ATORVASTATIN CALCIUM 10 MG/1
10 TABLET, FILM COATED ORAL DAILY
Qty: 90 TABLET | Refills: 3 | Status: SHIPPED | OUTPATIENT
Start: 2023-09-23

## 2023-09-23 RX ORDER — LEVOTHYROXINE SODIUM 0.05 MG/1
50 TABLET ORAL
Qty: 90 TABLET | Refills: 3 | Status: SHIPPED | OUTPATIENT
Start: 2023-09-23

## 2023-09-25 ENCOUNTER — LAB ENCOUNTER (OUTPATIENT)
Dept: LAB | Age: 73
End: 2023-09-25
Attending: FAMILY MEDICINE
Payer: MEDICARE

## 2023-09-25 DIAGNOSIS — Z12.11 COLON CANCER SCREENING: ICD-10-CM

## 2023-09-25 LAB — HEMOCCULT STL QL: NEGATIVE

## 2023-09-25 PROCEDURE — 82274 ASSAY TEST FOR BLOOD FECAL: CPT

## 2023-11-14 ENCOUNTER — NURSE TRIAGE (OUTPATIENT)
Dept: FAMILY MEDICINE CLINIC | Facility: CLINIC | Age: 73
End: 2023-11-14

## 2023-11-14 NOTE — TELEPHONE ENCOUNTER
Action Requested: Summary for Provider     []  Critical Lab, Recommendations Needed  [] Need Additional Advice  []   FYI    []   Need Orders  [] Need Medications Sent to Pharmacy  []  Other     SUMMARY: Per protocol advised : Office visit  today     Future Appointments   Date Time Provider Geovany Longi   2023  3:20 PM ARIANA Collier           Reason for call: Chest Congestion  Onset: Data Unavailable    Patient calling ( identified name and  )states has had cold like symptoms for the past 5 days     Reports nasal congestion , and runny nose and chest congestion , has cough with slight clear sputum, hears wheezing at times  , coughed often during this call  Has tried OTC cold medication  Delsyum with slight relief   Denies chest pain, no SOB     Did home  Covid test and was Negative on     Appointment made   Patient verbalizes understanding and agrees with plan.      Reason for Disposition   Wheezing is present    Protocols used: Cough-A-OH

## 2023-11-15 ENCOUNTER — OFFICE VISIT (OUTPATIENT)
Dept: INTERNAL MEDICINE CLINIC | Facility: CLINIC | Age: 73
End: 2023-11-15
Payer: MEDICARE

## 2023-11-15 VITALS
HEIGHT: 62 IN | SYSTOLIC BLOOD PRESSURE: 172 MMHG | BODY MASS INDEX: 30.11 KG/M2 | OXYGEN SATURATION: 100 % | HEART RATE: 66 BPM | DIASTOLIC BLOOD PRESSURE: 66 MMHG | TEMPERATURE: 98 F | WEIGHT: 163.63 LBS

## 2023-11-15 DIAGNOSIS — R09.82 POST-NASAL DRIP: ICD-10-CM

## 2023-11-15 DIAGNOSIS — J06.9 URI WITH COUGH AND CONGESTION: Primary | ICD-10-CM

## 2023-11-15 DIAGNOSIS — I10 ESSENTIAL HYPERTENSION: ICD-10-CM

## 2023-11-15 PROCEDURE — 1159F MED LIST DOCD IN RCRD: CPT | Performed by: NURSE PRACTITIONER

## 2023-11-15 PROCEDURE — 3077F SYST BP >= 140 MM HG: CPT | Performed by: NURSE PRACTITIONER

## 2023-11-15 PROCEDURE — 3078F DIAST BP <80 MM HG: CPT | Performed by: NURSE PRACTITIONER

## 2023-11-15 PROCEDURE — 99203 OFFICE O/P NEW LOW 30 MIN: CPT | Performed by: NURSE PRACTITIONER

## 2023-11-15 PROCEDURE — 1160F RVW MEDS BY RX/DR IN RCRD: CPT | Performed by: NURSE PRACTITIONER

## 2023-11-15 PROCEDURE — 3008F BODY MASS INDEX DOCD: CPT | Performed by: NURSE PRACTITIONER

## 2023-11-15 RX ORDER — METHYLPREDNISOLONE 4 MG/1
TABLET ORAL
Qty: 21 TABLET | Refills: 0 | Status: SHIPPED | OUTPATIENT
Start: 2023-11-15

## 2023-11-15 RX ORDER — METOPROLOL SUCCINATE 50 MG/1
50 TABLET, EXTENDED RELEASE ORAL DAILY
Qty: 90 TABLET | Refills: 0 | Status: SHIPPED | OUTPATIENT
Start: 2023-11-15

## 2023-11-15 RX ORDER — AZITHROMYCIN 250 MG/1
TABLET, FILM COATED ORAL
Qty: 6 TABLET | Refills: 0 | Status: SHIPPED | OUTPATIENT
Start: 2023-11-15 | End: 2023-11-19

## 2023-11-15 RX ORDER — FLUTICASONE PROPIONATE 50 MCG
2 SPRAY, SUSPENSION (ML) NASAL DAILY
Qty: 11.1 ML | Refills: 0 | Status: SHIPPED | OUTPATIENT
Start: 2023-11-15 | End: 2024-11-09

## 2024-01-23 ENCOUNTER — OFFICE VISIT (OUTPATIENT)
Dept: FAMILY MEDICINE CLINIC | Facility: CLINIC | Age: 74
End: 2024-01-23
Payer: MEDICARE

## 2024-01-23 VITALS
SYSTOLIC BLOOD PRESSURE: 138 MMHG | BODY MASS INDEX: 30.73 KG/M2 | DIASTOLIC BLOOD PRESSURE: 80 MMHG | TEMPERATURE: 99 F | HEIGHT: 62 IN | WEIGHT: 167 LBS | HEART RATE: 80 BPM

## 2024-01-23 DIAGNOSIS — E03.9 HYPOTHYROIDISM, UNSPECIFIED TYPE: ICD-10-CM

## 2024-01-23 DIAGNOSIS — Z01.419 ENCOUNTER FOR GYNECOLOGICAL EXAMINATION WITHOUT ABNORMAL FINDING: ICD-10-CM

## 2024-01-23 DIAGNOSIS — N18.32 STAGE 3B CHRONIC KIDNEY DISEASE (HCC): ICD-10-CM

## 2024-01-23 DIAGNOSIS — E55.9 VITAMIN D DEFICIENCY: ICD-10-CM

## 2024-01-23 DIAGNOSIS — Z00.00 ENCOUNTER FOR ANNUAL HEALTH EXAMINATION: Primary | ICD-10-CM

## 2024-01-23 DIAGNOSIS — E78.00 HYPERCHOLESTEREMIA: ICD-10-CM

## 2024-01-23 DIAGNOSIS — E66.9 OBESITY (BMI 30.0-34.9): ICD-10-CM

## 2024-01-23 DIAGNOSIS — R73.9 HYPERGLYCEMIA: ICD-10-CM

## 2024-01-23 DIAGNOSIS — I10 ESSENTIAL HYPERTENSION: ICD-10-CM

## 2024-01-23 DIAGNOSIS — Q61.3 PKD (POLYCYSTIC KIDNEY DISEASE): ICD-10-CM

## 2024-01-23 DIAGNOSIS — J45.20 MILD INTERMITTENT REACTIVE AIRWAY DISEASE WITHOUT COMPLICATION: ICD-10-CM

## 2024-01-23 PROBLEM — Z80.41 FH: OVARIAN CANCER IN FIRST DEGREE RELATIVE: Status: RESOLVED | Noted: 2022-03-02 | Resolved: 2024-01-23

## 2024-01-23 PROBLEM — Z80.3 FH: BREAST CANCER IN FIRST DEGREE RELATIVE: Status: RESOLVED | Noted: 2022-03-02 | Resolved: 2024-01-23

## 2024-01-23 PROBLEM — R79.89 ELEVATED PARATHYROID HORMONE: Status: RESOLVED | Noted: 2022-03-02 | Resolved: 2024-01-23

## 2024-01-23 PROCEDURE — 1160F RVW MEDS BY RX/DR IN RCRD: CPT | Performed by: FAMILY MEDICINE

## 2024-01-23 PROCEDURE — 3079F DIAST BP 80-89 MM HG: CPT | Performed by: FAMILY MEDICINE

## 2024-01-23 PROCEDURE — 96160 PT-FOCUSED HLTH RISK ASSMT: CPT | Performed by: FAMILY MEDICINE

## 2024-01-23 PROCEDURE — G0439 PPPS, SUBSEQ VISIT: HCPCS | Performed by: FAMILY MEDICINE

## 2024-01-23 PROCEDURE — G0101 CA SCREEN;PELVIC/BREAST EXAM: HCPCS | Performed by: FAMILY MEDICINE

## 2024-01-23 PROCEDURE — 3008F BODY MASS INDEX DOCD: CPT | Performed by: FAMILY MEDICINE

## 2024-01-23 PROCEDURE — 1170F FXNL STATUS ASSESSED: CPT | Performed by: FAMILY MEDICINE

## 2024-01-23 PROCEDURE — 3075F SYST BP GE 130 - 139MM HG: CPT | Performed by: FAMILY MEDICINE

## 2024-01-23 PROCEDURE — 1159F MED LIST DOCD IN RCRD: CPT | Performed by: FAMILY MEDICINE

## 2024-01-23 PROCEDURE — 1126F AMNT PAIN NOTED NONE PRSNT: CPT | Performed by: FAMILY MEDICINE

## 2024-01-23 PROCEDURE — 99214 OFFICE O/P EST MOD 30 MIN: CPT | Performed by: FAMILY MEDICINE

## 2024-01-23 RX ORDER — METOPROLOL SUCCINATE 50 MG/1
50 TABLET, EXTENDED RELEASE ORAL DAILY
Qty: 90 TABLET | Refills: 3 | Status: SHIPPED | OUTPATIENT
Start: 2024-01-23

## 2024-01-23 NOTE — PATIENT INSTRUCTIONS
Radha Wells's SCREENING SCHEDULE   Tests on this list are recommended by your physician but may not be covered, or covered at this frequency, by your insurer.   Please check with your insurance carrier before scheduling to verify coverage.   PREVENTATIVE SERVICES FREQUENCY &  COVERAGE DETAILS LAST COMPLETION DATE   Diabetes Screening    Fasting Blood Sugar /  Glucose    One screening every 12 months if never tested or if previously tested but not diagnosed with pre-diabetes   One screening every 6 months if diagnosed with pre-diabetes Lab Results   Component Value Date     (H) 09/18/2023        Cardiovascular Disease Screening    Lipid Panel  Cholesterol  Lipoprotein (HDL)  Triglycerides Covered every 5 years for all Medicare beneficiaries without apparent signs or symptoms of cardiovascular disease Lab Results   Component Value Date    CHOLEST 129 09/18/2023    HDL 60 (H) 09/18/2023    LDL 49 09/18/2023    TRIG 112 09/18/2023         Electrocardiogram (EKG)   Covered if needed at [facility] to Medicare, and non-screening if indicated for medical reasons 03/04/2020      Ultrasound Screening for Abdominal Aortic Aneurysm (AAA) Covered once in a lifetime for one of the following risk factors   • Men who are 65-75 years old and have ever smoked   • Anyone with a family history -     Colorectal Cancer Screening  Covered for ages 50-85; only need ONE of the following:    Colonoscopy   Covered every 10 years    Covered every 2 years if patient is at high risk or previous colonoscopy was abnormal 02/24/2014    Health Maintenance   Topic Date Due   • Colorectal Cancer Screening  09/25/2024       Flexible Sigmoidoscopy   Covered every 4 years -    Fecal Occult Blood Test Covered annually 09/25/2023   Bone Density Screening    Bone density screening    Covered every 2 years after age 65 if diagnosed with risk of osteoporosis or estrogen deficiency.    Covered yearly for long-term glucocorticoid medication use (Steroids)  Last Dexa Scan:    XR DEXA BONE DENSITOMETRY (CPT=77080) 02/17/2018      No recommendations at this time   Pap and Pelvic    Pap   Covered every 2 years for women at normal risk; Annually if at high risk -  No recommendations at this time    Chlamydia Annually if high risk 09/25/2023  No recommendations at this time   Screening Mammogram    Mammogram     Recommend annually for all female patients aged 40 and older    One baseline mammogram covered for patients aged 35-39 05/22/2023    Health Maintenance   Topic Date Due   • Mammogram  05/22/2024       Immunizations    Influenza Covered once per flu season  Please get every year 09/18/2023  No recommendations at this time    Pneumococcal Each vaccine ([id number] & [id number]) covered once after 65 Prevnar 13: 02/25/2019    [id number]: 02/05/2018     No recommendations at this time    Hepatitis B One screening covered for patients with certain risk factors   -  No recommendations at this time    Tetanus Toxoid Not covered by Medicare Part B unless medically necessary (cut with metal); may be covered with your pharmacy prescription benefits -    Tetanus, Diptheria and Pertusis TD and TDaP Not covered by Medicare Part B -  No recommendations at this time    Zoster Not covered by Medicare Part B; may be covered with your pharmacy  prescription benefits -  No recommendations at this time     Annual Monitoring of Persistent Medications (ACE/ARB, digoxin diuretics, anticonvulsants)    Potassium Annually Lab Results   Component Value Date    K 4.6 09/18/2023         Creatinine   Annually Lab Results   Component Value Date    CREATSERUM 1.46 (H) 09/18/2023         BUN Annually Lab Results   Component Value Date    BUN 32 (H) 09/18/2023       Drug Serum Conc Annually No results found for: \"DIGOXIN\", \"DIG\", \"VALP\"

## 2024-01-23 NOTE — PROGRESS NOTES
Subjective:   Radha Wells is a 73 year old female who presents for a MA (Medicare Advantage) Supervisit (Once per calendar year) and scheduled follow up of multiple significant but stable problems.     Wt Readings from Last 6 Encounters:   01/23/24 167 lb (75.8 kg)   11/15/23 163 lb 9.6 oz (74.2 kg)   09/18/23 165 lb (74.8 kg)   08/23/23 166 lb (75.3 kg)   03/21/23 165 lb (74.8 kg)   01/12/23 164 lb (74.4 kg)     BP Readings from Last 3 Encounters:   01/23/24 138/80   11/15/23 (!) 172/66   09/18/23 138/80         History/Other:   Fall Risk Assessment:   She has been screened for Falls and is low risk.      Cognitive Assessment:   She had a completely normal cognitive assessment - see flowsheet entries     Functional Ability/Status:   Radha Wells has a completely normal functional assessment. See flowsheet for details.        Depression Screening (PHQ-2/PHQ-9): PHQ-2 SCORE: 0  , done 1/23/2024            Advanced Directives:   She does NOT have a Living Will. [Do you have a living will?: No]  She does NOT have a Power of  for Health Care. [Do you have a healthcare power of ?: No]  Discussed Advance Care Planning with patient (and family/surrogate if present). Standard forms made available to patient in After Visit Summary.      Patient Active Problem List   Diagnosis    Essential hypertension    Hypothyroidism    Obesity (BMI 30.0-34.9)    Vitamin D deficiency    Chronic kidney disease, stage III (moderate) (HCC)    PKD (polycystic kidney disease)    Reactive airway disease    Hypercholesteremia     Allergies:  She is allergic to seasonal.    Current Medications:  Outpatient Medications Marked as Taking for the 1/23/24 encounter (Office Visit) with Marcell Cardoza MD   Medication Sig    metoprolol succinate ER 50 MG Oral Tablet 24 Hr Take 1 tablet (50 mg total) by mouth daily.       Medical History:  She  has a past medical history of Allergic rhinitis, Asthma, Essential hypertension, and  Hypothyroidism.  Surgical History:  She  has a past surgical history that includes colposcopy, cervix w/upper adjacent vagina; w/biopsy(s), cervix; tubal ligation; and .   Family History:  Her family history includes Breast Cancer (age of onset: 57) in her sister; Heart Disorder in her mother; Hypertension in her father and mother; Ovarian Cancer (age of onset: 55) in her sister.  Social History:  She  reports that she has never smoked. She has never used smokeless tobacco. She reports that she does not drink alcohol and does not use drugs.    Tobacco:  She has never smoked tobacco.    CAGE Alcohol Screen:   CAGE screening score of 0 on 2024, showing low risk of alcohol abuse.      Patient Care Team:  Marcell Cardoza MD as PCP - General (Family Medicine)    Review of Systems     Negative     Objective:   Physical Exam  General Appearance:  Alert, cooperative, no distress, appears stated age   Head:  Normocephalic, without obvious abnormality, atraumatic   Eyes:  PERRL, conjunctiva/corneas clear, EOM's intact both eyes   Ears:  Normal TM's and external ear canals, both ears   Nose: Nares normal, septum midline,mucosa normal, no drainage or sinus tenderness   Throat: Lips, mucosa, and tongue normal; teeth and gums normal   Neck: Supple, symmetrical, trachea midline, no adenopathy;  thyroid: not enlarged, symmetric, no tenderness/mass/nodules; no carotid bruit or JVD   Back:   Symmetric, no curvature, ROM normal, no CVA tenderness   Lungs:   Clear to auscultation bilaterally, respirations unlabored   Heart:  Regular rate and rhythm, S1 and S2 normal, no murmur, rub, or gallop   Abdomen:   Soft, non-tender, bowel sounds active all four quadrants,  no masses, no organomegaly   Pelvic: Deferred   Extremities: Extremities normal, atraumatic, no cyanosis or edema   Pulses: 2+ and symmetric   Skin: Skin color, texture, turgor normal, no rashes or lesions   Lymph nodes: Cervical, supraclavicular, and axillary nodes  normal   Neurologic: Normal   , Breasts:  normal appearance, no masses or tenderness, Inspection negative, No nipple retraction or dimpling, No nipple discharge or bleeding, No axillary or supraclavicular adenopathy, Normal to palpation without dominant masses, Taught monthly breast self examination, and Pelvic: cervix normal in appearance, external genitalia normal, no adnexal masses or tenderness, no cervical motion tenderness, rectovaginal septum normal, uterus normal size, shape, and consistency, and vagina normal without discharge    /80   Pulse 80   Temp 98.8 °F (37.1 °C) (Temporal)   Ht 5' 2\" (1.575 m)   Wt 167 lb (75.8 kg)   BMI 30.54 kg/m²  Estimated body mass index is 30.54 kg/m² as calculated from the following:    Height as of this encounter: 5' 2\" (1.575 m).    Weight as of this encounter: 167 lb (75.8 kg).    Medicare Hearing Assessment:   Hearing Screening    Screening Method: Finger Rub  Finger Rub Result: Pass                     Assessment & Plan:   Radha Wells is a 73 year old female who presents for a Medicare Assessment.     1. Encounter for annual health examination (Primary)  2. Hypothyroidism, unspecified type  -     Detailed, Mod Complex (99214)  -     TSH W Reflex To Free T4; Future; Expected date: 01/23/2024  3. Essential hypertension  -     Detailed, Mod Complex (99214)  -     Renal Function Panel; Future; Expected date: 01/23/2024  -     Metoprolol Succinate ER; Take 1 tablet (50 mg total) by mouth daily.  Dispense: 90 tablet; Refill: 3  4. Stage 3b chronic kidney disease (HCC)  -     Detailed, Mod Complex (99214)  -     Renal Function Panel; Future; Expected date: 01/23/2024  -     Urinalysis, Routine; Future; Expected date: 01/23/2024  -     NEPHROLOGY - INTERNAL  5. PKD (polycystic kidney disease)  -     Urinalysis, Routine; Future; Expected date: 01/23/2024  -     NEPHROLOGY - INTERNAL  6. Hypercholesteremia  -     Detailed, Mod Complex (99214)  -     AST (SGOT); Future;  Expected date: 01/23/2024  -     ALT(SGPT); Future; Expected date: 01/23/2024  -     Lipid Panel; Future; Expected date: 01/23/2024  7. Encounter for gynecological examination without abnormal finding  -     Breast and Pelvic Exam, Covered every 2 years []  -     Cancel: Obtain Pap Smear, Low risk []  (Every 2 Years)  8. Hyperglycemia  -     Hemoglobin A1C; Future; Expected date: 01/23/2024  9. Mild intermittent reactive airway disease without complication  10. Vitamin D deficiency  11. Obesity (BMI 30.0-34.9)      1. Encounter for annual health examination      2. Hypothyroidism, unspecified type  Check labs  Stable condition  Reviewed medications  Continue current medication management   All questions answered to the best of my ability.    - Detailed, Mod Complex (22135)  - TSH W Reflex To Free T4; Future    3. Essential hypertension  Stable condition  Reviewed medications  Continue current medication management   All questions answered to the best of my ability.    - Detailed, Mod Complex (67443)  - Renal Function Panel [E]; Future  - metoprolol succinate ER 50 MG Oral Tablet 24 Hr; Take 1 tablet (50 mg total) by mouth daily.  Dispense: 90 tablet; Refill: 3    4. Stage 3b chronic kidney disease (HCC)  Follow up nephrology  - Detailed, Mod Complex (86507)  - Renal Function Panel [E]; Future  - Urinalysis, Routine; Future  - NEPHROLOGY - INTERNAL    5. PKD (polycystic kidney disease)  Follow up nephrology  - Urinalysis, Routine; Future  - NEPHROLOGY - INTERNAL    6. Hypercholesteremia  Stable condition  Reviewed medications  Continue current medication management   All questions answered to the best of my ability.    - Detailed, Mod Complex (64178)  - AST (SGOT); Future  - ALT(SGPT); Future  - Lipid Panel; Future    7. Encounter for gynecological examination without abnormal finding    - Breast and Pelvic Exam, Covered every 2 years []    8. Hyperglycemia    - Hemoglobin A1C; Future    9. Mild  intermittent reactive airway disease without complication  Stable  Albuterol as needed      10. Vitamin D deficiency  replaced    11. Obesity (BMI 30.0-34.9)  Wt Readings from Last 6 Encounters:   01/23/24 167 lb (75.8 kg)   11/15/23 163 lb 9.6 oz (74.2 kg)   09/18/23 165 lb (74.8 kg)   08/23/23 166 lb (75.3 kg)   03/21/23 165 lb (74.8 kg)   01/12/23 164 lb (74.4 kg)       Highly recommend to lose weight.  Discussed good dietary and eating habits as well as increasing vegetable and fruit intake.  Recommending avoiding foods high in fat content.  Recommend exercising at least 30-40 minutes 5-6 days a week.  Avoid skipping meals.  Making healthy choices for snacks and also limiting sugary beverages.      The patient indicates understanding of these issues and agrees to the plan.        Return in about 6 months (around 7/23/2024) for Chronic problems.     Marcell Cardoza MD, 1/23/2024     Supplementary Documentation:   General Health:  In the past six months, have you lost more than 10 pounds without trying?: 2 - No  Has your appetite been poor?: No  Type of Diet: Balanced  How does the patient maintain a good energy level?: Daily Walks  How would you describe your daily physical activity?: Moderate  How would you describe your current health state?: Fair  How do you maintain positive mental well-being?: Social Interaction;Puzzles;Games;Visiting Friends;Visiting Family  On a scale of 0 to 10, with 0 being no pain and 10 being severe pain, what is your pain level?: 0 - (None)  In the past six months, have you experienced urine leakage?: 1-Yes  At any time do you feel concerned for the safety/well-being of yourself and/or your children, in your home or elsewhere?: No  Have you had any immunizations at another office such as Influenza, Hepatitis B, Tetanus, or Pneumococcal?: No         Radha Wells's SCREENING SCHEDULE   Tests on this list are recommended by your physician but may not be covered, or covered at this  frequency, by your insurer.   Please check with your insurance carrier before scheduling to verify coverage.   PREVENTATIVE SERVICES FREQUENCY &  COVERAGE DETAILS LAST COMPLETION DATE   Diabetes Screening    Fasting Blood Sugar /  Glucose    One screening every 12 months if never tested or if previously tested but not diagnosed with pre-diabetes   One screening every 6 months if diagnosed with pre-diabetes Lab Results   Component Value Date     (H) 09/18/2023        Cardiovascular Disease Screening    Lipid Panel  Cholesterol  Lipoprotein (HDL)  Triglycerides Covered every 5 years for all Medicare beneficiaries without apparent signs or symptoms of cardiovascular disease Lab Results   Component Value Date    CHOLEST 129 09/18/2023    HDL 60 (H) 09/18/2023    LDL 49 09/18/2023    TRIG 112 09/18/2023         Electrocardiogram (EKG)   Covered if needed at Welcome to Medicare, and non-screening if indicated for medical reasons 03/04/2020      Ultrasound Screening for Abdominal Aortic Aneurysm (AAA) Covered once in a lifetime for one of the following risk factors    Men who are 65-75 years old and have ever smoked    Anyone with a family history -     Colorectal Cancer Screening  Covered for ages 50-85; only need ONE of the following:    Colonoscopy   Covered every 10 years    Covered every 2 years if patient is at high risk or previous colonoscopy was abnormal 02/24/2014    Health Maintenance   Topic Date Due    Colorectal Cancer Screening  09/25/2024       Flexible Sigmoidoscopy   Covered every 4 years -    Fecal Occult Blood Test Covered annually 09/25/2023   Bone Density Screening    Bone density screening    Covered every 2 years after age 65 if diagnosed with risk of osteoporosis or estrogen deficiency.    Covered yearly for long-term glucocorticoid medication use (Steroids) Last Dexa Scan:    XR DEXA BONE DENSITOMETRY (CPT=77080) 02/17/2018      No recommendations at this time   Pap and Pelvic    Pap    Covered every 2 years for women at normal risk; Annually if at high risk -  No recommendations at this time    Chlamydia Annually if high risk 09/25/2023  No recommendations at this time   Screening Mammogram    Mammogram     Recommend annually for all female patients aged 40 and older    One baseline mammogram covered for patients aged 35-39 05/22/2023    Health Maintenance   Topic Date Due    Mammogram  05/22/2024       Immunizations    Influenza Covered once per flu season  Please get every year 09/18/2023  No recommendations at this time    Pneumococcal Each vaccine ([id number] & [id number]) covered once after 65 Prevnar 13: 02/25/2019    [id number]: 02/05/2018     No recommendations at this time    Hepatitis B One screening covered for patients with certain risk factors   -  No recommendations at this time    Tetanus Toxoid Not covered by Medicare Part B unless medically necessary (cut with metal); may be covered with your pharmacy prescription benefits -    Tetanus, Diptheria and Pertusis TD and TDaP Not covered by Medicare Part B -  No recommendations at this time    Zoster Not covered by Medicare Part B; may be covered with your pharmacy  prescription benefits -  No recommendations at this time     Annual Monitoring of Persistent Medications (ACE/ARB, digoxin diuretics, anticonvulsants)    Potassium Annually Lab Results   Component Value Date    K 4.6 09/18/2023         Creatinine   Annually Lab Results   Component Value Date    CREATSERUM 1.46 (H) 09/18/2023         BUN Annually Lab Results   Component Value Date    BUN 32 (H) 09/18/2023       Drug Serum Conc Annually No results found for: \"DIGOXIN\", \"DIG\", \"VALP\"

## 2024-01-24 ENCOUNTER — LAB ENCOUNTER (OUTPATIENT)
Dept: LAB | Age: 74
End: 2024-01-24
Attending: FAMILY MEDICINE
Payer: MEDICARE

## 2024-01-24 DIAGNOSIS — I10 ESSENTIAL HYPERTENSION: ICD-10-CM

## 2024-01-24 DIAGNOSIS — Q61.3 PKD (POLYCYSTIC KIDNEY DISEASE): ICD-10-CM

## 2024-01-24 DIAGNOSIS — N18.32 STAGE 3B CHRONIC KIDNEY DISEASE (HCC): ICD-10-CM

## 2024-01-24 DIAGNOSIS — E78.00 HYPERCHOLESTEREMIA: ICD-10-CM

## 2024-01-24 DIAGNOSIS — E03.9 HYPOTHYROIDISM, UNSPECIFIED TYPE: ICD-10-CM

## 2024-01-24 DIAGNOSIS — R73.9 HYPERGLYCEMIA: ICD-10-CM

## 2024-01-24 LAB
ALBUMIN SERPL-MCNC: 4.2 G/DL (ref 3.2–4.8)
ALT SERPL-CCNC: 8 U/L
ANION GAP SERPL CALC-SCNC: 5 MMOL/L (ref 0–18)
AST SERPL-CCNC: 21 U/L (ref ?–34)
BILIRUB UR QL: NEGATIVE
BUN BLD-MCNC: 34 MG/DL (ref 9–23)
BUN/CREAT SERPL: 21.4 (ref 10–20)
CALCIUM BLD-MCNC: 10.1 MG/DL (ref 8.7–10.4)
CHLORIDE SERPL-SCNC: 108 MMOL/L (ref 98–112)
CHOLEST SERPL-MCNC: 140 MG/DL (ref ?–200)
CLARITY UR: CLEAR
CO2 SERPL-SCNC: 28 MMOL/L (ref 21–32)
CREAT BLD-MCNC: 1.59 MG/DL
EGFRCR SERPLBLD CKD-EPI 2021: 35 ML/MIN/1.73M2 (ref 60–?)
EST. AVERAGE GLUCOSE BLD GHB EST-MCNC: 117 MG/DL (ref 68–126)
FASTING PATIENT LIPID ANSWER: YES
GLUCOSE BLD-MCNC: 107 MG/DL (ref 70–99)
GLUCOSE UR-MCNC: NORMAL MG/DL
HBA1C MFR BLD: 5.7 % (ref ?–5.7)
HDLC SERPL-MCNC: 61 MG/DL (ref 40–59)
HGB UR QL STRIP.AUTO: NEGATIVE
KETONES UR-MCNC: NEGATIVE MG/DL
LDLC SERPL CALC-MCNC: 63 MG/DL (ref ?–100)
LEUKOCYTE ESTERASE UR QL STRIP.AUTO: 75
NITRITE UR QL STRIP.AUTO: NEGATIVE
NONHDLC SERPL-MCNC: 79 MG/DL (ref ?–130)
OSMOLALITY SERPL CALC.SUM OF ELEC: 300 MOSM/KG (ref 275–295)
PH UR: 5 [PH] (ref 5–8)
PHOSPHATE SERPL-MCNC: 3.3 MG/DL (ref 2.4–5.1)
POTASSIUM SERPL-SCNC: 5 MMOL/L (ref 3.5–5.1)
PROT UR-MCNC: NEGATIVE MG/DL
SODIUM SERPL-SCNC: 141 MMOL/L (ref 136–145)
SP GR UR STRIP: 1.02 (ref 1–1.03)
TRIGL SERPL-MCNC: 82 MG/DL (ref 30–149)
TSI SER-ACNC: 1.74 MIU/ML (ref 0.55–4.78)
UROBILINOGEN UR STRIP-ACNC: NORMAL
VLDLC SERPL CALC-MCNC: 12 MG/DL (ref 0–30)

## 2024-01-24 PROCEDURE — 36415 COLL VENOUS BLD VENIPUNCTURE: CPT

## 2024-01-24 PROCEDURE — 80069 RENAL FUNCTION PANEL: CPT

## 2024-01-24 PROCEDURE — 83036 HEMOGLOBIN GLYCOSYLATED A1C: CPT

## 2024-01-24 PROCEDURE — 80061 LIPID PANEL: CPT

## 2024-01-24 PROCEDURE — 84443 ASSAY THYROID STIM HORMONE: CPT

## 2024-01-24 PROCEDURE — 84450 TRANSFERASE (AST) (SGOT): CPT

## 2024-01-24 PROCEDURE — 81001 URINALYSIS AUTO W/SCOPE: CPT

## 2024-01-28 DIAGNOSIS — R82.90 ABNORMAL URINALYSIS: Primary | ICD-10-CM

## 2024-02-16 ENCOUNTER — LAB ENCOUNTER (OUTPATIENT)
Dept: LAB | Age: 74
End: 2024-02-16
Attending: FAMILY MEDICINE
Payer: MEDICARE

## 2024-02-16 DIAGNOSIS — R82.90 ABNORMAL URINALYSIS: ICD-10-CM

## 2024-02-16 PROCEDURE — 87147 CULTURE TYPE IMMUNOLOGIC: CPT

## 2024-02-16 PROCEDURE — 87086 URINE CULTURE/COLONY COUNT: CPT

## 2024-03-07 ENCOUNTER — OFFICE VISIT (OUTPATIENT)
Dept: NEPHROLOGY | Facility: CLINIC | Age: 74
End: 2024-03-07
Payer: MEDICARE

## 2024-03-07 VITALS
HEART RATE: 68 BPM | BODY MASS INDEX: 30 KG/M2 | HEIGHT: 62 IN | SYSTOLIC BLOOD PRESSURE: 176 MMHG | DIASTOLIC BLOOD PRESSURE: 74 MMHG | WEIGHT: 163 LBS

## 2024-03-07 DIAGNOSIS — I10 ESSENTIAL HYPERTENSION: ICD-10-CM

## 2024-03-07 DIAGNOSIS — N18.31 STAGE 3A CHRONIC KIDNEY DISEASE (HCC): Primary | ICD-10-CM

## 2024-03-07 DIAGNOSIS — Q61.3 POLYCYSTIC KIDNEY DISEASE: ICD-10-CM

## 2024-03-07 PROCEDURE — 3008F BODY MASS INDEX DOCD: CPT | Performed by: INTERNAL MEDICINE

## 2024-03-07 PROCEDURE — 99215 OFFICE O/P EST HI 40 MIN: CPT | Performed by: INTERNAL MEDICINE

## 2024-03-07 PROCEDURE — 3078F DIAST BP <80 MM HG: CPT | Performed by: INTERNAL MEDICINE

## 2024-03-07 PROCEDURE — 3077F SYST BP >= 140 MM HG: CPT | Performed by: INTERNAL MEDICINE

## 2024-03-07 PROCEDURE — 1159F MED LIST DOCD IN RCRD: CPT | Performed by: INTERNAL MEDICINE

## 2024-03-07 NOTE — PROGRESS NOTES
Progress Note:      Patient is a 73 yrs old female with pmh of CKD stage III, polycystic kidney disease, HTN x 20 yrs, Hypothyroidism, Asthma who presented for follow up     Started following with Dr. Cardoza in 2018. Recent Lab test showed BUN/Cr 40/1.4 mg/dl with an eGFR 39 ml/min. Albumin and calcium wnl. TSH wnl. Creatinine from 2018 1.37 mg/dl with an eGFR 41 ml/min. Hgb 11.5 g/dl    Both parents .  at age of 70 and 80s. No known kidney disease in parents. One sister and 2 brother  of cancer and CAD respectively. Other siblings have cancer , alzheimers but no known kidney disease.     Patient has 3 kids - 42 yrs and 46 yrs daughter. And 40 yrs old son with no known kidney issues     Takes OTC NSAIDs once in great while. No herbal supplement. No urinary complaints    Non smoker, no alcohol. No FH of kidney disease. Drink plenty of water. Retired recently. No new complaints    Drinking 65-75 ounces of liquids.     No leg swelling or urinary complaints. Has white coat HTN. Readings are mostly at home in 120-130s range. State go to gym every day and walk for an hour to get to gym    HISTORY:  Past Medical History:   Diagnosis Date    Allergic rhinitis     Asthma (HCC)     Essential hypertension     Hypothyroidism       Past Surgical History:   Procedure Laterality Date    COLPOSCOPY, CERVIX W/UPPER ADJACENT VAGINA; W/BIOPSY(S), CERVIX            3    TUBAL LIGATION        Family History   Problem Relation Age of Onset    Hypertension Father     Hypertension Mother     Heart Disorder Mother     Ovarian Cancer Sister 55    Breast Cancer Sister 57    Diabetes Neg       Social History:   Social History     Socioeconomic History    Marital status:    Tobacco Use    Smoking status: Never    Smokeless tobacco: Never   Vaping Use    Vaping Use: Never used   Substance and Sexual Activity    Alcohol use: No    Drug use: No        Medications (Active prior to today's visit):  Current Outpatient  Medications   Medication Sig Dispense Refill    metoprolol succinate ER 50 MG Oral Tablet 24 Hr Take 1 tablet (50 mg total) by mouth daily. 90 tablet 3    atorvastatin 10 MG Oral Tab Take 1 tablet (10 mg total) by mouth daily. 90 tablet 3    levothyroxine 50 MCG Oral Tab Take 1 tablet (50 mcg total) by mouth before breakfast. 90 tablet 3    lisinopril 40 MG Oral Tab Take 1 tablet (40 mg total) by mouth daily. 90 tablet 3    fluticasone propionate 50 MCG/ACT Nasal Suspension 2 sprays by Each Nare route daily. (Patient not taking: Reported on 3/7/2024) 11.1 mL 0    albuterol (PROAIR HFA) 108 (90 Base) MCG/ACT Inhalation Aero Soln Inhale 2 puffs into the lungs every 6 (six) hours as needed for Wheezing. (Patient not taking: Reported on 3/7/2024) 1 each 3       Allergies:  Allergies   Allergen Reactions    Seasonal          ROS:     Constitutional:  Negative for decreased activity, fever, irritability and lethargy  ENMT:  Negative for ear drainage, hearing loss and nasal drainage  Eyes:  Negative for eye discharge and vision loss  Cardiovascular:  Negative for chest pain, sobs  Respiratory:  Negative for cough, dyspnea and wheezing  Gastrointestinal:  Negative for abdominal pain, constipation  Genitourinary:  Negative for dysuria and hematuria  Endocrine:  Negative for abnormal sleep patterns, increased activity  Hema/Lymph:  Negative for easy bleeding and easy bruising  Integumentary:  Negative for pruritus and rash  Musculoskeletal:  Negative for bone/joint symptoms  Neurological:  Negative for gait disturbance  Psychiatric:  Negative for inappropriate interaction and psychiatric symptoms      Vitals:    03/07/24 1348   BP: (!) 176/74   Pulse: 68     Wt Readings from Last 6 Encounters:   03/07/24 163 lb (73.9 kg)   01/23/24 167 lb (75.8 kg)   11/15/23 163 lb 9.6 oz (74.2 kg)   09/18/23 165 lb (74.8 kg)   08/23/23 166 lb (75.3 kg)   03/21/23 165 lb (74.8 kg)         PHYSICAL EXAM:     Constitutional: appears well  hydrated alert and responsive no acute distress noted  Head/Face: normocephalic  Eyes/Vision: normal extraocular motion is intact  Nose/Mouth/Throat:mucous membranes are moist   Neck/Thyroid: neck is supple   Skin/Hair: no unusual rashes present  Back/Spine: no abnormalities noted  Musculoskeletal:  no deformities  Extremities: no edema  Neurological:  Grossly normal       ASSESSMENT/PLAN:     1. PKD: CKD stage III:   - renal US concern of polycystic kidney disease - no known FH of CKD or RRT   - Creatinine in 2/2018 was1.37 mg/dl with an eGFR 41 ml/min. stable around 1.5-1.6 mg/dl with an eGFR 32-36 ml/min   - state son might be interested in donating the kidney when needed   - UA, urine albumin and protein unremarkable    - avoid nephrotoxins   - renal US showed enlarged kidneys - right 17 cm and left 18 cm with bilateral innumerable cysts concern for polycystic kidney disease   - defer jynarque given unlikely to be familial origin and also copay very high   - counseled to have her children do urine, blood and US testing for PKD   - PTH mildly up at 123 - vitamin D wnl- Resume vitamin D3 at 2000 units   - drinks >2 liters of water a day     2. HTN:   - home BP in 120-130s   - on lisinopril 40 mg and metoprolol 50 mg  - state was on metoprolol at 150 mg and made her dizzy   - Na in normal range   - White coat hypertension     Follow up in 6 months.     Lab tests prior to next visit        Orders This Visit:  Orders Placed This Encounter   Procedures    Basic Metabolic Panel (8)    Protein/Creatinine Ratio, Urine Random       Meds This Visit:  Requested Prescriptions      No prescriptions requested or ordered in this encounter       Imaging & Referrals:  None     3/7/2024  Dex Davis MD

## 2024-03-08 ENCOUNTER — LAB ENCOUNTER (OUTPATIENT)
Dept: LAB | Age: 74
End: 2024-03-08
Attending: INTERNAL MEDICINE
Payer: MEDICARE

## 2024-03-08 DIAGNOSIS — N18.31 STAGE 3A CHRONIC KIDNEY DISEASE (HCC): ICD-10-CM

## 2024-03-08 DIAGNOSIS — Q61.3 POLYCYSTIC KIDNEY DISEASE: ICD-10-CM

## 2024-03-08 DIAGNOSIS — I10 ESSENTIAL HYPERTENSION: ICD-10-CM

## 2024-03-08 LAB
ANION GAP SERPL CALC-SCNC: 1 MMOL/L (ref 0–18)
BUN BLD-MCNC: 30 MG/DL (ref 9–23)
BUN/CREAT SERPL: 19.6 (ref 10–20)
CALCIUM BLD-MCNC: 10.4 MG/DL (ref 8.7–10.4)
CHLORIDE SERPL-SCNC: 108 MMOL/L (ref 98–112)
CO2 SERPL-SCNC: 27 MMOL/L (ref 21–32)
CREAT BLD-MCNC: 1.53 MG/DL
CREAT UR-SCNC: 103.4 MG/DL
EGFRCR SERPLBLD CKD-EPI 2021: 36 ML/MIN/1.73M2 (ref 60–?)
FASTING STATUS PATIENT QL REPORTED: YES
GLUCOSE BLD-MCNC: 97 MG/DL (ref 70–99)
OSMOLALITY SERPL CALC.SUM OF ELEC: 288 MOSM/KG (ref 275–295)
POTASSIUM SERPL-SCNC: 5 MMOL/L (ref 3.5–5.1)
PROT UR-MCNC: 19 MG/DL (ref ?–14)
PROT/CREAT UR-RTO: 0.18
SODIUM SERPL-SCNC: 136 MMOL/L (ref 136–145)

## 2024-03-08 PROCEDURE — 84156 ASSAY OF PROTEIN URINE: CPT

## 2024-03-08 PROCEDURE — 36415 COLL VENOUS BLD VENIPUNCTURE: CPT

## 2024-03-08 PROCEDURE — 82570 ASSAY OF URINE CREATININE: CPT

## 2024-03-08 PROCEDURE — 80048 BASIC METABOLIC PNL TOTAL CA: CPT

## 2024-05-14 ENCOUNTER — OFFICE VISIT (OUTPATIENT)
Dept: FAMILY MEDICINE CLINIC | Facility: CLINIC | Age: 74
End: 2024-05-14

## 2024-05-14 VITALS — WEIGHT: 159 LBS | BODY MASS INDEX: 29.26 KG/M2 | HEIGHT: 62 IN

## 2024-05-14 DIAGNOSIS — Z12.31 ENCOUNTER FOR SCREENING MAMMOGRAM FOR BREAST CANCER: Primary | ICD-10-CM

## 2024-05-14 PROCEDURE — 1160F RVW MEDS BY RX/DR IN RCRD: CPT | Performed by: FAMILY MEDICINE

## 2024-05-14 PROCEDURE — 1159F MED LIST DOCD IN RCRD: CPT | Performed by: FAMILY MEDICINE

## 2024-05-14 PROCEDURE — 3008F BODY MASS INDEX DOCD: CPT | Performed by: FAMILY MEDICINE

## 2024-06-24 ENCOUNTER — HOSPITAL ENCOUNTER (OUTPATIENT)
Dept: MAMMOGRAPHY | Age: 74
Discharge: HOME | End: 2024-06-24
Attending: FAMILY MEDICINE

## 2024-06-24 DIAGNOSIS — Z12.31 ENCOUNTER FOR SCREENING MAMMOGRAM FOR BREAST CANCER: ICD-10-CM

## 2024-06-24 PROCEDURE — 77063 BREAST TOMOSYNTHESIS BI: CPT | Performed by: FAMILY MEDICINE

## 2024-06-24 PROCEDURE — 77067 SCR MAMMO BI INCL CAD: CPT | Performed by: FAMILY MEDICINE

## 2024-08-05 ENCOUNTER — OFFICE VISIT (OUTPATIENT)
Dept: FAMILY MEDICINE CLINIC | Facility: CLINIC | Age: 74
End: 2024-08-05
Payer: MEDICARE

## 2024-08-05 VITALS
OXYGEN SATURATION: 99 % | WEIGHT: 158 LBS | HEART RATE: 69 BPM | SYSTOLIC BLOOD PRESSURE: 140 MMHG | DIASTOLIC BLOOD PRESSURE: 80 MMHG | HEIGHT: 62 IN | BODY MASS INDEX: 29.08 KG/M2

## 2024-08-05 DIAGNOSIS — Q61.3 PKD (POLYCYSTIC KIDNEY DISEASE): ICD-10-CM

## 2024-08-05 DIAGNOSIS — E03.9 HYPOTHYROIDISM, UNSPECIFIED TYPE: ICD-10-CM

## 2024-08-05 DIAGNOSIS — Z12.11 COLON CANCER SCREENING: ICD-10-CM

## 2024-08-05 DIAGNOSIS — N18.32 STAGE 3B CHRONIC KIDNEY DISEASE (HCC): ICD-10-CM

## 2024-08-05 DIAGNOSIS — E78.00 HYPERCHOLESTEREMIA: ICD-10-CM

## 2024-08-05 DIAGNOSIS — I10 ESSENTIAL HYPERTENSION: Primary | ICD-10-CM

## 2024-08-05 PROCEDURE — 3008F BODY MASS INDEX DOCD: CPT | Performed by: FAMILY MEDICINE

## 2024-08-05 PROCEDURE — 3079F DIAST BP 80-89 MM HG: CPT | Performed by: FAMILY MEDICINE

## 2024-08-05 PROCEDURE — 99214 OFFICE O/P EST MOD 30 MIN: CPT | Performed by: FAMILY MEDICINE

## 2024-08-05 PROCEDURE — 1159F MED LIST DOCD IN RCRD: CPT | Performed by: FAMILY MEDICINE

## 2024-08-05 PROCEDURE — 3077F SYST BP >= 140 MM HG: CPT | Performed by: FAMILY MEDICINE

## 2024-08-05 PROCEDURE — 1160F RVW MEDS BY RX/DR IN RCRD: CPT | Performed by: FAMILY MEDICINE

## 2024-08-05 RX ORDER — LISINOPRIL 40 MG/1
40 TABLET ORAL DAILY
Qty: 90 TABLET | Refills: 3 | Status: SHIPPED | OUTPATIENT
Start: 2024-08-05

## 2024-08-05 RX ORDER — ATORVASTATIN CALCIUM 10 MG/1
10 TABLET, FILM COATED ORAL DAILY
Qty: 90 TABLET | Refills: 3 | Status: SHIPPED | OUTPATIENT
Start: 2024-08-05

## 2024-08-05 RX ORDER — LEVOTHYROXINE SODIUM 0.05 MG/1
50 TABLET ORAL
Qty: 90 TABLET | Refills: 3 | Status: SHIPPED | OUTPATIENT
Start: 2024-08-05

## 2024-08-05 NOTE — PROGRESS NOTES
HPI:    Patient ID: Radha Wells is a 73 year old female.      Hypertension  Pertinent negatives include no chest pain, headaches, palpitations or shortness of breath.       Chief Complaint   Patient presents with    Hypertension     Follow up          Wt Readings from Last 6 Encounters:   08/05/24 158 lb (71.7 kg)   05/14/24 159 lb (72.1 kg)   03/07/24 163 lb (73.9 kg)   01/23/24 167 lb (75.8 kg)   11/15/23 163 lb 9.6 oz (74.2 kg)   09/18/23 165 lb (74.8 kg)     BP Readings from Last 3 Encounters:   08/05/24 140/80   03/07/24 (!) 176/74   01/23/24 138/80     Feels stable       Review of Systems   Constitutional:  Negative for chills and fever.   Eyes:  Negative for visual disturbance.   Respiratory:  Negative for cough, shortness of breath and wheezing.    Cardiovascular:  Negative for chest pain, palpitations and leg swelling.   Genitourinary:  Negative for decreased urine volume and difficulty urinating.   Neurological:  Negative for dizziness, tremors, seizures, weakness, light-headedness, numbness and headaches.       /80   Pulse 69   Ht 5' 2\" (1.575 m)   Wt 158 lb (71.7 kg)   SpO2 99%   BMI 28.90 kg/m²          Current Outpatient Medications   Medication Sig Dispense Refill    atorvastatin 10 MG Oral Tab Take 1 tablet (10 mg total) by mouth daily. 90 tablet 3    levothyroxine 50 MCG Oral Tab Take 1 tablet (50 mcg total) by mouth before breakfast. 90 tablet 3    lisinopril 40 MG Oral Tab Take 1 tablet (40 mg total) by mouth daily. 90 tablet 3    metoprolol succinate ER 50 MG Oral Tablet 24 Hr Take 1 tablet (50 mg total) by mouth daily. 90 tablet 3    albuterol (PROAIR HFA) 108 (90 Base) MCG/ACT Inhalation Aero Soln Inhale 2 puffs into the lungs every 6 (six) hours as needed for Wheezing. 1 each 3     Allergies:  Allergies   Allergen Reactions    Seasonal       PHYSICAL EXAM:     Chief Complaint   Patient presents with    Hypertension     Follow up         Physical Exam  Vitals and nursing note  reviewed.   Constitutional:       Appearance: She is well-developed.   Eyes:      Pupils: Pupils are equal, round, and reactive to light.   Neck:      Thyroid: No thyromegaly.   Cardiovascular:      Rate and Rhythm: Normal rate and regular rhythm.      Heart sounds: No murmur heard.  Pulmonary:      Effort: Pulmonary effort is normal.      Breath sounds: Normal breath sounds. No wheezing.   Abdominal:      Palpations: There is no mass.      Tenderness: There is no abdominal tenderness. There is no right CVA tenderness or left CVA tenderness.   Musculoskeletal:      Cervical back: Normal range of motion and neck supple.      Right lower leg: No edema.      Left lower leg: No edema.   Skin:     General: Skin is warm and dry.      Findings: No rash.   Neurological:      Mental Status: She is alert and oriented to person, place, and time.                ASSESSMENT/PLAN:     Encounter Diagnoses   Name Primary?    Essential hypertension Yes    PKD (polycystic kidney disease)     Stage 3b chronic kidney disease (HCC)     Hypercholesteremia     Hypothyroidism, unspecified type     Colon cancer screening        1. Essential hypertension  Stable condition  Reviewed medications  Continue current medication management   All questions answered to the best of my ability.    - Renal Function Panel [E]; Future  - lisinopril 40 MG Oral Tab; Take 1 tablet (40 mg total) by mouth daily.  Dispense: 90 tablet; Refill: 3    2. PKD (polycystic kidney disease)  Continue follow up with nephrology    3. Stage 3b chronic kidney disease (HCC)  Continue follow up nephrology  - Renal Function Panel [E]; Future    4. Hypercholesteremia  Stable condition  Reviewed medications  Continue current medication management   All questions answered to the best of my ability.    - Lipid Panel; Future  - AST (SGOT); Future  - ALT(SGPT); Future  - atorvastatin 10 MG Oral Tab; Take 1 tablet (10 mg total) by mouth daily.  Dispense: 90 tablet; Refill: 3    5.  Hypothyroidism, unspecified type  Stable condition  Reviewed medications  Continue current medication management   All questions answered to the best of my ability.    - TSH W Reflex To Free T4; Future  - levothyroxine 50 MCG Oral Tab; Take 1 tablet (50 mcg total) by mouth before breakfast.  Dispense: 90 tablet; Refill: 3    6. Colon cancer screening    - Occult Blood, Fecal, FIT Immunoassay; Future      Orders Placed This Encounter   Procedures    Renal Function Panel [E]    TSH W Reflex To Free T4    Lipid Panel    AST (SGOT)    ALT(SGPT)    Occult Blood, Fecal, FIT Immunoassay         The above note was creating using Dragon speech recognition technology. Please excuse any typos    Meds This Visit:  Requested Prescriptions     Signed Prescriptions Disp Refills    atorvastatin 10 MG Oral Tab 90 tablet 3     Sig: Take 1 tablet (10 mg total) by mouth daily.    levothyroxine 50 MCG Oral Tab 90 tablet 3     Sig: Take 1 tablet (50 mcg total) by mouth before breakfast.    lisinopril 40 MG Oral Tab 90 tablet 3     Sig: Take 1 tablet (40 mg total) by mouth daily.       Imaging & Referrals:  None       ID#[id number]

## 2024-08-06 ENCOUNTER — LAB ENCOUNTER (OUTPATIENT)
Dept: LAB | Age: 74
End: 2024-08-06
Attending: FAMILY MEDICINE
Payer: MEDICARE

## 2024-08-06 DIAGNOSIS — N18.32 STAGE 3B CHRONIC KIDNEY DISEASE (HCC): ICD-10-CM

## 2024-08-06 DIAGNOSIS — E03.9 HYPOTHYROIDISM, UNSPECIFIED TYPE: ICD-10-CM

## 2024-08-06 DIAGNOSIS — E78.00 HYPERCHOLESTEREMIA: ICD-10-CM

## 2024-08-06 DIAGNOSIS — I10 ESSENTIAL HYPERTENSION: ICD-10-CM

## 2024-08-06 LAB
ALBUMIN SERPL-MCNC: 4.3 G/DL (ref 3.2–4.8)
ALT SERPL-CCNC: 9 U/L
ANION GAP SERPL CALC-SCNC: 7 MMOL/L (ref 0–18)
AST SERPL-CCNC: 19 U/L (ref ?–34)
BUN BLD-MCNC: 47 MG/DL (ref 9–23)
BUN/CREAT SERPL: 28.3 (ref 10–20)
CALCIUM BLD-MCNC: 10 MG/DL (ref 8.7–10.4)
CHLORIDE SERPL-SCNC: 106 MMOL/L (ref 98–112)
CHOLEST SERPL-MCNC: 149 MG/DL (ref ?–200)
CO2 SERPL-SCNC: 25 MMOL/L (ref 21–32)
CREAT BLD-MCNC: 1.66 MG/DL
EGFRCR SERPLBLD CKD-EPI 2021: 33 ML/MIN/1.73M2 (ref 60–?)
FASTING PATIENT LIPID ANSWER: YES
GLUCOSE BLD-MCNC: 101 MG/DL (ref 70–99)
HDLC SERPL-MCNC: 58 MG/DL (ref 40–59)
LDLC SERPL CALC-MCNC: 76 MG/DL (ref ?–100)
NONHDLC SERPL-MCNC: 91 MG/DL (ref ?–130)
OSMOLALITY SERPL CALC.SUM OF ELEC: 298 MOSM/KG (ref 275–295)
PHOSPHATE SERPL-MCNC: 3.8 MG/DL (ref 2.4–5.1)
POTASSIUM SERPL-SCNC: 4.6 MMOL/L (ref 3.5–5.1)
SODIUM SERPL-SCNC: 138 MMOL/L (ref 136–145)
TRIGL SERPL-MCNC: 76 MG/DL (ref 30–149)
TSI SER-ACNC: 1.23 MIU/ML (ref 0.55–4.78)
VLDLC SERPL CALC-MCNC: 12 MG/DL (ref 0–30)

## 2024-08-06 PROCEDURE — 36415 COLL VENOUS BLD VENIPUNCTURE: CPT

## 2024-08-06 PROCEDURE — 84443 ASSAY THYROID STIM HORMONE: CPT

## 2024-08-06 PROCEDURE — 84460 ALANINE AMINO (ALT) (SGPT): CPT

## 2024-08-06 PROCEDURE — 84450 TRANSFERASE (AST) (SGOT): CPT

## 2024-08-06 PROCEDURE — 80069 RENAL FUNCTION PANEL: CPT

## 2024-08-06 PROCEDURE — 80061 LIPID PANEL: CPT

## 2024-08-07 ENCOUNTER — LAB ENCOUNTER (OUTPATIENT)
Dept: LAB | Age: 74
End: 2024-08-07
Attending: FAMILY MEDICINE
Payer: MEDICARE

## 2024-11-19 ENCOUNTER — OFFICE VISIT (OUTPATIENT)
Dept: NEPHROLOGY | Facility: CLINIC | Age: 74
End: 2024-11-19
Payer: MEDICARE

## 2024-11-19 VITALS
SYSTOLIC BLOOD PRESSURE: 154 MMHG | WEIGHT: 151 LBS | HEART RATE: 74 BPM | DIASTOLIC BLOOD PRESSURE: 78 MMHG | BODY MASS INDEX: 28 KG/M2

## 2024-11-19 DIAGNOSIS — Q61.3 POLYCYSTIC KIDNEY DISEASE: ICD-10-CM

## 2024-11-19 DIAGNOSIS — N18.31 STAGE 3A CHRONIC KIDNEY DISEASE (HCC): Primary | ICD-10-CM

## 2024-11-19 PROCEDURE — 3077F SYST BP >= 140 MM HG: CPT | Performed by: INTERNAL MEDICINE

## 2024-11-19 PROCEDURE — 3078F DIAST BP <80 MM HG: CPT | Performed by: INTERNAL MEDICINE

## 2024-11-19 PROCEDURE — 99214 OFFICE O/P EST MOD 30 MIN: CPT | Performed by: INTERNAL MEDICINE

## 2024-11-19 PROCEDURE — 1126F AMNT PAIN NOTED NONE PRSNT: CPT | Performed by: INTERNAL MEDICINE

## 2024-11-19 PROCEDURE — 1159F MED LIST DOCD IN RCRD: CPT | Performed by: INTERNAL MEDICINE

## 2024-11-19 PROCEDURE — 1111F DSCHRG MED/CURRENT MED MERGE: CPT | Performed by: INTERNAL MEDICINE

## 2024-11-19 NOTE — PROGRESS NOTES
Progress Note:      Patient is a 74 yrs old female with pmh of CKD stage III, polycystic kidney disease, HTN x 20 yrs, Hypothyroidism, Asthma who presented for follow up     Started following with Dr. Cardoza in 2018. Recent Lab test showed BUN/Cr 40/1.4 mg/dl with an eGFR 39 ml/min. Albumin and calcium wnl. TSH wnl. Creatinine from 2018 1.37 mg/dl with an eGFR 41 ml/min. Hgb 11.5 g/dl    Both parents .  at age of 70 and 80s. No known kidney disease in parents. One sister and 2 brother  of cancer and CAD respectively. Other siblings have cancer , alzheimers but no known kidney disease.     Patient has 3 kids - 42 yrs and 46 yrs daughter. And 40 yrs old son with no known kidney issues     Takes OTC NSAIDs once in great while. No herbal supplement. No urinary complaints    Non smoker, no alcohol. No FH of kidney disease. Drink plenty of water. Retired recently. No new complaints    Drinking 65-75 ounces of liquids.     Home blood pressure are in 130s range. No leg swelling or urinary complaints. Goes to gym daily and lost 12 lbs     HISTORY:  Past Medical History:    Allergic rhinitis    Asthma (HCC)    Essential hypertension    Hypothyroidism      Past Surgical History:   Procedure Laterality Date    Colposcopy, cervix w/upper adjacent vagina; w/biopsy(s), cervix            3    Tubal ligation        Family History   Problem Relation Age of Onset    Hypertension Father     Hypertension Mother     Heart Disorder Mother     Ovarian Cancer Sister 55    Breast Cancer Sister 57    Diabetes Neg       Social History:   Social History     Socioeconomic History    Marital status:    Tobacco Use    Smoking status: Never    Smokeless tobacco: Never   Vaping Use    Vaping status: Never Used   Substance and Sexual Activity    Alcohol use: No    Drug use: No        Medications (Active prior to today's visit):  Current Outpatient Medications   Medication Sig Dispense Refill    atorvastatin 10 MG Oral Tab  Take 1 tablet (10 mg total) by mouth daily. 90 tablet 3    levothyroxine 50 MCG Oral Tab Take 1 tablet (50 mcg total) by mouth before breakfast. 90 tablet 3    lisinopril 40 MG Oral Tab Take 1 tablet (40 mg total) by mouth daily. 90 tablet 3    metoprolol succinate ER 50 MG Oral Tablet 24 Hr Take 1 tablet (50 mg total) by mouth daily. 90 tablet 3    albuterol (PROAIR HFA) 108 (90 Base) MCG/ACT Inhalation Aero Soln Inhale 2 puffs into the lungs every 6 (six) hours as needed for Wheezing. 1 each 3       Allergies:  Allergies   Allergen Reactions    Seasonal          ROS:     Constitutional:  Negative for decreased activity, fever, irritability and lethargy  ENMT:  Negative for ear drainage, hearing loss and nasal drainage  Eyes:  Negative for eye discharge and vision loss  Cardiovascular:  Negative for chest pain, sobs  Respiratory:  Negative for cough, dyspnea and wheezing  Gastrointestinal:  Negative for abdominal pain, constipation  Genitourinary:  Negative for dysuria and hematuria  Endocrine:  Negative for abnormal sleep patterns, increased activity  Hema/Lymph:  Negative for easy bleeding and easy bruising  Integumentary:  Negative for pruritus and rash  Musculoskeletal:  Negative for bone/joint symptoms  Neurological:  Negative for gait disturbance  Psychiatric:  Negative for inappropriate interaction and psychiatric symptoms      Vitals:    11/19/24 1152   BP: 154/78   Pulse:      Wt Readings from Last 6 Encounters:   11/19/24 151 lb (68.5 kg)   08/05/24 158 lb (71.7 kg)   05/14/24 159 lb (72.1 kg)   03/07/24 163 lb (73.9 kg)   01/23/24 167 lb (75.8 kg)   11/15/23 163 lb 9.6 oz (74.2 kg)         PHYSICAL EXAM:     Constitutional: appears well hydrated alert and responsive no acute distress noted  Head/Face: normocephalic  Eyes/Vision: normal extraocular motion is intact  Nose/Mouth/Throat:mucous membranes are moist   Neck/Thyroid: neck is supple   Skin/Hair: no unusual rashes present  Back/Spine: no  abnormalities noted  Musculoskeletal:  no deformities  Extremities: no edema  Neurological:  Grossly normal       ASSESSMENT/PLAN:     1. PKD: CKD stage III:   - renal US concern of polycystic kidney disease - no known FH of CKD or RRT   - Creatinine in 2/2018 was1.37 mg/dl with an eGFR 41 ml/min. stable around 1.5-1.6 mg/dl with an eGFR 32-36 ml/min   - state son might be interested in donating the kidney when needed   - UA, urine albumin and protein unremarkable    - avoid nephrotoxins   - renal US showed enlarged kidneys - right 17 cm and left 18 cm with bilateral innumerable cysts concern for polycystic kidney disease   - defer jynarque given unlikely to be familial origin and also copay very high   - counseled to have her children do urine, blood and US testing for PKD   - PTH mildly up at 123 - vitamin D wnl- Resume vitamin D3 at 2000 units   - drinks >2 liters of water a day     2. HTN:   - home BP in 120-130s   - on lisinopril 40 mg and metoprolol 50 mg  - state was on metoprolol at 150 mg and made her dizzy   - Na in normal range   - White coat hypertension     Follow up in 6 months.     Lab tests next 2-3 days       Orders This Visit:  Orders Placed This Encounter   Procedures    Renal Function Panel    Protein/Creatinine Ratio, Urine Random       Meds This Visit:  Requested Prescriptions      No prescriptions requested or ordered in this encounter       Imaging & Referrals:  None     11/19/2024  Dex Davis MD

## 2024-11-20 ENCOUNTER — LAB ENCOUNTER (OUTPATIENT)
Dept: LAB | Age: 74
End: 2024-11-20
Attending: INTERNAL MEDICINE
Payer: MEDICARE

## 2024-11-20 DIAGNOSIS — Q61.3 POLYCYSTIC KIDNEY DISEASE: ICD-10-CM

## 2024-11-20 DIAGNOSIS — N18.31 STAGE 3A CHRONIC KIDNEY DISEASE (HCC): ICD-10-CM

## 2024-11-20 LAB
ALBUMIN SERPL-MCNC: 4.2 G/DL (ref 3.2–4.8)
ANION GAP SERPL CALC-SCNC: 7 MMOL/L (ref 0–18)
BUN BLD-MCNC: 29 MG/DL (ref 9–23)
BUN/CREAT SERPL: 18.6 (ref 10–20)
CALCIUM BLD-MCNC: 10.5 MG/DL (ref 8.7–10.4)
CHLORIDE SERPL-SCNC: 107 MMOL/L (ref 98–112)
CO2 SERPL-SCNC: 27 MMOL/L (ref 21–32)
CREAT BLD-MCNC: 1.56 MG/DL
CREAT UR-SCNC: 100 MG/DL
EGFRCR SERPLBLD CKD-EPI 2021: 35 ML/MIN/1.73M2 (ref 60–?)
GLUCOSE BLD-MCNC: 96 MG/DL (ref 70–99)
OSMOLALITY SERPL CALC.SUM OF ELEC: 298 MOSM/KG (ref 275–295)
PHOSPHATE SERPL-MCNC: 3.1 MG/DL (ref 2.4–5.1)
POTASSIUM SERPL-SCNC: 4.4 MMOL/L (ref 3.5–5.1)
PROT UR-MCNC: 17 MG/DL (ref ?–14)
PROT/CREAT UR-RTO: 0.17
SODIUM SERPL-SCNC: 141 MMOL/L (ref 136–145)

## 2024-11-20 PROCEDURE — 82570 ASSAY OF URINE CREATININE: CPT

## 2024-11-20 PROCEDURE — 80069 RENAL FUNCTION PANEL: CPT

## 2024-11-20 PROCEDURE — 84156 ASSAY OF PROTEIN URINE: CPT

## 2024-11-20 PROCEDURE — 36415 COLL VENOUS BLD VENIPUNCTURE: CPT

## 2024-12-04 ENCOUNTER — TELEPHONE (OUTPATIENT)
Dept: NEPHROLOGY | Facility: CLINIC | Age: 74
End: 2024-12-04

## 2024-12-04 DIAGNOSIS — E83.52 HYPERCALCEMIA: Primary | ICD-10-CM

## 2024-12-04 NOTE — TELEPHONE ENCOUNTER
Stable kidney function . Urine protein unremarkable    Elevated calcium - pls find out if patient taking any tums, calcium supplement OTC or vitamin D supplement

## 2024-12-09 NOTE — TELEPHONE ENCOUNTER
Please have patient do the labs as ordered     Have her schedule follow up appointment in April

## 2024-12-09 NOTE — TELEPHONE ENCOUNTER
Reji Yang, Called and spoke to patient, informed her Dr. Reji Davis, has reviewed your lab results, stable kidney function, urine protein unremarkable. Elevated calcium.    Patient reports she takes Vitamin D 2000 international units every day. No other vitamin or supplement, and no tums. Would you like patient to stop taking the Vitamin D. Thank you.

## 2024-12-09 NOTE — TELEPHONE ENCOUNTER
Informed patient of note below  and last name verified,pt verbalized understanding.Rn generated order for labs.,pt scheduled .

## 2024-12-10 ENCOUNTER — LAB ENCOUNTER (OUTPATIENT)
Dept: LAB | Age: 74
End: 2024-12-10
Attending: INTERNAL MEDICINE
Payer: MEDICARE

## 2024-12-10 DIAGNOSIS — E83.52 HYPERCALCEMIA: ICD-10-CM

## 2024-12-10 LAB — PTH-INTACT SERPL-MCNC: 124.6 PG/ML (ref 18.5–88)

## 2024-12-10 PROCEDURE — 36415 COLL VENOUS BLD VENIPUNCTURE: CPT

## 2024-12-10 PROCEDURE — 83970 ASSAY OF PARATHORMONE: CPT

## 2024-12-10 PROCEDURE — 84165 PROTEIN E-PHORESIS SERUM: CPT

## 2024-12-12 LAB
ALBUMIN SERPL ELPH-MCNC: 3.89 G/DL (ref 3.75–5.21)
ALBUMIN/GLOB SERPL: 1.38 {RATIO} (ref 1–2)
ALPHA1 GLOB SERPL ELPH-MCNC: 0.29 G/DL (ref 0.19–0.46)
ALPHA2 GLOB SERPL ELPH-MCNC: 0.75 G/DL (ref 0.48–1.05)
B-GLOBULIN SERPL ELPH-MCNC: 0.76 G/DL (ref 0.68–1.23)
GAMMA GLOB SERPL ELPH-MCNC: 1.02 G/DL (ref 0.62–1.7)
PROT SERPL-MCNC: 6.7 G/DL (ref 5.7–8.2)

## 2024-12-13 ENCOUNTER — HOSPITAL ENCOUNTER (EMERGENCY)
Facility: HOSPITAL | Age: 74
Discharge: HOME | End: 2024-12-13
Attending: STUDENT IN AN ORGANIZED HEALTH CARE EDUCATION/TRAINING PROGRAM
Payer: MEDICARE

## 2024-12-13 ENCOUNTER — APPOINTMENT (OUTPATIENT)
Dept: CT IMAGING | Facility: HOSPITAL | Age: 74
End: 2024-12-13
Attending: STUDENT IN AN ORGANIZED HEALTH CARE EDUCATION/TRAINING PROGRAM
Payer: MEDICARE

## 2024-12-13 VITALS
HEART RATE: 60 BPM | WEIGHT: 169 LBS | DIASTOLIC BLOOD PRESSURE: 58 MMHG | OXYGEN SATURATION: 100 % | TEMPERATURE: 99 F | SYSTOLIC BLOOD PRESSURE: 154 MMHG | HEIGHT: 62 IN | RESPIRATION RATE: 14 BRPM | BODY MASS INDEX: 31.1 KG/M2

## 2024-12-13 DIAGNOSIS — I10 HYPERTENSION, UNSPECIFIED TYPE: ICD-10-CM

## 2024-12-13 DIAGNOSIS — I67.1 BRAIN ANEURYSM (HCC): Primary | ICD-10-CM

## 2024-12-13 LAB
ALBUMIN SERPL-MCNC: 4.5 G/DL (ref 3.2–4.8)
ALBUMIN/GLOB SERPL: 1.8 {RATIO} (ref 1–2)
ALP LIVER SERPL-CCNC: 82 U/L
ALT SERPL-CCNC: 9 U/L
ANION GAP SERPL CALC-SCNC: 7 MMOL/L (ref 0–18)
AST SERPL-CCNC: 22 U/L (ref ?–34)
BASOPHILS # BLD AUTO: 0.05 X10(3) UL (ref 0–0.2)
BASOPHILS NFR BLD AUTO: 0.7 %
BILIRUB SERPL-MCNC: 0.5 MG/DL (ref 0.2–1.1)
BUN BLD-MCNC: 35 MG/DL (ref 9–23)
BUN/CREAT SERPL: 21.1 (ref 10–20)
CALCIUM BLD-MCNC: 10.4 MG/DL (ref 8.7–10.4)
CHLORIDE SERPL-SCNC: 102 MMOL/L (ref 98–112)
CO2 SERPL-SCNC: 24 MMOL/L (ref 21–32)
CREAT BLD-MCNC: 1.66 MG/DL
DEPRECATED RDW RBC AUTO: 42.6 FL (ref 35.1–46.3)
EGFRCR SERPLBLD CKD-EPI 2021: 33 ML/MIN/1.73M2 (ref 60–?)
EOSINOPHIL # BLD AUTO: 0.17 X10(3) UL (ref 0–0.7)
EOSINOPHIL NFR BLD AUTO: 2.3 %
ERYTHROCYTE [DISTWIDTH] IN BLOOD BY AUTOMATED COUNT: 13.4 % (ref 11–15)
GLOBULIN PLAS-MCNC: 2.5 G/DL (ref 2–3.5)
GLUCOSE BLD-MCNC: 102 MG/DL (ref 70–99)
HCT VFR BLD AUTO: 34.6 %
HGB BLD-MCNC: 11.7 G/DL
IMM GRANULOCYTES # BLD AUTO: 0.01 X10(3) UL (ref 0–1)
IMM GRANULOCYTES NFR BLD: 0.1 %
LYMPHOCYTES # BLD AUTO: 2.7 X10(3) UL (ref 1–4)
LYMPHOCYTES NFR BLD AUTO: 37.1 %
MCH RBC QN AUTO: 29.2 PG (ref 26–34)
MCHC RBC AUTO-ENTMCNC: 33.8 G/DL (ref 31–37)
MCV RBC AUTO: 86.3 FL
MONOCYTES # BLD AUTO: 0.37 X10(3) UL (ref 0.1–1)
MONOCYTES NFR BLD AUTO: 5.1 %
NEUTROPHILS # BLD AUTO: 3.98 X10 (3) UL (ref 1.5–7.7)
NEUTROPHILS # BLD AUTO: 3.98 X10(3) UL (ref 1.5–7.7)
NEUTROPHILS NFR BLD AUTO: 54.7 %
OSMOLALITY SERPL CALC.SUM OF ELEC: 284 MOSM/KG (ref 275–295)
PLATELET # BLD AUTO: 142 10(3)UL (ref 150–450)
POTASSIUM SERPL-SCNC: 4.6 MMOL/L (ref 3.5–5.1)
PROT SERPL-MCNC: 7 G/DL (ref 5.7–8.2)
RBC # BLD AUTO: 4.01 X10(6)UL
SODIUM SERPL-SCNC: 133 MMOL/L (ref 136–145)
WBC # BLD AUTO: 7.3 X10(3) UL (ref 4–11)

## 2024-12-13 PROCEDURE — 85025 COMPLETE CBC W/AUTO DIFF WBC: CPT | Performed by: STUDENT IN AN ORGANIZED HEALTH CARE EDUCATION/TRAINING PROGRAM

## 2024-12-13 PROCEDURE — 93005 ELECTROCARDIOGRAM TRACING: CPT

## 2024-12-13 PROCEDURE — 36415 COLL VENOUS BLD VENIPUNCTURE: CPT

## 2024-12-13 PROCEDURE — 99284 EMERGENCY DEPT VISIT MOD MDM: CPT

## 2024-12-13 PROCEDURE — 70496 CT ANGIOGRAPHY HEAD: CPT | Performed by: STUDENT IN AN ORGANIZED HEALTH CARE EDUCATION/TRAINING PROGRAM

## 2024-12-13 PROCEDURE — 93010 ELECTROCARDIOGRAM REPORT: CPT

## 2024-12-13 PROCEDURE — 99285 EMERGENCY DEPT VISIT HI MDM: CPT

## 2024-12-13 PROCEDURE — 80053 COMPREHEN METABOLIC PANEL: CPT | Performed by: STUDENT IN AN ORGANIZED HEALTH CARE EDUCATION/TRAINING PROGRAM

## 2024-12-13 RX ORDER — AMLODIPINE BESYLATE 5 MG/1
5 TABLET ORAL DAILY
Qty: 14 TABLET | Refills: 0 | Status: SHIPPED | OUTPATIENT
Start: 2024-12-13 | End: 2024-12-16

## 2024-12-13 RX ORDER — AMLODIPINE BESYLATE 5 MG/1
5 TABLET ORAL ONCE
Status: COMPLETED | OUTPATIENT
Start: 2024-12-13 | End: 2024-12-13

## 2024-12-13 NOTE — ED INITIAL ASSESSMENT (HPI)
PATIENT IS HERE WITH HIGH BP-HIGHEST SYSTOLIC 189 X 2 WEEKS. PATIENT HAS HYPERTENSION & IS TAKING MEDICATIONS FOR BP. PATIENT STATES HAVING INTERMITTENT PULSATING SENSATION IN HER HEAD. DENIES HEADACHE. DENIES BLURRY VISION.   PATIENT STATES HAVING AN APPOINTMENT WITH HER MD ON MONDAY.

## 2024-12-14 LAB
ATRIAL RATE: 77 BPM
P AXIS: 66 DEGREES
P-R INTERVAL: 178 MS
Q-T INTERVAL: 398 MS
QRS DURATION: 98 MS
QTC CALCULATION (BEZET): 450 MS
R AXIS: 79 DEGREES
T AXIS: 62 DEGREES
VENTRICULAR RATE: 77 BPM

## 2024-12-14 NOTE — ED PROVIDER NOTES
History     Chief Complaint   Patient presents with    Hypertension       HPI    74 year old female with history of hypertension, chronic kidney disease presents with high blood pressure.  For the past couple weeks patient states that her blood pressure intermittently is elevated as high as 170s, but at other times it is as low was 1 teens/120s.   Patient is compliant with her lisinopril and metoprolol, she was previously on 100 mg of metoprolol but it made her dizzy so her doctor brought her down to 50 mg.  She has no chest pain or shortness of breath.  At baseline ET.  She does note that sometimes she has a whooshing sensation in her head but has no headache or vision changes.        Past Medical History:    Allergic rhinitis    Asthma (HCC)    Essential hypertension    Hypothyroidism       Past Surgical History:   Procedure Laterality Date    Colposcopy, cervix w/upper adjacent vagina; w/biopsy(s), cervix            3    Tubal ligation         Social History     Socioeconomic History    Marital status:    Tobacco Use    Smoking status: Never    Smokeless tobacco: Never   Vaping Use    Vaping status: Never Used   Substance and Sexual Activity    Alcohol use: No    Drug use: No                   Physical Exam     ED Triage Vitals [24 1648]   BP (S) (!) 193/70   Pulse 79   Resp 20   Temp 98.7 °F (37.1 °C)   Temp src Temporal   SpO2 100 %   O2 Device None (Room air)       Physical Exam  Constitutional:       General: She is not in acute distress.  Eyes:      Extraocular Movements: Extraocular movements intact.   Cardiovascular:      Rate and Rhythm: Normal rate and regular rhythm.      Pulses: Normal pulses.      Heart sounds: Normal heart sounds.   Pulmonary:      Effort: Pulmonary effort is normal. No respiratory distress.      Breath sounds: Normal breath sounds.   Musculoskeletal:      Cervical back: Normal range of motion.   Neurological:      General: No focal deficit present.      Mental  Status: She is alert.              ED Course     Labs Reviewed   CBC WITH DIFFERENTIAL WITH PLATELET - Abnormal; Notable for the following components:       Result Value    HGB 11.7 (*)     HCT 34.6 (*)     .0 (*)     All other components within normal limits   COMP METABOLIC PANEL (14) - Abnormal; Notable for the following components:    Glucose 102 (*)     Sodium 133 (*)     BUN 35 (*)     Creatinine 1.66 (*)     BUN/CREA Ratio 21.1 (*)     eGFR-Cr 33 (*)     ALT 9 (*)     All other components within normal limits   RAINBOW DRAW LAVENDER   RAINBOW DRAW LIGHT GREEN   RAINBOW DRAW BLUE   RAINBOW DRAW GOLD     CTA BRAIN (CPT=70496)    Result Date: 12/13/2024  CONCLUSION:  1. Suspected 0.4 cm aneurysm in the interhemispheric sulcus arising from an A2 segment.   2. No acute intracranial process by noncontrast/contrast-enhanced CT technique.  3. Negative for hemodynamically significant stenosis or occlusion of the anterior or posterior circulations.  4. Contiguous patency and flow throughout the vertebral arteries bilaterally.  5. Senescent changes of parenchymal volume loss with sequela of chronic microvascular ischemic disease. There is also large vessel atherosclerosis.   6. Lesser incidental findings as above.    Dictated by (CST): Elijah Hale MD on 12/13/2024 at 7:28 PM     Finalized by (CST): Elijah Hale MD on 12/13/2024 at 7:36 PM               MDM     Vitals:    12/13/24 1648 12/13/24 1800 12/13/24 1815 12/13/24 1930   BP: (S) (!) 193/70 (!) 179/56 (!) 178/69 158/69   Pulse: 79 69 70 70   Resp: 20 15 16 15   Temp: 98.7 °F (37.1 °C)      TempSrc: Temporal      SpO2: 100% 100% 100% 100%   Weight: 76.7 kg      Height: 157.5 cm (5' 2\")          Elevated blood pressure, fluctuating throughout the day.  Blood pressure is quite elevated here.  She is mentating perfusing appropriately however.  Will check labs to assess for endorgan damage.  Whooshing sensation may not be related to blood pressure, will  check screening CT angiogram to assess for aneurysmal disease.  No clinical signs of hemorrhage.    ED Course as of 12/13/24 1948  ------------------------------------------------------------  Time: 12/13 1809  Comment: EKG interpretation by me: EKG sinus rhythm at a rate of 77, axis nomal, no concerning acute ischemic ST changes  ------------------------------------------------------------  Time: 12/13 1844  Comment: Renal function is stable for patient, labs otherwise unrevealing  ------------------------------------------------------------  Time: 12/13 1929  Comment: My interpret of cth without hemorrhage  ------------------------------------------------------------  Time: 12/13 1939  Comment: Rad noting Suspected 0.4 cm aneurysm in the interhemispheric sulcus arising from an A2 segment  ------------------------------------------------------------  Time: 12/13 1939  Comment: Patient has no clinical signs of hemorrhage at this time.  ------------------------------------------------------------  Time: 12/13 1939  Comment:   Will start patient on low-dose amlodipine in addition to her current medicines, advised patient keep a log of her blood pressures and follow-up with her primary care physician regarding her blood pressure medications.  Will give a referral to neurointerventional/neurosurgery for further workup and care regarding her aneurysm, discussed with nsg.         Disposition and Plan     Clinical Impression:  1. Brain aneurysm (HCC)    2. Hypertension, unspecified type        Disposition:  Discharge    Follow-up:  Marcell Cardoza MD  [address] [place] [address]  [phone]    Follow up      Emiliano Montiel MD  [address] [place] [address]  [phone]    Schedule an appointment as soon as possible for a visit        Medications Prescribed:  Current Discharge Medication List        START taking these medications    Details   amLODIPine 5 MG Oral Tab Take 1 tablet (5 mg total)  by mouth daily for 14 days.  Qty: 14 tablet, Refills: 0

## 2024-12-16 ENCOUNTER — OFFICE VISIT (OUTPATIENT)
Dept: FAMILY MEDICINE CLINIC | Facility: CLINIC | Age: 74
End: 2024-12-16
Payer: MEDICARE

## 2024-12-16 ENCOUNTER — PATIENT OUTREACH (OUTPATIENT)
Dept: CASE MANAGEMENT | Age: 74
End: 2024-12-16

## 2024-12-16 VITALS
TEMPERATURE: 97 F | SYSTOLIC BLOOD PRESSURE: 149 MMHG | WEIGHT: 151 LBS | BODY MASS INDEX: 27.79 KG/M2 | HEIGHT: 62 IN | DIASTOLIC BLOOD PRESSURE: 72 MMHG | HEART RATE: 73 BPM

## 2024-12-16 DIAGNOSIS — N18.32 STAGE 3B CHRONIC KIDNEY DISEASE (HCC): ICD-10-CM

## 2024-12-16 DIAGNOSIS — I10 ESSENTIAL HYPERTENSION: Primary | ICD-10-CM

## 2024-12-16 DIAGNOSIS — I67.1 INTRACRANIAL ANEURYSM (HCC): ICD-10-CM

## 2024-12-16 DIAGNOSIS — D69.6 THROMBOCYTOPENIA (HCC): ICD-10-CM

## 2024-12-16 PROBLEM — J44.89 ASTHMA WITH COPD (CHRONIC OBSTRUCTIVE PULMONARY DISEASE) (HCC): Chronic | Status: ACTIVE | Noted: 2024-12-16

## 2024-12-16 PROCEDURE — 1159F MED LIST DOCD IN RCRD: CPT | Performed by: FAMILY MEDICINE

## 2024-12-16 PROCEDURE — 3077F SYST BP >= 140 MM HG: CPT | Performed by: FAMILY MEDICINE

## 2024-12-16 PROCEDURE — 3008F BODY MASS INDEX DOCD: CPT | Performed by: FAMILY MEDICINE

## 2024-12-16 PROCEDURE — 99214 OFFICE O/P EST MOD 30 MIN: CPT | Performed by: FAMILY MEDICINE

## 2024-12-16 PROCEDURE — 1160F RVW MEDS BY RX/DR IN RCRD: CPT | Performed by: FAMILY MEDICINE

## 2024-12-16 PROCEDURE — 3078F DIAST BP <80 MM HG: CPT | Performed by: FAMILY MEDICINE

## 2024-12-16 RX ORDER — AMLODIPINE BESYLATE 5 MG/1
5 TABLET ORAL DAILY
Qty: 90 TABLET | Refills: 0 | Status: SHIPPED | OUTPATIENT
Start: 2024-12-16

## 2024-12-16 NOTE — PROGRESS NOTES
HPI:    Patient ID: Radha Wells is a 74 year old female.      HPI    Chief Complaint   Patient presents with    ER F/U     Had elevated BP and a pounding headache       Wt Readings from Last 6 Encounters:   12/16/24 151 lb (68.5 kg)   12/13/24 169 lb (76.7 kg)   11/19/24 151 lb (68.5 kg)   08/05/24 158 lb (71.7 kg)   05/14/24 159 lb (72.1 kg)   03/07/24 163 lb (73.9 kg)     BP Readings from Last 3 Encounters:   12/16/24 149/72   12/13/24 154/58   11/19/24 154/78     Bps at home 130-180s/ 60s  No chest pain   Went to ER on 12/13 for elevated at 170s systolic.  ER notes , labs and tests reviewed.      Review of Systems   Constitutional:  Negative for chills and fever.   Eyes:  Negative for visual disturbance.   Respiratory:  Negative for cough, shortness of breath and wheezing.    Cardiovascular:  Negative for chest pain, palpitations and leg swelling.   Genitourinary:  Negative for decreased urine volume and difficulty urinating.   Neurological:  Negative for dizziness, tremors, seizures, weakness, light-headedness, numbness and headaches.       /72   Pulse 73   Temp 97.1 °F (36.2 °C) (Temporal)   Ht 5' 2\" (1.575 m)   Wt 151 lb (68.5 kg)   BMI 27.62 kg/m²          Current Outpatient Medications   Medication Sig Dispense Refill    amLODIPine 5 MG Oral Tab Take 1 tablet (5 mg total) by mouth daily. 90 tablet 0    atorvastatin 10 MG Oral Tab Take 1 tablet (10 mg total) by mouth daily. 90 tablet 3    levothyroxine 50 MCG Oral Tab Take 1 tablet (50 mcg total) by mouth before breakfast. 90 tablet 3    lisinopril 40 MG Oral Tab Take 1 tablet (40 mg total) by mouth daily. 90 tablet 3    metoprolol succinate ER 50 MG Oral Tablet 24 Hr Take 1 tablet (50 mg total) by mouth daily. 90 tablet 3    albuterol (PROAIR HFA) 108 (90 Base) MCG/ACT Inhalation Aero Soln Inhale 2 puffs into the lungs every 6 (six) hours as needed for Wheezing. 1 each 3     Allergies:Allergies[1]   PHYSICAL EXAM:     Chief Complaint   Patient  presents with    ER F/U     Had elevated BP and a pounding headache      Physical Exam  Vitals and nursing note reviewed.   Constitutional:       Appearance: She is well-developed.   Eyes:      Pupils: Pupils are equal, round, and reactive to light.   Neck:      Thyroid: No thyromegaly.   Cardiovascular:      Rate and Rhythm: Normal rate and regular rhythm.      Heart sounds: No murmur heard.  Pulmonary:      Effort: Pulmonary effort is normal.      Breath sounds: Normal breath sounds. No wheezing.   Abdominal:      Palpations: There is no mass.      Tenderness: There is no abdominal tenderness. There is no right CVA tenderness or left CVA tenderness.   Musculoskeletal:      Cervical back: Normal range of motion and neck supple.      Right lower leg: No edema.      Left lower leg: No edema.   Skin:     General: Skin is warm and dry.      Findings: No rash.   Neurological:      Mental Status: She is alert and oriented to person, place, and time.                ASSESSMENT/PLAN:     Encounter Diagnoses   Name Primary?    Essential hypertension Yes    Stage 3b chronic kidney disease (HCC)     Thrombocytopenia (HCC)     Intracranial aneurysm (HCC)        1. Essential hypertension  Elevated  Has not taken amlodipine today  Resume amlodipine  Low salt diet  Follow up nephrology   Monitor bps at home  - NEPHROLOGY - INTERNAL  - amLODIPine 5 MG Oral Tab; Take 1 tablet (5 mg total) by mouth daily.  Dispense: 90 tablet; Refill: 0    2. Stage 3b chronic kidney disease (HCC)  Avoid nsaids  Stable  Follow up nephrology    3. Thrombocytopenia (HCC)  Recheck cbc 2-3 weeks  Not on any blood thinners  - CBC With Differential With Platelet; Future    4. Intracranial aneurysm (HCC)  CTA reviewed  Suspected 0.4 cm aneurysm in the interhemispheric sulcus arising from an A2 segment.     - Neurosurgery Referral - In Network      Orders Placed This Encounter   Procedures    CBC With Differential With Platelet         The above note was  creating using Dragon speech recognition technology. Please excuse any typos    Meds This Visit:  Requested Prescriptions     Signed Prescriptions Disp Refills    amLODIPine 5 MG Oral Tab 90 tablet 0     Sig: Take 1 tablet (5 mg total) by mouth daily.       Imaging & Referrals:  NEUROSURGERY - INTERNAL  NEPHROLOGY - INTERNAL       ID#[id number]       [1]   Allergies  Allergen Reactions    Seasonal

## 2024-12-16 NOTE — PROGRESS NOTES
Left message on mailbox for patient to call nurse care manager back for transitional care management/hospital follow-up call.  Nurse care  information included in message.      MyChart message sent to patient for transitional care management outreach.

## 2024-12-17 ENCOUNTER — LAB ENCOUNTER (OUTPATIENT)
Dept: LAB | Age: 74
End: 2024-12-17
Attending: FAMILY MEDICINE
Payer: MEDICARE

## 2024-12-17 DIAGNOSIS — D69.6 THROMBOCYTOPENIA (HCC): ICD-10-CM

## 2024-12-17 LAB
BASOPHILS # BLD AUTO: 0.04 X10(3) UL (ref 0–0.2)
BASOPHILS NFR BLD AUTO: 0.6 %
DEPRECATED RDW RBC AUTO: 44.5 FL (ref 35.1–46.3)
EOSINOPHIL # BLD AUTO: 0.15 X10(3) UL (ref 0–0.7)
EOSINOPHIL NFR BLD AUTO: 2.4 %
ERYTHROCYTE [DISTWIDTH] IN BLOOD BY AUTOMATED COUNT: 13.6 % (ref 11–15)
HCT VFR BLD AUTO: 36.4 %
HGB BLD-MCNC: 11.9 G/DL
IMM GRANULOCYTES # BLD AUTO: 0.01 X10(3) UL (ref 0–1)
IMM GRANULOCYTES NFR BLD: 0.2 %
LYMPHOCYTES # BLD AUTO: 2.28 X10(3) UL (ref 1–4)
LYMPHOCYTES NFR BLD AUTO: 36.1 %
MCH RBC QN AUTO: 29.1 PG (ref 26–34)
MCHC RBC AUTO-ENTMCNC: 32.7 G/DL (ref 31–37)
MCV RBC AUTO: 89 FL
MONOCYTES # BLD AUTO: 0.32 X10(3) UL (ref 0.1–1)
MONOCYTES NFR BLD AUTO: 5.1 %
NEUTROPHILS # BLD AUTO: 3.51 X10 (3) UL (ref 1.5–7.7)
NEUTROPHILS # BLD AUTO: 3.51 X10(3) UL (ref 1.5–7.7)
NEUTROPHILS NFR BLD AUTO: 55.6 %
PLATELET # BLD AUTO: 157 10(3)UL (ref 150–450)
RBC # BLD AUTO: 4.09 X10(6)UL
WBC # BLD AUTO: 6.3 X10(3) UL (ref 4–11)

## 2024-12-17 PROCEDURE — 36415 COLL VENOUS BLD VENIPUNCTURE: CPT

## 2024-12-17 PROCEDURE — 85025 COMPLETE CBC W/AUTO DIFF WBC: CPT

## 2024-12-27 ENCOUNTER — TELEPHONE (OUTPATIENT)
Dept: SURGERY | Facility: CLINIC | Age: 74
End: 2024-12-27

## 2024-12-27 ENCOUNTER — OFFICE VISIT (OUTPATIENT)
Dept: SURGERY | Facility: CLINIC | Age: 74
End: 2024-12-27
Payer: MEDICARE

## 2024-12-27 VITALS
DIASTOLIC BLOOD PRESSURE: 76 MMHG | SYSTOLIC BLOOD PRESSURE: 158 MMHG | WEIGHT: 144 LBS | HEART RATE: 84 BPM | BODY MASS INDEX: 26.5 KG/M2 | HEIGHT: 62 IN

## 2024-12-27 DIAGNOSIS — I67.1 BRAIN ANEURYSM (HCC): Primary | ICD-10-CM

## 2024-12-27 PROCEDURE — 1160F RVW MEDS BY RX/DR IN RCRD: CPT | Performed by: NEUROLOGICAL SURGERY

## 2024-12-27 PROCEDURE — 99205 OFFICE O/P NEW HI 60 MIN: CPT | Performed by: NEUROLOGICAL SURGERY

## 2024-12-27 PROCEDURE — 3078F DIAST BP <80 MM HG: CPT | Performed by: NEUROLOGICAL SURGERY

## 2024-12-27 PROCEDURE — 3077F SYST BP >= 140 MM HG: CPT | Performed by: NEUROLOGICAL SURGERY

## 2024-12-27 PROCEDURE — 1159F MED LIST DOCD IN RCRD: CPT | Performed by: NEUROLOGICAL SURGERY

## 2024-12-27 PROCEDURE — 3008F BODY MASS INDEX DOCD: CPT | Performed by: NEUROLOGICAL SURGERY

## 2024-12-27 NOTE — H&P
[facility]  Neurological Surgery Clinic Note    Radha Wells  10/6/1950  [id number]  PCP: Marcell Cardoza MD    REASON FOR VISIT:  Unruptured distal ULYSSES aneurysm    HISTORY OF PRESENT ILLNESS:  Radha Wells is a 74 year old female with a history of polycystic kidney disease who presents to clinic for evaluation of an unruptured distal anterior cerebral artery aneurysm found during workup of dizziness.  CTA of the head 2024 demonstrates a 4 mm distal ULYSSES aneurysm.  She denies smoking or illicits.  She denies a family history of aneurysm, intracranial hemorrhage, or polycystic kidney disease.  She denies a history of thunderclap headache.    PAST MEDICAL HISTORY:  Past Medical History:    Allergic rhinitis    Asthma (HCC)    Brain aneurysm (HCC)    Essential hypertension    Hyperlipidemia    Hypothyroidism    PCK (polycystic kidney disease)       PAST SURGICAL HISTORY:  Past Surgical History:   Procedure Laterality Date    Colposcopy, cervix w/upper adjacent vagina; w/biopsy(s), cervix            3    Tubal ligation         FAMILY HISTORY:  family history includes Breast Cancer (age of onset: 57) in her sister; Heart Disorder in her mother; Hypertension in her father and mother; Ovarian Cancer (age of onset: 55) in her sister.    SOCIAL HISTORY:   reports that she has never smoked. She has never used smokeless tobacco. She reports that she does not drink alcohol and does not use drugs.    ALLERGIES:  Allergies[1]    MEDICATIONS:  Medications Ordered Prior to Encounter[2]    REVIEW OF SYSTEMS:  A 10-point system was reviewed.  Pertinent positives and negatives are noted in HPI.      PHYSICAL EXAMINATION:  VITAL SIGNS: /76   Pulse 84   Ht 62\"   Wt 144 lb (65.3 kg)   BMI 26.34 kg/m²     A&Ox3, no acute distress  PERRL, EOMi, FS, TM  Full strength x 4, no drift  Sensation intact     ASSESSMENT:  74-year-old female with polycystic kidney disease with a 4 mm  distal anterior communicating artery aneurysm    I spoke with the patient and her daughter regarding current clinical situation and plan of care.  We reviewed the imaging the other.  We discussed the natural history of intracranial aneurysm and we discussed the consideration of further workup and treatment.  We discussed the signs and symptoms for which to seek emergent medical attention.  We discussed the modifiable risk factors. We discussed the risks, benefits, alternatives, goals, and expectations of a diagnostic cerebral angiogram which would be needed to further characterize the aneurysm to determine treatment options.  We also discussed the possible treatment options and the risk profile as well.  After this discussion and answering their questions, the patient and her daughter wished to proceed with scheduling for a diagnostic cerebral angiogram we will follow-up thereafter to discuss treatment options.      Plan:  Will schedule for diagnostic cerebral angiogram to further characterize the brain aneurysm    Emiliano Montiel MD  Neurological Surgery  [facility] Time: 60 min including face to face time, chart review, imaging interpretation, and coordination of care         [1]   Allergies  Allergen Reactions    Seasonal    [2]   Current Outpatient Medications on File Prior to Visit   Medication Sig Dispense Refill    atorvastatin 10 MG Oral Tab Take 1 tablet (10 mg total) by mouth daily. 90 tablet 3    levothyroxine 50 MCG Oral Tab Take 1 tablet (50 mcg total) by mouth before breakfast. 90 tablet 3    lisinopril 40 MG Oral Tab Take 1 tablet (40 mg total) by mouth daily. 90 tablet 3    metoprolol succinate ER 50 MG Oral Tablet 24 Hr Take 1 tablet (50 mg total) by mouth daily. 90 tablet 3    albuterol (PROAIR HFA) 108 (90 Base) MCG/ACT Inhalation Aero Soln Inhale 2 puffs into the lungs every 6 (six) hours as needed for Wheezing. 1 each 3    amLODIPine 5 MG  Oral Tab Take 1 tablet (5 mg total) by mouth daily. (Patient not taking: Reported on 12/27/2024) 90 tablet 0     No current facility-administered medications on file prior to visit.

## 2024-12-27 NOTE — PATIENT INSTRUCTIONS
Refill policies:    Allow 2-3 business days for refills; controlled substances may take longer.  Contact your pharmacy at least 5 days prior to running out of medication and have them send an electronic request or submit request through the “request refill” option in your [employer] account.  Refills are not addressed on weekends; covering physicians do not authorize routine medications on weekends.  No narcotics or controlled substances are refilled after noon on Fridays or by on call physicians.  By law, narcotics must be electronically prescribed.  A 30 day supply with no refills is the maximum allowed.  If your prescription is due for a refill, you may be due for a follow up appointment.  To best provide you care, patients receiving routine medications need to be seen at least once a year.  Patients receiving narcotic/controlled substance medications need to be seen at least once every 3 months.  In the event that your preferred pharmacy does not have the requested medication in stock (e.g. Backordered), it is your responsibility to find another pharmacy that has the requested medication available.  We will gladly send a new prescription to that pharmacy at your request.    Scheduling Tests:    If your physician has ordered radiology tests such as MRI or CT scans, please contact Central Scheduling at [phone] right away to schedule the test.  Once scheduled, the [facility] Centralized Referral Team will work with your insurance carrier to obtain pre-certification or prior authorization.  Depending on your insurance carrier, approval may take 3-10 days.  It is highly recommended patients assure they have received an authorization before having a test performed.  If test is done without insurance authorization, patient may be responsible for the entire amount billed.      Precertification and Prior Authorizations:  If your physician has recommended that you have a procedure or additional testing performed the [facility]  Centralized Referral Team will contact your insurance carrier to obtain pre-certification or prior authorization.    You are strongly encouraged to contact your insurance carrier to verify that your procedure/test has been approved and is a COVERED benefit.  Although the [facility] Centralized Referral Team does its due diligence, the insurance carrier gives the disclaimer that \"Although the procedure is authorized, this does not guarantee payment.\"    Ultimately the patient is responsible for payment.   Thank you for your understanding in this matter.  Paperwork Completion:  If you require FMLA or disability paperwork for your recovery, please make sure to either drop it off or have it faxed to our office at [phone]. Be sure the form has your name and date of birth on it.  The form will be faxed to our Forms Department and they will complete it for you.  There is a 25$ fee for all forms that need to be filled out.  Please be aware there is a 10-14 day turnaround time.  You will need to sign a release of information (DAVID) form if your paperwork does not come with one.  You may call the Forms Department with any questions at [phone].  Their fax number is [phone].     You are scheduled for Diagnostic Cerebral Angiogram on 1.8.25 with Dr. Montiel    The procedure is performed at the [facility] located at the [facility].    The hospital will call you 1-2 days prior to the scheduled procedure to review your health history and medications, and will tell you what time to arrive.     This procedure is outpatient.  It is done under conscious sedation and use of local anesthetic at the puncture site. You must have someone available to drive you home since you will be receiving sedation for procedure.     Avoid scheduling any appointments on date of procedure, you can expect to be at the hospital about 6-7 hours.     You will need to have labs completed within 2 weeks of the procedure.  Please call central scheduling to make appointments to have these done at [phone]      Do not take Vitamin E,C, K, fish oil, krill oil, tumeric or any OTC herbal supplements for 10-14 days prior to procedure.      You may continue Aspirin, Plavix and nonsteroidal anti-inflammatories up to and including the day of procedure.     If you have a contrast dye allergy, allergy prep medications will be sent to your pharmacy (benadryl and prednisone)    Do not eat or drink after midnight night before procedure. Ok to take medications with small sips of water.    Our office will get prior authorization from your insurance carrier.    Your follow up appointment is on 1.9.25 at 1:30 pm with Dr Montiel    What to expect:    The right groin or radial artery will be used as the puncture site.  You may experience tenderness, slight swelling and bruising at the puncture site    You may have a mild headache after the procedure. Keep well hydrated and rest.     Post angiogram    Avoid strenuous exercise and excessive bending at hip for the first 24 hours. Keep well hydrated. If groin insertion site begins to bleed, apply continous pressure to area for 10 minutes. If it continues to bleed, call 911 or get to ER immediately.    You may resume normal activities the following day.    You will need to follow up in the office after procedure to discuss the results of the angiogram    Diabetics    Hold Metformin 24 hrs prior to procedure, resume 48 hrs post procedure    Long lasting insulin should be held morning of procedure, if taken at night then 1/2 the normal dose should be taken    Check BS morning of procedure, cover with 1/2 dose of sliding scale instructions    If you have any questions or concerns please contact our office at [phone] #2 or via [employer] message.      For Office Use Only:    Medical Clearances Needed: LEESA  PA: GURWINDER DANGELO  CPT Codes: 06439, 62956, 96538, 53888, 08538

## 2024-12-27 NOTE — TELEPHONE ENCOUNTER
Patient is requesting to speak with surgery schedule in regard to question about being able to attend a graduation for her granddaughter after the procedure on 01/08/2025. The graduation would be the next day on 01/09/2025. Patient states if she is not able to go then she will need to reschedule the appt. Please advise

## 2024-12-27 NOTE — TELEPHONE ENCOUNTER
You are scheduled for Diagnostic Cerebral Angiogram on 1.8.25 with Dr. Montiel    The procedure is performed at the [facility] located at the [facility].    The hospital will call you 1-2 days prior to the scheduled procedure to review your health history and medications, and will tell you what time to arrive.     This procedure is outpatient.  It is done under conscious sedation and use of local anesthetic at the puncture site. You must have someone available to drive you home since you will be receiving sedation for procedure.     Avoid scheduling any appointments on date of procedure, you can expect to be at the hospital about 6-7 hours.     You will need to have labs completed within 2 weeks of the procedure. Please call central scheduling to make appointments to have these done at [phone]      Do not take Vitamin E,C, K, fish oil, krill oil, tumeric or any OTC herbal supplements for 10-14 days prior to procedure.      You may continue Aspirin, Plavix and nonsteroidal anti-inflammatories up to and including the day of procedure.     If you have a contrast dye allergy, allergy prep medications will be sent to your pharmacy (benadryl and prednisone)    Do not eat or drink after midnight night before procedure. Ok to take medications with small sips of water.    Our office will get prior authorization from your insurance carrier.    Your follow up appointment is on 1.9.25 at 1:30 pm with Dr Montiel    What to expect:    The right groin or radial artery will be used as the puncture site.  You may experience tenderness, slight swelling and bruising at the puncture site    You may have a mild headache after the procedure. Keep well hydrated and rest.     Post angiogram    Avoid strenuous exercise and excessive bending at hip for the first 24 hours. Keep well hydrated. If groin insertion site begins to bleed, apply continous pressure to area for 10 minutes. If it continues to  bleed, call 911 or get to ER immediately.    You may resume normal activities the following day.    You will need to follow up in the office after procedure to discuss the results of the angiogram    Diabetics    Hold Metformin 24 hrs prior to procedure, resume 48 hrs post procedure    Long lasting insulin should be held morning of procedure, if taken at night then 1/2 the normal dose should be taken    Check BS morning of procedure, cover with 1/2 dose of sliding scale instructions    If you have any questions or concerns please contact our office at [phone] #2 or via [employer] message.      For Office Use Only:    Medical Clearances Needed: LEESA  PA: GURWINDER DANGELO  CPT Codes: 55908, 10765, 76454, 57953, 24353

## 2024-12-27 NOTE — TELEPHONE ENCOUNTER
Returned patient's called and informed her that it would be ok for her to go to a graduation the day after her DCA

## 2024-12-30 ENCOUNTER — LAB ENCOUNTER (OUTPATIENT)
Dept: LAB | Age: 74
End: 2024-12-30
Attending: INTERNAL MEDICINE
Payer: MEDICARE

## 2024-12-30 DIAGNOSIS — N18.31 STAGE 3A CHRONIC KIDNEY DISEASE (HCC): ICD-10-CM

## 2024-12-30 LAB
ALBUMIN SERPL-MCNC: 4.4 G/DL (ref 3.2–4.8)
ANION GAP SERPL CALC-SCNC: 8 MMOL/L (ref 0–18)
BUN BLD-MCNC: 29 MG/DL (ref 9–23)
BUN/CREAT SERPL: 15.1 (ref 10–20)
CALCIUM BLD-MCNC: 9.8 MG/DL (ref 8.7–10.4)
CHLORIDE SERPL-SCNC: 104 MMOL/L (ref 98–112)
CO2 SERPL-SCNC: 26 MMOL/L (ref 21–32)
CREAT BLD-MCNC: 1.92 MG/DL
EGFRCR SERPLBLD CKD-EPI 2021: 27 ML/MIN/1.73M2 (ref 60–?)
GLUCOSE BLD-MCNC: 106 MG/DL (ref 70–99)
OSMOLALITY SERPL CALC.SUM OF ELEC: 292 MOSM/KG (ref 275–295)
PHOSPHATE SERPL-MCNC: 3.4 MG/DL (ref 2.4–5.1)
POTASSIUM SERPL-SCNC: 4.1 MMOL/L (ref 3.5–5.1)
SODIUM SERPL-SCNC: 138 MMOL/L (ref 136–145)

## 2024-12-30 PROCEDURE — 36415 COLL VENOUS BLD VENIPUNCTURE: CPT

## 2024-12-30 PROCEDURE — 80069 RENAL FUNCTION PANEL: CPT

## 2024-12-31 ENCOUNTER — PATIENT OUTREACH (OUTPATIENT)
Dept: CASE MANAGEMENT | Age: 74
End: 2024-12-31

## 2024-12-31 NOTE — PROGRESS NOTES
Transitions of Care Navigation  Discharge Date: 24 from [facility].   Contact Date: 2024    Transitions of Care Assessment:  ELIEZER Initial Assessment    General:  Assessment completed with: Patient  Patient Subjective: NCM spoke with patient states she is feeling okay. Patient reports ongoing feeling dizzy. She indicates has had episodes of feeling dizzy more often now. She denies any fevers, but has chills. She denies any nausea or vomiting, shortness of breath, chest pain or any others. Patient reports diarrhea. She states is eating and drinking just fine. She reports her blood pressure ranges between 157//58. She denies any questions at this time. NCM scheduled patient with PCP for 25 appointment.  Chief Complaint: Influenza A  Verify patient name and  with patient/ caregiver: Yes    Hospital Stay/Discharge:  Tell me what you understand of why you were in the hospital or emergency department: Pt reports to ED with dizziness and not feeling well.  Prior to leaving the hospital were your Discharge Instructions reviewed with you?: Yes  Did you receive a copy of your written Discharge Instructions?: Yes  What questions do you have about your Discharge Instructions?: none  Do you feel better or worse since you left the hospital or emergency department?: Better    Follow - Up Appointment:  Do you have a follow-up appointment?: Yes  Date: 25  Physician: Dr. Cardoza  Are there any barriers to getting to your follow-up appointment?: No    Home Health/DME:  Prior to leaving the hospital was Home Health (HH) arranged for you?: N/A  Are HH needs identified by staff during the assessment?: No     Prior to leaving the hospital or emergency department was Durable Medical Equipment (DME), medical supplies, or infusions arranged for you?: N/A  Are DME/medical supply/infusions needs identified by staff during this assessment?: No     Medications/Diet:  Did any of your medications change, during or  after your hospital stay or ED visit?: Yes  Do you have your new or updated medications?: Yes  Do you understand what your medications are for and possible side effects?: Yes  Are there any reasons that keep you from taking your medication as prescribed?: No  Any concerns about medication refills?: No    Were you given a different diet per your Discharge Instructions?: No  Reason: n/a     Questions/Concerns:  Do you have any questions or concerns that have not been discussed?: No   ELIEZER Follow-up Assessment    General:  Assessment completed with: Patient  Patient Subjective: [facility] spoke with patient states she is feeling okay. Patient reports ongoing feeling dizzy. She indicates has had episodes of feeling dizzy more often now. She denies any fevers, but has chills. She denies any nausea or vomiting, shortness of breath, chest pain or any others. Patient reports diarrhea. She states is eating and drinking just fine. She reports her blood pressure ranges between 157//58. She denies any questions at this time. [facility] scheduled patient with PCP for 1/6/25 appointment.  Chief Complaint: Influenza A  Community Resources: Other (none)      Nursing Interventions:  All discharge instructions reviewed with the patient. Reviewed when to call MD vs when to call 911 or go the ED. Educated patient on the importance of taking all meds as prescribed as well as close f/u with PCP/specialists. Pt verbalized understanding and will contact the office with any further questions or concerns. Patient denies fevers,  nausea, vomiting, shortness of breath, chest pain, or any other symptoms at this time. [facility] provided contact information for any further questions/concerns. Patient verbalized understanding and agreeable.         Medications:Reviewed medication list.  Medications are up to date.  Current Outpatient Medications   Medication Sig Dispense Refill    amLODIPine 5 MG Oral Tab Take 1 tablet (5 mg total) by mouth daily. 90 tablet 0     atorvastatin 10 MG Oral Tab Take 1 tablet (10 mg total) by mouth daily. 90 tablet 3    levothyroxine 50 MCG Oral Tab Take 1 tablet (50 mcg total) by mouth before breakfast. 90 tablet 3    lisinopril 40 MG Oral Tab Take 1 tablet (40 mg total) by mouth daily. 90 tablet 3    metoprolol succinate ER 50 MG Oral Tablet 24 Hr Take 1 tablet (50 mg total) by mouth daily. 90 tablet 3    albuterol (PROAIR HFA) 108 (90 Base) MCG/ACT Inhalation Aero Soln Inhale 2 puffs into the lungs every 6 (six) hours as needed for Wheezing. 1 each 3       Follow-up Appointments:  Your appointments       Date & Time Appointment Department ([place])    Jan 06, 2025 12:00 PM CST Hospital Follow Up with Marcell Cardoza MD [facility] ([facility])        Jan 09, 2025 1:30 PM CST Follow Up Visit with Emiliano Montiel MD [facility] ([facility])        Jan 21, 2025 1:00 PM CST MA Supervisit with Marcell Cardoza MD [facility] ([facility])        Apr 17, 2025 3:00 PM CDT Follow Up Visit with Dex Astudillo MD [facility] ([facility])              [facility]  [address]  [facility] [id number] [phone] [facility]  [address] [facility] [id number] [phone] [address]  [address]  [facility] [id number] [phone]            [facility]  Was TCC Ordered: No      Primary Care Provider (If no TCC appointment)  Does patient already have a PCP appointment scheduled? Yes  Nurse Care Manager Scheduled PCP office ER Follow-up appointment with  patient       Specialist  Does the patient have any other follow-up appointment(s) need to be scheduled? No         Book By Date: 1/6/25

## 2024-12-31 NOTE — PAT NURSING NOTE
PreOp Instructions     You are scheduled for: a Neuro Interventional Radiology Procedure     Date of Procedure: 01/08/25     Diet Instructions: Do not eat or drink anything after midnight including gum, mints, candy, etc.     Medications: Medications you are allowed to take can be taken with a sip of water the morning of your procedure     Skin Prep : Shower with antibacterial soap using a clean washcloth, prior to procedure. Once dried off, no lotions/powders/creams/ointments, etc.     Arrival Time: The day prior to your procedure you will receive a phone call before 6:00 pm with your arrival time. If you haven't received a phone call, please check your voicemail messages., If you did not receive a voice mail and it is after 6:00 pm, please call the nursing supervisor at [phone].    Driving After Procedure: Sedation will be given so you WILL NOT be able to drive home. You will need a responsible adult  to drive you home. You can NOT take uber or taxi unless approved by your physician in advance.     Discharge Teaching: Your nurse will give you specific instructions before discharge, Most people can resume normal activities in 2-3 days, Any questions, please call the physician's office

## 2025-01-02 NOTE — TELEPHONE ENCOUNTER
Prior authorization request completed for: Diagnostic Cerebral Angiogram    Authorization #NO PRIOR AUTHORIZATION REQUIRED   Authorization dates: 01/08/25 (01/08/25-03/08/25)  CPT codes approved: 50601, 74850, 13386, 60924, 29312  Number of visits/dates of service approved: Outpatient  Physician: Tiana   Location: [facility]     Call Ref#: [id number]  Representative Name: July BUSH   Insurance Carrier: [employer]     No prior authorization required for CPT codes but referral is required which she initiated and received approval.  Referral#[id number]

## 2025-01-03 ENCOUNTER — TELEPHONE (OUTPATIENT)
Dept: FAMILY MEDICINE CLINIC | Facility: CLINIC | Age: 75
End: 2025-01-03

## 2025-01-03 NOTE — TELEPHONE ENCOUNTER
Patient seen in the ER on 12/30/24 and was swabbed positive for Influenz A. She has a follow appointment scheduled for 1/6/25 and is asking if she should keep the appointment. Advised yes (she's feeling dizzy) but a mask is required.

## 2025-01-03 NOTE — TELEPHONE ENCOUNTER
Received a call from patient stating that she presented to the ED yesterday, 1.2.25 and was diagnosed with the flu. Patient stated that:    -she is dizzy and that has been her only symptom other than feeling weak.   -she has no fever, no chills, no cough.   -she is seeing her PCP for a general check up on Monday (this appointment is not related to her DCA).   -she is drinking water, juice, has had oatmeal, but must constantly rest.     Patient's DCA is scheduled for 1.8.25.  Informed patient that she is to call her PCP and give them an update and that ANDREW will check on her condition on Monday, 1.6.25.  HAWK CHATMAN was notified of update via e-mail.     Reminder set to contact patient on Monday, 1.6.

## 2025-01-06 ENCOUNTER — TELEPHONE (OUTPATIENT)
Dept: SURGERY | Facility: CLINIC | Age: 75
End: 2025-01-06

## 2025-01-06 ENCOUNTER — OFFICE VISIT (OUTPATIENT)
Dept: FAMILY MEDICINE CLINIC | Facility: CLINIC | Age: 75
End: 2025-01-06
Payer: MEDICARE

## 2025-01-06 VITALS
WEIGHT: 144 LBS | SYSTOLIC BLOOD PRESSURE: 146 MMHG | DIASTOLIC BLOOD PRESSURE: 76 MMHG | BODY MASS INDEX: 26.5 KG/M2 | TEMPERATURE: 98 F | HEART RATE: 78 BPM | HEIGHT: 62 IN

## 2025-01-06 DIAGNOSIS — J10.1 INFLUENZA A: Primary | ICD-10-CM

## 2025-01-06 DIAGNOSIS — I67.1 INTRACRANIAL ANEURYSM (HCC): ICD-10-CM

## 2025-01-06 DIAGNOSIS — R42 DIZZINESS: ICD-10-CM

## 2025-01-06 DIAGNOSIS — H65.93 FLUID LEVEL BEHIND TYMPANIC MEMBRANE OF BOTH EARS: ICD-10-CM

## 2025-01-06 RX ORDER — MECLIZINE HCL 12.5 MG 12.5 MG/1
12.5 TABLET ORAL 3 TIMES DAILY PRN
Qty: 30 TABLET | Refills: 0 | Status: SHIPPED | OUTPATIENT
Start: 2025-01-06

## 2025-01-06 RX ORDER — OSELTAMIVIR PHOSPHATE 75 MG/1
CAPSULE ORAL
COMMUNITY
Start: 2024-12-31

## 2025-01-06 NOTE — TELEPHONE ENCOUNTER
On review of chart, noted patient was diagnosed with influenza A on 12/30 and was prescribed Tamiflu. I left message with patient to check in with her symptoms. Instructed to call our office tomorrow.

## 2025-01-06 NOTE — PROGRESS NOTES
HPI:    Patient ID: Radha Wells is a 74 year old female.      HPI    Chief Complaint   Patient presents with    ER F/U     Influenza A sometimes still feeling dizzy        Wt Readings from Last 6 Encounters:   01/06/25 144 lb (65.3 kg)   12/27/24 144 lb (65.3 kg)   12/16/24 151 lb (68.5 kg)   12/13/24 169 lb (76.7 kg)   11/19/24 151 lb (68.5 kg)   08/05/24 158 lb (71.7 kg)     BP Readings from Last 3 Encounters:   01/06/25 146/76   12/27/24 158/76   12/16/24 149/72     Patiently recently seen at the emergency room on December 30 and was diagnosed with influenza A  She was seen there for dizziness and was noted to have elevated blood pressures.    She just completed Tamiflu  She is here today with her daughter-in-law.  Mentions feeling dizzy especially with changing position.  She notices dizziness even when laying in bed.  Denies any chest pain shortness of breath.  Denies any visual changes.  She does have an intracranial aneurysm and is in the process of workup and seeing neurosurgery.  She is scheduled for an imaging test with neurosurgery on January 8    Denies any fever or chills.  Denies any cough or shortness of breath.    Review of Systems   Constitutional:  Negative for chills and fever.   Eyes:  Negative for visual disturbance.   Respiratory:  Negative for cough and shortness of breath.    Cardiovascular:  Negative for chest pain, palpitations and leg swelling.   Gastrointestinal:  Negative for abdominal pain, constipation, diarrhea, nausea and vomiting.   Neurological:  Positive for dizziness. Negative for light-headedness and headaches.       /76   Pulse 78   Temp 98.1 °F (36.7 °C) (Temporal)   Ht 5' 2\" (1.575 m)   Wt 144 lb (65.3 kg)   BMI 26.34 kg/m²          Current Outpatient Medications   Medication Sig Dispense Refill    oseltamivir 75 MG Oral Cap TAKE 1 CAPSULE BY MOUTH IN THE MORNING AND 1 CAPSULE BY MOUTH BEFORE BEDTIME. DO ALL THIS FOR 5 DAYS      meclizine 12.5 MG Oral Tab Take 1  tablet (12.5 mg total) by mouth 3 (three) times daily as needed. 30 tablet 0    amLODIPine 5 MG Oral Tab Take 1 tablet (5 mg total) by mouth daily. 90 tablet 0    atorvastatin 10 MG Oral Tab Take 1 tablet (10 mg total) by mouth daily. 90 tablet 3    levothyroxine 50 MCG Oral Tab Take 1 tablet (50 mcg total) by mouth before breakfast. 90 tablet 3    lisinopril 40 MG Oral Tab Take 1 tablet (40 mg total) by mouth daily. 90 tablet 3    metoprolol succinate ER 50 MG Oral Tablet 24 Hr Take 1 tablet (50 mg total) by mouth daily. 90 tablet 3    albuterol (PROAIR HFA) 108 (90 Base) MCG/ACT Inhalation Aero Soln Inhale 2 puffs into the lungs every 6 (six) hours as needed for Wheezing. 1 each 3     Allergies:Allergies[1]   PHYSICAL EXAM:     Chief Complaint   Patient presents with    ER F/U     Influenza A sometimes still feeling dizzy       Physical Exam  Vitals and nursing note reviewed.   Constitutional:       Appearance: She is well-developed.   HENT:      Right Ear: Ear canal normal.      Left Ear: Ear canal normal.      Ears:      Comments: Ear effusion both ears left more than right  Eyes:      Pupils: Pupils are equal, round, and reactive to light.   Neck:      Thyroid: No thyromegaly.   Cardiovascular:      Rate and Rhythm: Normal rate and regular rhythm.      Heart sounds: No murmur heard.  Pulmonary:      Effort: Pulmonary effort is normal.      Breath sounds: Normal breath sounds. No wheezing.   Abdominal:      Palpations: There is no mass.      Tenderness: There is no abdominal tenderness. There is no right CVA tenderness or left CVA tenderness.   Musculoskeletal:      Cervical back: Normal range of motion and neck supple.      Right lower leg: No edema.      Left lower leg: No edema.   Skin:     General: Skin is warm and dry.      Findings: No rash.   Neurological:      Mental Status: She is alert and oriented to person, place, and time.                ASSESSMENT/PLAN:     Encounter Diagnoses   Name Primary?     Influenza A Yes    Intracranial aneurysm (HCC)     Dizziness        1. Influenza A  improved    2. Intracranial aneurysm (HCC)  Imaging planned  Follow-up with neurosurgery    3. Dizziness  Suspect dizziness from area infusion.  Recommend adding Mucinex and may take meclizine as needed for dizziness.  Recommend pushing fluids.  If persist follow-up  - meclizine 12.5 MG Oral Tab; Take 1 tablet (12.5 mg total) by mouth 3 (three) times daily as needed.  Dispense: 30 tablet; Refill: 0    4.  Ear effusion  Recommend taking Mucinex as directed.  May need to see ENT if symptoms persist    No orders of the defined types were placed in this encounter.        The above note was creating using Dragon speech recognition technology. Please excuse any typos    Meds This Visit:  Requested Prescriptions     Signed Prescriptions Disp Refills    meclizine 12.5 MG Oral Tab 30 tablet 0     Sig: Take 1 tablet (12.5 mg total) by mouth 3 (three) times daily as needed.       Imaging & Referrals:  None       ID#[id number]       [1]   Allergies  Allergen Reactions    Seasonal

## 2025-01-07 ENCOUNTER — TELEPHONE (OUTPATIENT)
Dept: NEPHROLOGY | Facility: CLINIC | Age: 75
End: 2025-01-07

## 2025-01-07 ENCOUNTER — TELEPHONE (OUTPATIENT)
Dept: SURGERY | Facility: CLINIC | Age: 75
End: 2025-01-07

## 2025-01-07 DIAGNOSIS — N18.31 STAGE 3A CHRONIC KIDNEY DISEASE (HCC): Primary | ICD-10-CM

## 2025-01-07 NOTE — TELEPHONE ENCOUNTER
Patient contacted the office returning a call from Delaney for rescheduling. Patient accepted the date of January 22nd, 2025. Please return her call when available.

## 2025-01-07 NOTE — TELEPHONE ENCOUNTER
Low kidney function likely from infection     Pls have patient repeat BMP end of next week - pls order

## 2025-01-08 ENCOUNTER — HOSPITAL ENCOUNTER (OUTPATIENT)
Dept: INTERVENTIONAL RADIOLOGY/VASCULAR | Facility: HOSPITAL | Age: 75
Discharge: HOME | End: 2025-01-08
Attending: NEUROLOGICAL SURGERY
Payer: MEDICARE

## 2025-01-13 ENCOUNTER — LAB ENCOUNTER (OUTPATIENT)
Dept: LAB | Age: 75
End: 2025-01-13
Attending: INTERNAL MEDICINE
Payer: MEDICARE

## 2025-01-13 LAB
ANION GAP SERPL CALC-SCNC: 10 MMOL/L (ref 0–18)
BUN BLD-MCNC: 29 MG/DL (ref 9–23)
BUN/CREAT SERPL: 17.4 (ref 10–20)
CALCIUM BLD-MCNC: 10.5 MG/DL (ref 8.7–10.4)
CHLORIDE SERPL-SCNC: 105 MMOL/L (ref 98–112)
CO2 SERPL-SCNC: 27 MMOL/L (ref 21–32)
CREAT BLD-MCNC: 1.67 MG/DL
EGFRCR SERPLBLD CKD-EPI 2021: 32 ML/MIN/1.73M2 (ref 60–?)
FASTING STATUS PATIENT QL REPORTED: YES
GLUCOSE BLD-MCNC: 126 MG/DL (ref 70–99)
OSMOLALITY SERPL CALC.SUM OF ELEC: 301 MOSM/KG (ref 275–295)
POTASSIUM SERPL-SCNC: 5.3 MMOL/L (ref 3.5–5.1)
SODIUM SERPL-SCNC: 142 MMOL/L (ref 136–145)

## 2025-01-13 PROCEDURE — 80048 BASIC METABOLIC PNL TOTAL CA: CPT | Performed by: INTERNAL MEDICINE

## 2025-01-13 PROCEDURE — 36415 COLL VENOUS BLD VENIPUNCTURE: CPT | Performed by: INTERNAL MEDICINE

## 2025-01-15 ENCOUNTER — TELEPHONE (OUTPATIENT)
Dept: SURGERY | Facility: CLINIC | Age: 75
End: 2025-01-15

## 2025-01-15 NOTE — TELEPHONE ENCOUNTER
Message below noted.    Noted from LOV note patient had complaints of dizziness and that cerebral aneurysm was found during workup.     Called patient Daughter July to discuss. Ujly confirmed patient name and .     July states her Daughter whom lives with the patient states patient had some complaints she was dizzy all night last night and couldn't sleep. Patient went to her recliner and was eventually able to fall asleep.     Patient woke up this morning and became dizzy again and very off balance. Patient then ate something and after a while she got better.     July states patient denies any other issues and has no other complaints.     July was just wanting to know what they should be looking for as they did not get a handout on what to expect. Advised July on red flag symptoms and when they should reach back out or proceed to ER.    Advised July we would update the Providers and reach back out if they have any other recommendations.     July acknowledged and appreciative of call.     LOV 24  \"ASSESSMENT:  74-year-old female with polycystic kidney disease with a 4 mm distal anterior communicating artery aneurysm     I spoke with the patient and her daughter regarding current clinical situation and plan of care.  We reviewed the imaging the other.  We discussed the natural history of intracranial aneurysm and we discussed the consideration of further workup and treatment.  We discussed the signs and symptoms for which to seek emergent medical attention.  We discussed the modifiable risk factors. We discussed the risks, benefits, alternatives, goals, and expectations of a diagnostic cerebral angiogram which would be needed to further characterize the aneurysm to determine treatment options.  We also discussed the possible treatment options and the risk profile as well.  After this discussion and answering their questions, the patient and her daughter wished to  proceed with scheduling for a diagnostic cerebral angiogram we will follow-up thereafter to discuss treatment options.       Plan:  Will schedule for diagnostic cerebral angiogram to further characterize the brain aneurysm\"    Routed to Provider.

## 2025-01-15 NOTE — TELEPHONE ENCOUNTER
Patients daughter is requesting to have clinical staff reach out to patient in regard to patient calling her stating that she is been dizzy since yesterday.     The daughter is not sure what to do. Please advise     Future Appointments   Date Time Provider Department Center   1/24/2025  8:30 AM Emiliano Montiel MD ENINAP[facility]

## 2025-01-18 NOTE — TELEPHONE ENCOUNTER
I spoke with patient and her family in regards to her symptoms and complaints of dizziness.  She reports that when she is walking she feels pulled to the side and sometimes needs to sit down.  She denies any room spinning.  She is reports being somewhat lightheaded.  She recently had an influenza A infection and her family reports that her PCP informed her that she had some fluid in her ear and is taking medication for that currently.  I instructed that this is not likely related to her aneurysm.  We reviewed emergency signs of aneurysm rupture and what to look out for and when to report to the emergency room.  We also discussed that if patient is not feeling well and that her angiogram is an elective procedure that it is okay to reschedule if she would like.  The patient and her family will continue to monitor symptoms and inform us of her plan for her angiogram.  Due to her elevated creatinine we will most likely have her arrive earlier to administer some gentle IV fluid hydration prior to her cerebral angiogram.  I also discussed this with Dr. Montiel and he agrees.

## 2025-01-21 ENCOUNTER — OFFICE VISIT (OUTPATIENT)
Dept: FAMILY MEDICINE CLINIC | Facility: CLINIC | Age: 75
End: 2025-01-21
Payer: MEDICARE

## 2025-01-21 VITALS
HEIGHT: 62 IN | DIASTOLIC BLOOD PRESSURE: 68 MMHG | WEIGHT: 144 LBS | HEART RATE: 73 BPM | TEMPERATURE: 98 F | BODY MASS INDEX: 26.5 KG/M2 | SYSTOLIC BLOOD PRESSURE: 152 MMHG

## 2025-01-21 DIAGNOSIS — J45.20 MILD INTERMITTENT REACTIVE AIRWAY DISEASE WITHOUT COMPLICATION (HCC): ICD-10-CM

## 2025-01-21 DIAGNOSIS — E55.9 VITAMIN D DEFICIENCY: ICD-10-CM

## 2025-01-21 DIAGNOSIS — E78.00 HYPERCHOLESTEREMIA: ICD-10-CM

## 2025-01-21 DIAGNOSIS — Q61.3 PKD (POLYCYSTIC KIDNEY DISEASE): ICD-10-CM

## 2025-01-21 DIAGNOSIS — E03.9 HYPOTHYROIDISM, UNSPECIFIED TYPE: ICD-10-CM

## 2025-01-21 DIAGNOSIS — R42 DIZZINESS: ICD-10-CM

## 2025-01-21 DIAGNOSIS — E87.5 HYPERKALEMIA: ICD-10-CM

## 2025-01-21 DIAGNOSIS — D69.6 THROMBOCYTOPENIA: ICD-10-CM

## 2025-01-21 DIAGNOSIS — I67.1 INTRACRANIAL ANEURYSM (HCC): ICD-10-CM

## 2025-01-21 DIAGNOSIS — Z00.00 ENCOUNTER FOR ANNUAL HEALTH EXAMINATION: Primary | ICD-10-CM

## 2025-01-21 DIAGNOSIS — I10 ESSENTIAL HYPERTENSION: ICD-10-CM

## 2025-01-21 DIAGNOSIS — N18.32 STAGE 3B CHRONIC KIDNEY DISEASE (HCC): ICD-10-CM

## 2025-01-21 DIAGNOSIS — R94.31 ABNORMAL FINDING ON EKG: ICD-10-CM

## 2025-01-21 PROBLEM — E66.811 OBESITY (BMI 30.0-34.9): Status: RESOLVED | Noted: 2018-02-05 | Resolved: 2025-01-21

## 2025-01-21 PROBLEM — J44.89 ASTHMA WITH COPD (CHRONIC OBSTRUCTIVE PULMONARY DISEASE) (HCC): Chronic | Status: RESOLVED | Noted: 2024-12-16 | Resolved: 2025-01-21

## 2025-01-21 PROBLEM — J45.909 REACTIVE AIRWAY DISEASE (HCC): Status: RESOLVED | Noted: 2019-06-17 | Resolved: 2025-01-21

## 2025-01-21 PROCEDURE — 96160 PT-FOCUSED HLTH RISK ASSMT: CPT | Performed by: FAMILY MEDICINE

## 2025-01-21 PROCEDURE — 99499 UNLISTED E&M SERVICE: CPT | Performed by: FAMILY MEDICINE

## 2025-01-21 PROCEDURE — G0439 PPPS, SUBSEQ VISIT: HCPCS | Performed by: FAMILY MEDICINE

## 2025-01-21 PROCEDURE — 1159F MED LIST DOCD IN RCRD: CPT | Performed by: FAMILY MEDICINE

## 2025-01-21 PROCEDURE — 1170F FXNL STATUS ASSESSED: CPT | Performed by: FAMILY MEDICINE

## 2025-01-21 PROCEDURE — 1160F RVW MEDS BY RX/DR IN RCRD: CPT | Performed by: FAMILY MEDICINE

## 2025-01-21 PROCEDURE — 1126F AMNT PAIN NOTED NONE PRSNT: CPT | Performed by: FAMILY MEDICINE

## 2025-01-21 PROCEDURE — 99214 OFFICE O/P EST MOD 30 MIN: CPT | Performed by: FAMILY MEDICINE

## 2025-01-21 PROCEDURE — 3077F SYST BP >= 140 MM HG: CPT | Performed by: FAMILY MEDICINE

## 2025-01-21 PROCEDURE — 3078F DIAST BP <80 MM HG: CPT | Performed by: FAMILY MEDICINE

## 2025-01-21 PROCEDURE — 3008F BODY MASS INDEX DOCD: CPT | Performed by: FAMILY MEDICINE

## 2025-01-21 RX ORDER — ALBUTEROL SULFATE 90 UG/1
2 INHALANT RESPIRATORY (INHALATION) EVERY 6 HOURS PRN
Qty: 1 EACH | Refills: 3 | Status: SHIPPED | OUTPATIENT
Start: 2025-01-21

## 2025-01-21 RX ORDER — AMLODIPINE BESYLATE 5 MG/1
5 TABLET ORAL DAILY
Qty: 90 TABLET | Refills: 3 | Status: SHIPPED | OUTPATIENT
Start: 2025-01-21

## 2025-01-21 NOTE — PROGRESS NOTES
Subjective:   Radha Wells is a 74 year old female who presents for a MA AHA (Medicare Advantage Annual Health Assessment) and scheduled follow up of multiple significant but stable problems.     Wt Readings from Last 6 Encounters:   01/21/25 144 lb (65.3 kg)   01/06/25 144 lb (65.3 kg)   12/27/24 144 lb (65.3 kg)   12/16/24 151 lb (68.5 kg)   12/13/24 169 lb (76.7 kg)   11/19/24 151 lb (68.5 kg)     .  BP Readings from Last 3 Encounters:   01/21/25 152/68   01/06/25 146/76   12/27/24 158/76         History/Other:   Fall Risk Assessment:   She has been screened for Falls and is low risk.      Cognitive Assessment:   Abnormal  What day of the week is this?: Correct  What month is it?: Correct  What year is it?: Correct  Recall \"Ball\": Correct  Recall \"Flag\": Incorrect  Recall \"Tree\": Correct    Functional Ability/Status:   Radha Wells has some abnormal functions as listed below:  She has Driving difficulties based on screening of functional status. She has Hearing problems based on screening of functional status.She has problems with Memory based on screening of functional status.       Depression Screening (PHQ):  PHQ-2 SCORE: 0  , done 1/21/2025            Advanced Directives:   She does have a Living Will but we do NOT have it on file in Epic.    She does NOT have a Power of  for Health Care. [Do you have a healthcare power of ?: No]  Discussed Advance Care Planning with patient (and family/surrogate if present). Standard forms made available to patient in After Visit Summary.      Patient Active Problem List   Diagnosis    Essential hypertension    Hypothyroidism    Vitamin D deficiency    Chronic kidney disease, stage III (moderate) (HCC)    PKD (polycystic kidney disease)    Reactive airway disease (HCC)    Hypercholesteremia    Thrombocytopenia (HCC)    Intracranial aneurysm (HCC)     Allergies:  She is allergic to seasonal.    Current Medications:  Outpatient Medications Marked as Taking for  the 25 encounter (Office Visit) with Marcell Cardoza MD   Medication Sig    albuterol (PROAIR HFA) 108 (90 Base) MCG/ACT Inhalation Aero Soln Inhale 2 puffs into the lungs every 6 (six) hours as needed for Wheezing.    amLODIPine 5 MG Oral Tab Take 1 tablet (5 mg total) by mouth daily.    meclizine 12.5 MG Oral Tab Take 1 tablet (12.5 mg total) by mouth 3 (three) times daily as needed.    atorvastatin 10 MG Oral Tab Take 1 tablet (10 mg total) by mouth daily.    levothyroxine 50 MCG Oral Tab Take 1 tablet (50 mcg total) by mouth before breakfast.    lisinopril 40 MG Oral Tab Take 1 tablet (40 mg total) by mouth daily.    metoprolol succinate ER 50 MG Oral Tablet 24 Hr Take 1 tablet (50 mg total) by mouth daily.       Medical History:  She  has a past medical history of Allergic rhinitis, Asthma (HCC), Brain aneurysm (HCC), Brain aneurysm (HCC), Essential hypertension, Hyperlipidemia, Hypothyroidism, and PCK (polycystic kidney disease).  Surgical History:  She  has a past surgical history that includes colposcopy, cervix w/upper adjacent vagina; w/biopsy(s), cervix; tubal ligation; and .   Family History:  Her family history includes Breast Cancer (age of onset: 57) in her sister; Heart Disorder in her mother; Hypertension in her father and mother; Ovarian Cancer (age of onset: 55) in her sister.  Social History:  She  reports that she has never smoked. She has never been exposed to tobacco smoke. She has never used smokeless tobacco. She reports that she does not drink alcohol and does not use drugs.    Tobacco:  She has never smoked tobacco.    CAGE Alcohol Screen:   CAGE screening score of 0 on 2025, showing low risk of alcohol abuse.      Patient Care Team:  Marcell Cardoza MD as PCP - General (Family Medicine)  Emiliano Montiel MD (NEUROSURGERY)    Review of Systems     Negative     Objective:   Physical Exam  General Appearance:  Alert, cooperative, no distress, appears stated age   Head:   Normocephalic, without obvious abnormality, atraumatic   Eyes:  PERRL, conjunctiva/corneas clear, EOM's intact both eyes   Ears:  Normal TM's and external ear canals, both ears   Nose: Nares normal, septum midline,mucosa normal, no drainage or sinus tenderness   Throat: Lips, mucosa, and tongue normal; teeth and gums normal   Neck: Supple, symmetrical, trachea midline, no adenopathy;  thyroid: not enlarged, symmetric, no tenderness/mass/nodules; no carotid bruit or JVD   Back:   Symmetric, no curvature, ROM normal, no CVA tenderness   Lungs:   Clear to auscultation bilaterally, respirations unlabored   Heart:  Regular rate and rhythm, S1 and S2 normal, no murmur, rub, or gallop   Abdomen:   Soft, non-tender, bowel sounds active all four quadrants,  no masses, no organomegaly   Pelvic: Deferred   Extremities: Extremities normal, atraumatic, no cyanosis or edema   Pulses: 2+ and symmetric   Skin: Skin color, texture, turgor normal, no rashes or lesions   Lymph nodes: Cervical, supraclavicular, and axillary nodes normal   Neurologic: Normal       /68   Pulse 73   Temp 97.7 °F (36.5 °C) (Temporal)   Ht 5' 2\" (1.575 m)   Wt 144 lb (65.3 kg)   BMI 26.34 kg/m²  Estimated body mass index is 26.34 kg/m² as calculated from the following:    Height as of this encounter: 5' 2\" (1.575 m).    Weight as of this encounter: 144 lb (65.3 kg).    Medicare Hearing Assessment:   Hearing Screening    Screening Method: Finger Rub  Finger Rub Result: Pass               Assessment & Plan:   Radha Wells is a 74 year old female who presents for a Medicare Assessment.     1. Encounter for annual health examination (Primary)  2. Essential hypertension  -     [facility] CARDIOLOGY EXTERNAL  -     amLODIPine Besylate; Take 1 tablet (5 mg total) by mouth daily.  Dispense: 90 tablet; Refill: 3  -     Detailed, Mod Complex (60895)  3. Hypothyroidism, unspecified type  -     TSH W Reflex To Free T4  -     Detailed, Mod Complex  (62511)  4. Vitamin D deficiency  5. Stage 3b chronic kidney disease (HCC)  -     CBC With Differential With Platelet  6. PKD (polycystic kidney disease)  7. Hypercholesteremia  -     [facility] CARDIOLOGY EXTERNAL  -     Lipid Panel  8. Thrombocytopenia (HCC)  9. Hyperkalemia  -     Basic Metabolic Panel (8)  -     Detailed, Mod Complex (92105)  10. Mild intermittent reactive airway disease without complication (HCC)  -     Albuterol Sulfate HFA; Inhale 2 puffs into the lungs every 6 (six) hours as needed for Wheezing.  Dispense: 1 each; Refill: 3  11. Dizziness  -     CARD ECHO 2D DOPPLER (CPT=93306); Future; Expected date: 01/21/2025  -     [facility] CARDIOLOGY EXTERNAL  -     Detailed, Mod Complex (19804)  12. Abnormal finding on EKG  -     CARD ECHO 2D DOPPLER (CPT=93306); Future; Expected date: 01/21/2025  -     [facility] CARDIOLOGY EXTERNAL  -     Detailed, Mod Complex (09120)  13. Intracranial aneurysm (HCC)    1. Encounter for annual health examination      2. Essential hypertension  Bps better at home  - [facility] CARDIOLOGY EXTERNAL  - amLODIPine 5 MG Oral Tab; Take 1 tablet (5 mg total) by mouth daily.  Dispense: 90 tablet; Refill: 3  - Detailed, Mod Complex (97842)    3. Hypothyroidism, unspecified type  Continue present management   - TSH W Reflex To Free T4  - Detailed, Mod Complex (73091)    4. Vitamin D deficiency  replaced    5. Stage 3b chronic kidney disease (HCC)  Follow up nephrology  - CBC With Differential With Platelet    6. PKD (polycystic kidney disease)  Continue follow up with nephrology    7. Hypercholesteremia  Stable condition  Reviewed medications  Continue current medication management   All questions answered to the best of my ability.     - [facility] CARDIOLOGY EXTERNAL  - Lipid Panel    8. Thrombocytopenia (HCC)  stable    9. Hyperkalemia  Recheck bmp  - Basic Metabolic Panel (8)  - Detailed, Mod Complex (64767)    10. Mild  intermittent reactive airway disease without complication (HCC)  Intermittent  Stable condition  Reviewed medications  Continue current medication management   All questions answered to the best of my ability.    - albuterol (PROAIR HFA) 108 (90 Base) MCG/ACT Inhalation Aero Soln; Inhale 2 puffs into the lungs every 6 (six) hours as needed for Wheezing.  Dispense: 1 each; Refill: 3    11. Dizziness  On and off  Check echo and to see cardiology   - CARD ECHO 2D DOPPLER (CPT=93306); Future  - [facility] CARDIOLOGY EXTERNAL  - Detailed, Mod Complex (40833)    12. Abnormal finding on EKG    - CARD ECHO 2D DOPPLER (CPT=93306); Future  - [facility] CARDIOLOGY EXTERNAL  - Detailed, Mod Complex (74706)    13. Intracranial aneurysm (HCC)  Follow up neurosurgery     The patient indicates understanding of these issues and agrees to the plan.  Consult ordered.  Continue with current treatment plan.  Lab work ordered.  Prescription medication ordered.  Reinforced healthy diet, lifestyle, and exercise.      No follow-ups on file.     Marcell Cardoza MD, 1/21/2025     Supplementary Documentation:   General Health:  In the past six months, have you lost more than 10 pounds without trying?: 2 - No  Has your appetite been poor?: No  Type of Diet: Balanced  How does the patient maintain a good energy level?: Daily Walks  How would you describe your daily physical activity?: Light  How would you describe your current health state?: Fair  How do you maintain positive mental well-being?: Visiting Family  On a scale of 0 to 10, with 0 being no pain and 10 being severe pain, what is your pain level?: 0 - (None)  In the past six months, have you experienced urine leakage?: 0-No  At any time do you feel concerned for the safety/well-being of yourself and/or your children, in your home or elsewhere?: No  Have you had any immunizations at another office such as Influenza, Hepatitis B, Tetanus, or Pneumococcal?: Yes (flu vaccine  at [facility])    Health Maintenance   Topic Date Due    COVID-19 Vaccine (4 - 2024-25 season) 09/01/2024    Annual Well Visit  01/01/2025    Annual Depression Screening  01/01/2025    HTN: BP Follow-Up  02/06/2025    Mammogram  06/24/2025    Colorectal Cancer Screening  08/07/2025    Influenza Vaccine  Completed    DEXA Scan  Completed    Fall Risk Screening (Annual)  Completed    Pneumococcal Vaccine: 50+ Years  Completed    Zoster Vaccines  Completed    Meningococcal B Vaccine  Aged Out

## 2025-01-23 NOTE — PAT NURSING NOTE
Spoke with pt - no changes since last PAT call.     PreOp Instructions     You are scheduled for: a Neuro Interventional Radiology Procedure     Date of Procedure: 01/29/25     Diet Instructions: Do not eat or drink anything after midnight including gum, mints, candy, etc the night before your procedure.     Medications: Medications you are allowed to take can be taken with a sip of water the morning of your procedure. You can take Amlodipine, Lisinopril, Metoprolol, and Levothyroxine.     Medications to Stop: Continue to hold your herbal supplements and vitamins before your procedure.     Skin Prep : Shower with antibacterial soap using a clean washcloth, prior to procedure. Once dried off, no lotions/powders/creams/ointments, etc.     Arrival Time: The day prior to your procedure you will receive a phone call between 3:00 pm - 6:00 pm with your arrival time. If you haven't received a phone call, please check your voicemail messages., If you did not receive a voice mail and it is after 6:00 pm, please call the nursing supervisor at [phone].    Driving After Procedure: Sedation will be given so you WILL NOT be able to drive home. You will need a responsible adult  to drive you home. You can NOT take uber or taxi unless approved by your physician in advance.     Discharge Teaching: Your nurse will give you specific instructions before discharge, Most people can resume normal activities in 2-3 days, Any questions, please call the physician's office

## 2025-01-28 ENCOUNTER — LAB ENCOUNTER (OUTPATIENT)
Dept: LAB | Age: 75
End: 2025-01-28
Attending: FAMILY MEDICINE
Payer: MEDICARE

## 2025-01-28 LAB
ANION GAP SERPL CALC-SCNC: 7 MMOL/L (ref 0–18)
BASOPHILS # BLD AUTO: 0.02 X10(3) UL (ref 0–0.2)
BASOPHILS NFR BLD AUTO: 0.4 %
BUN BLD-MCNC: 29 MG/DL (ref 9–23)
BUN/CREAT SERPL: 16.2 (ref 10–20)
CALCIUM BLD-MCNC: 10.7 MG/DL (ref 8.7–10.4)
CHLORIDE SERPL-SCNC: 103 MMOL/L (ref 98–112)
CHOLEST SERPL-MCNC: 131 MG/DL (ref ?–200)
CO2 SERPL-SCNC: 29 MMOL/L (ref 21–32)
CREAT BLD-MCNC: 1.79 MG/DL
DEPRECATED RDW RBC AUTO: 44.7 FL (ref 35.1–46.3)
EGFRCR SERPLBLD CKD-EPI 2021: 30 ML/MIN/1.73M2 (ref 60–?)
EOSINOPHIL # BLD AUTO: 0.15 X10(3) UL (ref 0–0.7)
EOSINOPHIL NFR BLD AUTO: 2.7 %
ERYTHROCYTE [DISTWIDTH] IN BLOOD BY AUTOMATED COUNT: 13.6 % (ref 11–15)
FASTING PATIENT LIPID ANSWER: YES
FASTING STATUS PATIENT QL REPORTED: YES
GLUCOSE BLD-MCNC: 112 MG/DL (ref 70–99)
HCT VFR BLD AUTO: 37.4 %
HDLC SERPL-MCNC: 55 MG/DL (ref 40–59)
HGB BLD-MCNC: 11.9 G/DL
IMM GRANULOCYTES # BLD AUTO: 0 X10(3) UL (ref 0–1)
IMM GRANULOCYTES NFR BLD: 0 %
LDLC SERPL CALC-MCNC: 58 MG/DL (ref ?–100)
LYMPHOCYTES # BLD AUTO: 2.53 X10(3) UL (ref 1–4)
LYMPHOCYTES NFR BLD AUTO: 45.2 %
MCH RBC QN AUTO: 28.5 PG (ref 26–34)
MCHC RBC AUTO-ENTMCNC: 31.8 G/DL (ref 31–37)
MCV RBC AUTO: 89.5 FL
MONOCYTES # BLD AUTO: 0.23 X10(3) UL (ref 0.1–1)
MONOCYTES NFR BLD AUTO: 4.1 %
NEUTROPHILS # BLD AUTO: 2.67 X10 (3) UL (ref 1.5–7.7)
NEUTROPHILS # BLD AUTO: 2.67 X10(3) UL (ref 1.5–7.7)
NEUTROPHILS NFR BLD AUTO: 47.6 %
NONHDLC SERPL-MCNC: 76 MG/DL (ref ?–130)
OSMOLALITY SERPL CALC.SUM OF ELEC: 295 MOSM/KG (ref 275–295)
PLATELET # BLD AUTO: 145 10(3)UL (ref 150–450)
POTASSIUM SERPL-SCNC: 4.7 MMOL/L (ref 3.5–5.1)
RBC # BLD AUTO: 4.18 X10(6)UL
SODIUM SERPL-SCNC: 139 MMOL/L (ref 136–145)
TRIGL SERPL-MCNC: 99 MG/DL (ref 30–149)
TSI SER-ACNC: 1.56 UIU/ML (ref 0.55–4.78)
VLDLC SERPL CALC-MCNC: 14 MG/DL (ref 0–30)
WBC # BLD AUTO: 5.6 X10(3) UL (ref 4–11)

## 2025-01-28 PROCEDURE — 80048 BASIC METABOLIC PNL TOTAL CA: CPT | Performed by: FAMILY MEDICINE

## 2025-01-28 PROCEDURE — 36415 COLL VENOUS BLD VENIPUNCTURE: CPT | Performed by: FAMILY MEDICINE

## 2025-01-28 PROCEDURE — 85025 COMPLETE CBC W/AUTO DIFF WBC: CPT | Performed by: FAMILY MEDICINE

## 2025-01-28 PROCEDURE — 80061 LIPID PANEL: CPT | Performed by: FAMILY MEDICINE

## 2025-01-28 PROCEDURE — 84443 ASSAY THYROID STIM HORMONE: CPT | Performed by: FAMILY MEDICINE

## 2025-01-29 ENCOUNTER — TELEPHONE (OUTPATIENT)
Dept: SURGERY | Facility: CLINIC | Age: 75
End: 2025-01-29

## 2025-01-29 ENCOUNTER — HOSPITAL ENCOUNTER (OUTPATIENT)
Dept: INTERVENTIONAL RADIOLOGY/VASCULAR | Facility: HOSPITAL | Age: 75
Discharge: HOME | End: 2025-01-29
Attending: NEUROLOGICAL SURGERY | Admitting: NEUROLOGICAL SURGERY
Payer: MEDICARE

## 2025-01-29 VITALS
DIASTOLIC BLOOD PRESSURE: 50 MMHG | TEMPERATURE: 98 F | OXYGEN SATURATION: 99 % | SYSTOLIC BLOOD PRESSURE: 134 MMHG | RESPIRATION RATE: 17 BRPM | HEART RATE: 74 BPM

## 2025-01-29 DIAGNOSIS — I67.1 BRAIN ANEURYSM (HCC): ICD-10-CM

## 2025-01-29 DIAGNOSIS — I67.1 BRAIN ANEURYSM (HCC): Primary | ICD-10-CM

## 2025-01-29 PROCEDURE — 36224 PLACE CATH CAROTD ART: CPT | Performed by: NEUROLOGICAL SURGERY

## 2025-01-29 PROCEDURE — B316YZZ FLUOROSCOPY OF RIGHT INTERNAL CAROTID ARTERY USING OTHER CONTRAST: ICD-10-PCS | Performed by: NEUROLOGICAL SURGERY

## 2025-01-29 PROCEDURE — 99152 MOD SED SAME PHYS/QHP 5/>YRS: CPT | Performed by: NEUROLOGICAL SURGERY

## 2025-01-29 PROCEDURE — 75710 ARTERY X-RAYS ARM/LEG: CPT | Performed by: NEUROLOGICAL SURGERY

## 2025-01-29 PROCEDURE — B313YZZ FLUOROSCOPY OF RIGHT COMMON CAROTID ARTERY USING OTHER CONTRAST: ICD-10-PCS | Performed by: NEUROLOGICAL SURGERY

## 2025-01-29 PROCEDURE — 76937 US GUIDE VASCULAR ACCESS: CPT | Performed by: NEUROLOGICAL SURGERY

## 2025-01-29 PROCEDURE — 76377 3D RENDER W/INTRP POSTPROCES: CPT | Performed by: NEUROLOGICAL SURGERY

## 2025-01-29 RX ORDER — LABETALOL HYDROCHLORIDE 5 MG/ML
10 INJECTION, SOLUTION INTRAVENOUS EVERY 10 MIN PRN
Status: DISCONTINUED | OUTPATIENT
Start: 2025-01-29 | End: 2025-01-29

## 2025-01-29 RX ORDER — SODIUM CHLORIDE 9 MG/ML
INJECTION, SOLUTION INTRAVENOUS CONTINUOUS
Status: DISCONTINUED | OUTPATIENT
Start: 2025-01-29 | End: 2025-01-29

## 2025-01-29 RX ORDER — ACETAMINOPHEN 650 MG/1
650 SUPPOSITORY RECTAL EVERY 4 HOURS PRN
Status: DISCONTINUED | OUTPATIENT
Start: 2025-01-29 | End: 2025-01-29

## 2025-01-29 RX ORDER — IODIXANOL 320 MG/ML
150 INJECTION, SOLUTION INTRAVASCULAR
Status: COMPLETED | OUTPATIENT
Start: 2025-01-29 | End: 2025-01-29

## 2025-01-29 RX ORDER — HEPARIN SODIUM 5000 [USP'U]/ML
INJECTION, SOLUTION INTRAVENOUS; SUBCUTANEOUS
Status: COMPLETED
Start: 2025-01-29 | End: 2025-01-29

## 2025-01-29 RX ORDER — VERAPAMIL HYDROCHLORIDE 2.5 MG/ML
INJECTION, SOLUTION INTRAVENOUS
Status: COMPLETED
Start: 2025-01-29 | End: 2025-01-29

## 2025-01-29 RX ORDER — MIDAZOLAM HYDROCHLORIDE 1 MG/ML
INJECTION INTRAMUSCULAR; INTRAVENOUS
Status: COMPLETED
Start: 2025-01-29 | End: 2025-01-29

## 2025-01-29 RX ORDER — LIDOCAINE HYDROCHLORIDE 10 MG/ML
INJECTION, SOLUTION INFILTRATION; PERINEURAL
Status: COMPLETED
Start: 2025-01-29 | End: 2025-01-29

## 2025-01-29 RX ORDER — PROTAMINE SULFATE 10 MG/ML
INJECTION, SOLUTION INTRAVENOUS
Status: COMPLETED
Start: 2025-01-29 | End: 2025-01-29

## 2025-01-29 RX ORDER — LABETALOL HYDROCHLORIDE 5 MG/ML
INJECTION, SOLUTION INTRAVENOUS
Status: COMPLETED
Start: 2025-01-29 | End: 2025-01-29

## 2025-01-29 RX ORDER — LIDOCAINE/PRILOCAINE 2.5 %-2.5%
CREAM (GRAM) TOPICAL
Status: COMPLETED
Start: 2025-01-29 | End: 2025-01-29

## 2025-01-29 RX ORDER — METOCLOPRAMIDE HYDROCHLORIDE 5 MG/ML
10 INJECTION INTRAMUSCULAR; INTRAVENOUS EVERY 8 HOURS PRN
Status: DISCONTINUED | OUTPATIENT
Start: 2025-01-29 | End: 2025-01-29

## 2025-01-29 RX ORDER — MORPHINE SULFATE 4 MG/ML
2 INJECTION, SOLUTION INTRAMUSCULAR; INTRAVENOUS EVERY 2 HOUR PRN
Status: DISCONTINUED | OUTPATIENT
Start: 2025-01-29 | End: 2025-01-29

## 2025-01-29 RX ORDER — NITROGLYCERIN 20 MG/100ML
INJECTION INTRAVENOUS
Status: COMPLETED
Start: 2025-01-29 | End: 2025-01-29

## 2025-01-29 RX ORDER — MORPHINE SULFATE 4 MG/ML
1 INJECTION, SOLUTION INTRAMUSCULAR; INTRAVENOUS EVERY 2 HOUR PRN
Status: DISCONTINUED | OUTPATIENT
Start: 2025-01-29 | End: 2025-01-29

## 2025-01-29 RX ORDER — ONDANSETRON 2 MG/ML
4 INJECTION INTRAMUSCULAR; INTRAVENOUS EVERY 6 HOURS PRN
Status: DISCONTINUED | OUTPATIENT
Start: 2025-01-29 | End: 2025-01-29

## 2025-01-29 RX ORDER — ACETAMINOPHEN 325 MG/1
650 TABLET ORAL EVERY 4 HOURS PRN
Status: DISCONTINUED | OUTPATIENT
Start: 2025-01-29 | End: 2025-01-29

## 2025-01-29 RX ADMIN — IODIXANOL 60 ML: 320 INJECTION, SOLUTION INTRAVASCULAR at 09:25:00

## 2025-01-29 NOTE — DISCHARGE INSTRUCTIONS
POST NEURO ENDOVASCULAR HOME CARE INSTRUCTIONS    Activity:   DO NOT drive within the first 24 hours after the procedure. You may resume driving late the following day according to the nurse or physician's instructions   Plan on resting and relaxing today and tomorrow  Resume your normal activity after 48 hours, or as instructed by your physician   Do not lift anything over 10 pounds for the next 7 days   Avoid sexual activity for the next 24 hours   Avoid drinking alcohol for the next 24 hours   If the groin site was used, avoid repeated stair use and excessive walking for the next 24 hours   If the wrist was used, avoid bending/flexing of the wrist for the next 24 hours  What is Normal:   A small lump (roughly 2-3 cm) at the procedure site associated with mild tenderness when touched that may last up to a few weeks, but should progressively improve  The procedure site may be bruised or discolored   There may be a small amount of drainage on the bandage   Special Instructions:   Drink plenty of fluids during the next 24 hours to “flush” the contrast from your system   The bandage is to remain in place for 24 hours   Keep the bandage clean and dry   DO NOT submerge the procedure site for 7 days (no bath tubs or pools)   This includes dishwashing/submersion of the wrist, if the wrist was used   After 24 hours, you must remove the bandage   Ok to shower and wash the procedure site gently with soap and water   Wearing a bandage is not necessary. If you choose to wear a bandage for a few days, make sure it remains clean and dry and that it is changed daily      (Post Neuro Endovascular Home Care Instructions continued)    When you should NOTIFY YOUR PHYSICIAN:   Bleeding can occur at the procedure site - both on the outside of the skin and/or beneath the surface of the skin   Swelling or a large lump at the procedure site can occur, which may be accompanied by moderate to severe pain   If either of the above occurs,  lie down flat. Have someone apply pressure to the procedure site with both hands, as instructed by the nurse. Hold pressure for 20 minutes and the bleeding should stop. Notify your physician of the occurrence   If the bleeding does not stop, call 911 and continue to apply pressure   If you experience signs of a fever, temperature >101°F, chills, infection (redness, swelling, thick yellow drainage, or a foul odor from the procedure site)   If you notice any numbness, tingling, or loss of feeling to your leg or foot for groin access   If you notice any numbness, tingling, or loss of feeling to your fingers or hand, if wrist access was utilized  If You Received a Stent:   You will remain on an antiplatelet drug and/or aspirin. Antiplatelet medications are usually taken for six months to one year and should not be stopped unless your interventional neuroradiologist directs you to do so.   These medications help to prevent blockage at the stent site. If another physician or dentist asks you to stop your antiplatelet medication, you need to consult your interventional neuroradiologist first so they can discuss together, the risks that may be involved if you are not taking the antiplatelet medication.  Other:   Cerebral Coils and stents are compatible for MRI/ MRA imaging  You may resume your present diet, unless otherwise specified by your physician   You may resume all of your medications as prescribed, unless otherwise directed by your physician. A list of your medications was provided to you at discharge  Follow up in clinic as directed.  You should be seen in clinic either by the providing physician or nurse practitioner within 2-4 weeks following your procedure

## 2025-01-29 NOTE — H&P
History & Physical Examination    Patient Name: Radha Wells  MRN: [id number]  CSN: [id number]  YOB: 1950    Diagnosis: Cerebral aneurysm    Present Illness: Radha Wells is a 74 year old female with a history of polycystic kidney disease who presents to clinic for evaluation of an unruptured distal anterior cerebral artery aneurysm found during workup of dizziness.  CTA of the head 2024 demonstrates a 4 mm distal ULYSSES aneurysm.  She denies smoking or illicits.  She denies a family history of aneurysm, intracranial hemorrhage, or polycystic kidney disease.  She denies a history of thunderclap headache. She presents today for a diagnostic cerebral angiogram.    Has been NPO MN  Does not take ASA, Plavix, or other anticoagulants.  Denies recent illness such as fevers or GI symptoms.  Labs from 25 reviewed: BMP, CBC, PT/INR all WAL.    No current facility-administered medications for this encounter.       Allergies: Allergies[1]    Past Medical History:    Allergic rhinitis    Asthma (HCC)    Brain aneurysm (HCC)    Brain aneurysm (HCC)    Disorder of thyroid    Essential hypertension    High blood pressure    High cholesterol    Hyperlipidemia    Hypothyroidism    PCK (polycystic kidney disease)     Past Surgical History:   Procedure Laterality Date    Colposcopy, cervix w/upper adjacent vagina; w/biopsy(s), cervix            3    Tubal ligation       Family History   Problem Relation Age of Onset    Hypertension Father     Hypertension Mother     Heart Disorder Mother     Ovarian Cancer Sister 55    Breast Cancer Sister 57    Diabetes Neg      Social History     Tobacco Use    Smoking status: Never     Passive exposure: Never    Smokeless tobacco: Never   Substance Use Topics    Alcohol use: No       SYSTEM Check if Review is Normal Check if Physical Exam is Normal If not normal, please explain:   HEENT [X] [X]    NECK & BACK [X] [X]    HEART [X] [X]    LUNGS [X] [X]    ABDOMEN [X] [X]     UROGENITAL [ ] [ ] deferred   EXTREMITIES [X] [X]    Pedal Pulses X X      Neurological Exam:  Mental status: Oriented to person, place, and time   Speech: Fluent, no dysarthria  CN: II-XII grossly intact  Memory and comprehension: Intact   Motor: No drift, no focal arm or leg weakness.   Sensory: Intact to light touch    [ x ] I have discussed the risks and benefits and alternatives with the patient/family.  They understand and agree to proceed with plan of care.  [ x ] I have reviewed the History and Physical done within the last 30 days.  Any changes noted above.    Rosibel Thacker, APRN  1/29/2025, 7:42 AM  [facility] [id number]          [1]   Allergies  Allergen Reactions    Seasonal

## 2025-01-29 NOTE — PROGRESS NOTES
Pt s/p cerebral angiogram via right femoral artery with Dr. Montiel. Right groin site soft with no evidence of bleeding, right pedal pulse palpable. Neuro exam intact. VSS. Pt awake and tolerating PO. Dr. Montiel at bedside to speak with patient. After bedrest completed, pt voided and ambulated in hallway, groin remains soft and dressing is c/d/I. Discharge instructions reviewed with patient, copy given and all questions answered. IV removed. Pt discharged via wheelchair to [facility], son to drive patient home.

## 2025-01-30 DIAGNOSIS — I10 ESSENTIAL HYPERTENSION: ICD-10-CM

## 2025-01-30 NOTE — TELEPHONE ENCOUNTER
Message received from Provider.    \"Follow up in six months with Rosibel with an MRA brain with vessel wall imaging\"    Patient reminder placed to appear June 2025.

## 2025-01-31 NOTE — TELEPHONE ENCOUNTER
Message below noted.    Patient scheduled 6 month follow-up with Provider for 7/25/25.    Patient is requesting MRA order to schedule prior to follow-up.    Routed to Provider.

## 2025-01-31 NOTE — TELEPHONE ENCOUNTER
Pt has been scheduled for a 6 mnth f/u for 7/25/2025, psr notes pt will need order requested by Dr Montiel for pt to complete prior to f/u.

## 2025-02-03 RX ORDER — METOPROLOL SUCCINATE 50 MG/1
50 TABLET, EXTENDED RELEASE ORAL DAILY
Qty: 90 TABLET | Refills: 3 | Status: SHIPPED | OUTPATIENT
Start: 2025-02-03

## 2025-02-03 NOTE — TELEPHONE ENCOUNTER
Please Review. Protocol Failed; No Protocol     Requested Prescriptions   Pending Prescriptions Disp Refills    METOPROLOL SUCCINATE ER 50 MG Oral Tablet 24 Hr [Pharmacy Med Name: Metoprolol Succinate ER 50 MG Oral Tablet Extended Release 24 Hour] 90 tablet 0     Sig: Take 1 tablet by mouth once daily       Hypertension Medications Protocol Failed - 2/3/2025  2:35 PM        Failed - EGFRCR or GFRNAA > 50     GFR Evaluation  EGFRCR: 30 , resulted on 1/28/2025          Passed - CMP or BMP in past 12 months        Passed - Last BP reading less than 140/90     BP Readings from Last 1 Encounters:   01/29/25 134/50               Passed - In person appointment or virtual visit in the past 12 mos or appointment in next 3 mos     Recent Outpatient Visits              1 week ago Encounter for annual health examination    [facility] Marcell Cardoza MD    Office Visit    4 weeks ago Influenza A    [facility] Marcell Cardoza MD    Office Visit    1 month ago Brain aneurysm (HCC)    [facility] Emiliano Montiel MD    Office Visit    1 month ago Essential hypertension    [facility] Marcell Cardoza MD    Office Visit    2 months ago Stage 3a chronic kidney disease (HCC)    [facility] Dex Astudillo MD    Office Visit          Future Appointments         Provider Department Appt Notes    In 5 days [facility] CARD RM3 ECHO [facility] Cardiodiagnostics     In 2 months Dex Astudillo MD [facility] follow up    In 5 months  MR RM3 (3T WIDE) [facility] MRI F/u    In 5 months Marcell Cardoza MD [facility] 6 mon f/u    In 5 months Rosibel Thacker APRN [address]  f/u 01/29/2025 pt had  Angiogram, pt to have MRA prior to appt per jazzy Loving with daughter appt details and imaging due prior per pt's request                    Passed - Medication is active on med list               Future Appointments         Provider Department Appt Notes    In 5 days [facility] CARD RM3 ECHO [facility] Cardiodiagnostics     In 2 months Dex Astudillo MD [facility] follow up    In 5 months  MR RM3 (3T WIDE) [facility] MRI F/u    In 5 months Marcell Cardoza MD [facility] 6 mon f/u    In 5 months Rosibel Thacker APRN [facility] 6 mnth f/u 01/29/2025 pt had  Angiogram, pt to have MRA prior to appt per jazzy Loving with daughter appt details and imaging due prior per pt's request          Recent Outpatient Visits              1 week ago [facility]    [facility]    4 weeks [facility]    Office Visit    1 month ago Brain aneurysm (HCC)    [facility] Emiliano Montiel MD    Office Visit    1 month ago Essential hypertension    [facility]    Office Visit    2 months ago Stage 3a chronic kidney disease (HCC)    [facility] Dex Astudillo MD    Office Visit

## 2025-02-08 ENCOUNTER — HOSPITAL ENCOUNTER (OUTPATIENT)
Dept: CV DIAGNOSTICS | Facility: HOSPITAL | Age: 75
Discharge: HOME | End: 2025-02-08
Attending: FAMILY MEDICINE
Payer: MEDICARE

## 2025-02-08 DIAGNOSIS — R42 DIZZINESS: ICD-10-CM

## 2025-02-08 DIAGNOSIS — R94.31 ABNORMAL FINDING ON EKG: ICD-10-CM

## 2025-02-08 PROCEDURE — 93306 TTE W/DOPPLER COMPLETE: CPT | Performed by: FAMILY MEDICINE

## 2025-02-09 DIAGNOSIS — I51.7 ENLARGED LA (LEFT ATRIUM): ICD-10-CM

## 2025-02-09 DIAGNOSIS — R42 DIZZINESS: ICD-10-CM

## 2025-02-09 DIAGNOSIS — I51.89 GRADE II DIASTOLIC DYSFUNCTION: ICD-10-CM

## 2025-02-09 DIAGNOSIS — I10 ESSENTIAL HYPERTENSION: Primary | ICD-10-CM

## 2025-02-17 ENCOUNTER — TELEPHONE (OUTPATIENT)
Dept: NEPHROLOGY | Facility: CLINIC | Age: 75
End: 2025-02-17

## 2025-02-17 DIAGNOSIS — N18.31 STAGE 3A CHRONIC KIDNEY DISEASE (HCC): Primary | ICD-10-CM

## 2025-02-17 NOTE — TELEPHONE ENCOUNTER
Have patient do the lab test prior to her visit in April - pls order renal function panle    Repeat lab test showed normal potassium and stable kidney function

## 2025-02-18 NOTE — TELEPHONE ENCOUNTER
Called and spoke to patient. Informed her Dr. Reji Davis has reviewed your lab results. Per doctor your blood work showed normal potassium and stable kidney function. Doctor would like you to have blood work done prior to your appointment in April. Patient understanding.

## 2025-02-20 ENCOUNTER — PATIENT MESSAGE (OUTPATIENT)
Dept: FAMILY MEDICINE CLINIC | Facility: CLINIC | Age: 75
End: 2025-02-20

## 2025-02-21 ENCOUNTER — NURSE TRIAGE (OUTPATIENT)
Dept: FAMILY MEDICINE CLINIC | Facility: CLINIC | Age: 75
End: 2025-02-21

## 2025-02-21 RX ORDER — AMLODIPINE BESYLATE 10 MG/1
10 TABLET ORAL DAILY
COMMUNITY
Start: 2025-02-21

## 2025-02-21 NOTE — TELEPHONE ENCOUNTER
Action Requested: Summary for Provider     []  Critical Lab, Recommendations Needed  [x] Need Additional Advice  []   FYI    []   Need Orders  [] Need Medications Sent to Pharmacy  []  Other     SUMMARY: Daughter reports patient having increased anxiety since 2024 when she was diagnosed with brain aneurysm.  Also has been having elevated blood pressure 182/68.  Has seen Neuro.   Saw Dr May yesterday.  Cardiologist increased her amlodipine from 5 mg to 10 mg.  Also on lisinopril 40 mg and metoprolol 50 mg.  Advised daughter to schedule an office visit to discuss medication.    Daughter will speak to patient and call back for appointment if patient wishes to go forward.  Routed message to Dr Cardoza, any other suggestions for anxiety?    Reason for call: Anxiety  Onset: Late 2024.    Spoke with patient's daughter July, who is on DAVID, verified patient's name/.  Patient is a  who lives by herself.      2025  July Person (proxy for Radha Wells) to P Neelima Rn Triage (supporting Marcell Cardoza MD)         25 11:06 AM  Hi Dr Cardoza my mother and I were talking and she is experiencing some anxiety.  With some of her  health issues is there something safe that she can take to help her when she is feeling anxious?  Reason for Disposition   Symptoms of anxiety or panic attack and is a chronic symptom (recurrent or ongoing AND present > 4 weeks)    Protocols used: Anxiety and Panic Attack-A-OH      Dr Delarosa's office visit 2025  ASSESSMENT    HTN  - Systolic blood pressure ranging 140s to 160s on average, baseline creatinine around 1.7  - Continue lisinopril 40 mg daily, metoprolol succinate 50 mg daily (developed lightheadedness on higher doses of metoprolol)  - Increase amlodipine to 10 mg daily  - Discussed trying low-dose thiazide diuretic with close kidney monitoring versus hydralazine next visit  - She will continue to check her blood pressure at home regularly and  bring a log to the next visit  - Encouraged her to engage in regular exercise and avoid sodium in her dietHLD  - LDL 58, triglycerides 99 on 1/28/2025  - Continue atorvastatin 10 mg dailyPlan  Increase amlodipine to 10 mg daily  Follow-up with me in 1 month for blood pressure check

## 2025-02-24 NOTE — TELEPHONE ENCOUNTER
Spoke to patient's daughter July, release of information on file, identified full name and date of birth. Relayed providers message below. Patient's daughter verbalized understanding and will talk it over with patient and call us back with their decision.  Voices no other questions.

## 2025-04-14 ENCOUNTER — LAB ENCOUNTER (OUTPATIENT)
Dept: LAB | Age: 75
End: 2025-04-14
Attending: INTERNAL MEDICINE
Payer: MEDICARE

## 2025-04-14 LAB
ALBUMIN SERPL-MCNC: 4.4 G/DL (ref 3.2–4.8)
ANION GAP SERPL CALC-SCNC: 7 MMOL/L (ref 0–18)
BUN BLD-MCNC: 35 MG/DL (ref 9–23)
BUN/CREAT SERPL: 18.8 (ref 10–20)
CALCIUM BLD-MCNC: 10.1 MG/DL (ref 8.7–10.4)
CHLORIDE SERPL-SCNC: 108 MMOL/L (ref 98–112)
CO2 SERPL-SCNC: 27 MMOL/L (ref 21–32)
CREAT BLD-MCNC: 1.86 MG/DL (ref 0.55–1.02)
EGFRCR SERPLBLD CKD-EPI 2021: 28 ML/MIN/1.73M2 (ref 60–?)
GLUCOSE BLD-MCNC: 137 MG/DL (ref 70–99)
OSMOLALITY SERPL CALC.SUM OF ELEC: 304 MOSM/KG (ref 275–295)
PHOSPHATE SERPL-MCNC: 3.1 MG/DL (ref 2.4–5.1)
POTASSIUM SERPL-SCNC: 4.8 MMOL/L (ref 3.5–5.1)
SODIUM SERPL-SCNC: 142 MMOL/L (ref 136–145)

## 2025-04-14 PROCEDURE — 36415 COLL VENOUS BLD VENIPUNCTURE: CPT | Performed by: INTERNAL MEDICINE

## 2025-04-14 PROCEDURE — 80069 RENAL FUNCTION PANEL: CPT | Performed by: INTERNAL MEDICINE

## 2025-04-17 ENCOUNTER — OFFICE VISIT (OUTPATIENT)
Dept: NEPHROLOGY | Facility: CLINIC | Age: 75
End: 2025-04-17
Payer: MEDICARE

## 2025-04-17 VITALS
SYSTOLIC BLOOD PRESSURE: 133 MMHG | WEIGHT: 139 LBS | DIASTOLIC BLOOD PRESSURE: 63 MMHG | BODY MASS INDEX: 25.58 KG/M2 | HEART RATE: 73 BPM | HEIGHT: 62 IN

## 2025-04-17 DIAGNOSIS — I10 ESSENTIAL HYPERTENSION: ICD-10-CM

## 2025-04-17 DIAGNOSIS — Q61.3 POLYCYSTIC KIDNEY DISEASE: ICD-10-CM

## 2025-04-17 DIAGNOSIS — N18.31 STAGE 3A CHRONIC KIDNEY DISEASE (HCC): Primary | ICD-10-CM

## 2025-04-17 PROCEDURE — 1159F MED LIST DOCD IN RCRD: CPT | Performed by: INTERNAL MEDICINE

## 2025-04-17 PROCEDURE — 3078F DIAST BP <80 MM HG: CPT | Performed by: INTERNAL MEDICINE

## 2025-04-17 PROCEDURE — 99214 OFFICE O/P EST MOD 30 MIN: CPT | Performed by: INTERNAL MEDICINE

## 2025-04-17 PROCEDURE — 3008F BODY MASS INDEX DOCD: CPT | Performed by: INTERNAL MEDICINE

## 2025-04-17 PROCEDURE — 1126F AMNT PAIN NOTED NONE PRSNT: CPT | Performed by: INTERNAL MEDICINE

## 2025-04-17 PROCEDURE — 3075F SYST BP GE 130 - 139MM HG: CPT | Performed by: INTERNAL MEDICINE

## 2025-04-17 NOTE — PROGRESS NOTES
Progress Note:      Patient is a 74 yrs old female with pmh of CKD stage III, polycystic kidney disease, HTN x 20 yrs, Hypothyroidism, Asthma who presented for follow up     Started following with Dr. Cardoza in 2018. Recent Lab test showed BUN/Cr 40/1.4 mg/dl with an eGFR 39 ml/min. Albumin and calcium wnl. TSH wnl. Creatinine from 2018 1.37 mg/dl with an eGFR 41 ml/min. Hgb 11.5 g/dl    Both parents .  at age of 70 and 80s. No known kidney disease in parents. One sister and 2 brother  of cancer and CAD respectively. Other siblings have cancer , alzheimers but no known kidney disease.     Patient has 3 kids - 42 yrs and 46 yrs daughter. And 40 yrs old son with no known kidney issues     Takes OTC NSAIDs once in great while. No herbal supplement. No urinary complaints    Non smoker, no alcohol. No FH of kidney disease. Drink plenty of water. Retired recently. No new complaints    Drinking 65-75 ounces of liquids.     Home blood pressure are in 130s range. No leg swelling or urinary complaints. Has lost 10 lbs in last few months - state walking more and watching diet. Energy is good.     HISTORY:  Past Medical History:    Allergic rhinitis    Asthma (HCC)    Brain aneurysm (HCC)    Brain aneurysm (HCC)    Disorder of thyroid    Essential hypertension    High blood pressure    High cholesterol    Hyperlipidemia    Hypothyroidism    PCK (polycystic kidney disease)      Past Surgical History:   Procedure Laterality Date    Colposcopy, cervix w/upper adjacent vagina; w/biopsy(s), cervix            3    Tubal ligation        Family History   Problem Relation Age of Onset    Hypertension Father     Hypertension Mother     Heart Disorder Mother     Ovarian Cancer Sister 55    Breast Cancer Sister 57    Diabetes Neg       Social History:   Social History     Socioeconomic History    Marital status:    Tobacco Use    Smoking status: Never     Passive exposure: Never    Smokeless tobacco: Never    Vaping Use    Vaping status: Never Used   Substance and Sexual Activity    Alcohol use: No    Drug use: No     Social Drivers of Health     Transportation Needs: No Transportation Needs (12/31/2024)    Transportation Needs     Lack of Transportation: No        Medications (Active prior to today's visit):  Current Outpatient Medications   Medication Sig Dispense Refill    amLODIPine 10 MG Oral Tab Take 1 tablet (10 mg total) by mouth daily.      metoprolol succinate ER 50 MG Oral Tablet 24 Hr Take 1 tablet (50 mg total) by mouth daily. 90 tablet 3    albuterol (PROAIR HFA) 108 (90 Base) MCG/ACT Inhalation Aero Soln Inhale 2 puffs into the lungs every 6 (six) hours as needed for Wheezing. 1 each 3    atorvastatin 10 MG Oral Tab Take 1 tablet (10 mg total) by mouth daily. 90 tablet 3    levothyroxine 50 MCG Oral Tab Take 1 tablet (50 mcg total) by mouth before breakfast. 90 tablet 3    lisinopril 40 MG Oral Tab Take 1 tablet (40 mg total) by mouth daily. 90 tablet 3    meclizine 12.5 MG Oral Tab Take 1 tablet (12.5 mg total) by mouth 3 (three) times daily as needed. (Patient not taking: Reported on 4/17/2025) 30 tablet 0       Allergies:  Allergies   Allergen Reactions    Seasonal          ROS:     Constitutional:  Negative for decreased activity, fever, irritability and lethargy  ENMT:  Negative for ear drainage, hearing loss and nasal drainage  Eyes:  Negative for eye discharge and vision loss  Cardiovascular:  Negative for chest pain, sobs  Respiratory:  Negative for cough, dyspnea and wheezing  Gastrointestinal:  Negative for abdominal pain, constipation  Genitourinary:  Negative for dysuria and hematuria  Endocrine:  Negative for abnormal sleep patterns, increased activity  Hema/Lymph:  Negative for easy bleeding and easy bruising  Integumentary:  Negative for pruritus and rash  Musculoskeletal:  Negative for bone/joint symptoms  Neurological:  Negative for gait disturbance  Psychiatric:  Negative for  inappropriate interaction and psychiatric symptoms      Vitals:    04/17/25 1458   BP: 133/63   Pulse: 73       Wt Readings from Last 6 Encounters:   04/17/25 139 lb (63 kg)   01/15/25 144 lb (65.3 kg)   01/21/25 144 lb (65.3 kg)   01/06/25 144 lb (65.3 kg)   12/27/24 144 lb (65.3 kg)   12/16/24 151 lb (68.5 kg)         PHYSICAL EXAM:     Constitutional: appears well hydrated alert and responsive no acute distress noted  Head/Face: normocephalic  Eyes/Vision: normal extraocular motion is intact  Nose/Mouth/Throat:mucous membranes are moist   Neck/Thyroid: neck is supple   Skin/Hair: no unusual rashes present  Back/Spine: no abnormalities noted  Musculoskeletal:  no deformities  Extremities: no edema  Neurological:  Grossly normal       ASSESSMENT/PLAN:     1. PKD: CKD stage III:   - renal US concern of polycystic kidney disease - no known FH of CKD or RRT   - Creatinine in 2/2018 was1.37 mg/dl with an eGFR 41 ml/min. Was stable around 1.5-1.6 mg/dl with an eGFR 32-36 ml/min - Cr 1.86 mg/dl with an eGFR 28 ml/min.  - check cystatin C levecl at next visit  - state son might be interested in donating the kidney when needed   - UA, urine albumin and protein unremarkable    - renal US showed enlarged kidneys - right 17 cm and left 18 cm with bilateral innumerable cysts concern for polycystic kidney disease   - defer jynarque given unlikely to be familial origin -  copay very high   - counseled to have her children do urine, blood and US testing for PKD   - PTH mildly up at 123 - vitamin D wnl- Resume vitamin D3 at 2000 units   - drinks >2 liters of water a day     2. HTN:   - home BP in 120-130s   - on lisinopril 40 mg and metoprolol 50 mg  - Na in normal range   - White coat hypertension     Follow up in 4 months.     Lab tests prior to next visit        Orders This Visit:  Orders Placed This Encounter   Procedures    Cystatin C, Serum    PTH, Intact    Microalb/Creat Ratio, Random Urine    Urinalysis, Routine    Renal  Function Panel       Meds This Visit:  Requested Prescriptions      No prescriptions requested or ordered in this encounter       Imaging & Referrals:  None     4/17/2025  Dex Davis MD

## 2025-04-22 ENCOUNTER — TELEPHONE (OUTPATIENT)
Dept: SURGERY | Facility: CLINIC | Age: 75
End: 2025-04-22

## 2025-04-22 NOTE — TELEPHONE ENCOUNTER
Noted provider response.     Attempted to call the patient to inform her, LVM for her to call back.

## 2025-04-22 NOTE — TELEPHONE ENCOUNTER
Noted that patient has contacted ANDREW reporting dizziness after a dental cleaning.     Patient states that she has concerns her symptoms are related to her aneurysms.     Per Dr. Montiel in procedure note on 1.29.25 DCA:    \"CONCLUSION  Angiographic study demonstrates:  1. 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 No immediate technical or clinical complications.\"    Noted that patient has MRA with [facility] scheduled for 7.11.25 and then a follow up appointment on 7.25.25 with ARIANA Monaco.     In reviewing patient's chart, patient reported dizziness possibly related to Influenza infection in a TE started on 1.15.25.     Routed to provider.

## 2025-04-22 NOTE — TELEPHONE ENCOUNTER
Patient requesting to speak with clinical staff regarding new dizziness concerns. Patient states she recently had a cleaning at her dentist and she was feeling very dizzy and uneasy. She believes the dizziness may be related to what she sees Dr. Montiel for.

## 2025-07-11 ENCOUNTER — HOSPITAL ENCOUNTER (OUTPATIENT)
Dept: MRI IMAGING | Facility: HOSPITAL | Age: 75
Discharge: HOME | End: 2025-07-11
Attending: NURSE PRACTITIONER
Payer: MEDICARE

## 2025-07-11 DIAGNOSIS — I67.1 BRAIN ANEURYSM (HCC): ICD-10-CM

## 2025-07-11 PROCEDURE — A9575 INJ GADOTERATE MEGLUMI 0.1ML: HCPCS | Performed by: NURSE PRACTITIONER

## 2025-07-11 PROCEDURE — 70546 MR ANGIOGRAPH HEAD W/O&W/DYE: CPT | Performed by: NURSE PRACTITIONER

## 2025-07-11 RX ORDER — GADOTERATE MEGLUMINE 376.9 MG/ML
15 INJECTION INTRAVENOUS
Status: COMPLETED | OUTPATIENT
Start: 2025-07-11 | End: 2025-07-11

## 2025-07-11 RX ADMIN — GADOTERATE MEGLUMINE 13 ML: 376.9 INJECTION INTRAVENOUS at 12:02:00

## 2025-07-18 ENCOUNTER — HOSPITAL ENCOUNTER (OUTPATIENT)
Dept: MRI IMAGING | Facility: HOSPITAL | Age: 75
Discharge: HOME | End: 2025-07-18
Attending: NURSE PRACTITIONER
Payer: MEDICARE

## 2025-07-18 DIAGNOSIS — I67.1 BRAIN ANEURYSM (HCC): ICD-10-CM

## 2025-07-18 PROCEDURE — A9575 INJ GADOTERATE MEGLUMI 0.1ML: HCPCS | Performed by: NURSE PRACTITIONER

## 2025-07-18 RX ORDER — GADOTERATE MEGLUMINE 376.9 MG/ML
15 INJECTION INTRAVENOUS
Status: COMPLETED | OUTPATIENT
Start: 2025-07-18 | End: 2025-07-18

## 2025-07-18 RX ADMIN — GADOTERATE MEGLUMINE 13 ML: 376.9 INJECTION INTRAVENOUS at 12:12:00

## 2025-07-21 ENCOUNTER — OFFICE VISIT (OUTPATIENT)
Dept: FAMILY MEDICINE CLINIC | Facility: CLINIC | Age: 75
End: 2025-07-21
Payer: MEDICARE

## 2025-07-21 VITALS
WEIGHT: 136 LBS | BODY MASS INDEX: 25.03 KG/M2 | SYSTOLIC BLOOD PRESSURE: 136 MMHG | DIASTOLIC BLOOD PRESSURE: 73 MMHG | OXYGEN SATURATION: 98 % | HEART RATE: 68 BPM | HEIGHT: 62 IN | RESPIRATION RATE: 16 BRPM

## 2025-07-21 DIAGNOSIS — E78.00 HYPERCHOLESTEREMIA: ICD-10-CM

## 2025-07-21 DIAGNOSIS — E87.1 HYPONATREMIA: ICD-10-CM

## 2025-07-21 DIAGNOSIS — E03.9 HYPOTHYROIDISM, UNSPECIFIED TYPE: ICD-10-CM

## 2025-07-21 DIAGNOSIS — I10 ESSENTIAL HYPERTENSION: Primary | ICD-10-CM

## 2025-07-21 DIAGNOSIS — N18.32 STAGE 3B CHRONIC KIDNEY DISEASE (HCC): ICD-10-CM

## 2025-07-21 DIAGNOSIS — Z12.31 ENCOUNTER FOR SCREENING MAMMOGRAM FOR BREAST CANCER: ICD-10-CM

## 2025-07-21 DIAGNOSIS — D64.9 MILD ANEMIA: ICD-10-CM

## 2025-07-21 DIAGNOSIS — Z12.11 COLON CANCER SCREENING: ICD-10-CM

## 2025-07-21 PROCEDURE — 99214 OFFICE O/P EST MOD 30 MIN: CPT | Performed by: FAMILY MEDICINE

## 2025-07-21 PROCEDURE — 1126F AMNT PAIN NOTED NONE PRSNT: CPT | Performed by: FAMILY MEDICINE

## 2025-07-21 PROCEDURE — 1170F FXNL STATUS ASSESSED: CPT | Performed by: FAMILY MEDICINE

## 2025-07-21 PROCEDURE — 3008F BODY MASS INDEX DOCD: CPT | Performed by: FAMILY MEDICINE

## 2025-07-21 PROCEDURE — 1160F RVW MEDS BY RX/DR IN RCRD: CPT | Performed by: FAMILY MEDICINE

## 2025-07-21 PROCEDURE — 3078F DIAST BP <80 MM HG: CPT | Performed by: FAMILY MEDICINE

## 2025-07-21 PROCEDURE — 1159F MED LIST DOCD IN RCRD: CPT | Performed by: FAMILY MEDICINE

## 2025-07-21 PROCEDURE — 3075F SYST BP GE 130 - 139MM HG: CPT | Performed by: FAMILY MEDICINE

## 2025-07-21 NOTE — PROGRESS NOTES
HPI:    Patient ID: Radha Wells is a 74 year old female.    History of Present Illness       HPI    Chief Complaint   Patient presents with    Follow - Up     Pt. Presents for a 6 month follow up on medications and current health.        Wt Readings from Last 6 Encounters:   07/21/25 136 lb (61.7 kg)   04/17/25 139 lb (63 kg)   01/15/25 144 lb (65.3 kg)   01/21/25 144 lb (65.3 kg)   01/06/25 144 lb (65.3 kg)   12/27/24 144 lb (65.3 kg)     BP Readings from Last 3 Encounters:   07/21/25 136/73   04/17/25 133/63   01/29/25 134/50     Eating healthier  Has oatmeal and coffee in am    Seen in ER 7/7/25 for dizziness    Did see neurosurgery for brain aneurysm.  Had recent mra brain recently:  Fusiform dilatation of A2 segment anterior communicating arteries is again identified. This is near the pericallosal region. This measures approximately 4 mm and is unchanged. No inflammatory change or enhancement in the vessel wall surrounding the   aneurysm is noted.    Review of Systems   Constitutional:  Negative for chills and fever.   Eyes:  Negative for visual disturbance.   Respiratory:  Negative for cough, shortness of breath and wheezing.    Cardiovascular:  Negative for chest pain, palpitations and leg swelling.   Genitourinary:  Negative for decreased urine volume and difficulty urinating.   Neurological:  Negative for dizziness, tremors, seizures, weakness, light-headedness, numbness and headaches.       /73   Pulse 68   Resp 16   Ht 5' 2\" (1.575 m)   Wt 136 lb (61.7 kg)   SpO2 98%   BMI 24.87 kg/m²        Current Medications[1]  Allergies:Allergies[2]   PHYSICAL EXAM:     Chief Complaint   Patient presents with    Follow - Up     Pt. Presents for a 6 month follow up on medications and current health.       Physical Exam  Vitals and nursing note reviewed.   Constitutional:       Appearance: She is well-developed.   Eyes:      Pupils: Pupils are equal, round, and reactive to light.   Neck:      Thyroid: No  thyromegaly.   Cardiovascular:      Rate and Rhythm: Normal rate and regular rhythm.      Heart sounds: No murmur heard.  Pulmonary:      Effort: Pulmonary effort is normal.      Breath sounds: Normal breath sounds. No wheezing.   Abdominal:      Palpations: There is no mass.      Tenderness: There is no abdominal tenderness. There is no right CVA tenderness or left CVA tenderness.   Musculoskeletal:      Cervical back: Normal range of motion and neck supple.      Right lower leg: No edema.      Left lower leg: No edema.   Skin:     General: Skin is warm and dry.      Findings: No rash.   Neurological:      Mental Status: She is alert and oriented to person, place, and time.                ASSESSMENT/PLAN:   There are no diagnoses linked to this encounter.     Assessment & Plan     1. Essential hypertension  Stable condition  Reviewed medications  Continue current medication management       2. Hypothyroidism, unspecified type  Recheck tsh  - TSH W Reflex To Free T4    3. Stage 3b chronic kidney disease (HCC)  Follow up nephrology  - Comp Metabolic Panel (14)    4. Hypercholesteremia    - Lipid Panel    5. Hyponatremia  Check labs    6. Colon cancer screening    - COLOGUARD COLON CANCER SCREENING (EXTERNAL)    7. Encounter for screening mammogram for breast cancer    - [facility] MARK 2D+3D SCREENING BILAT (CPT=77067/59042); Future    8. Mild anemia  Recheck cbc   - Iron And Tibc      Orders Placed This Encounter   Procedures    Lipid Panel    TSH W Reflex To Free T4    Comp Metabolic Panel (14)    Iron And Tibc         The above note was creating using Dragon speech recognition technology. Please excuse any typos    Meds This Visit:  Requested Prescriptions      No prescriptions requested or ordered in this encounter       Imaging & Referrals:  COLOGUARD COLON CANCER SCREENING (EXTERNAL)  [facility] MARK 2D+3D SCREENING BILAT (CPT=77067/76530)       ID#[id number]         [1]   Current Outpatient Medications   Medication Sig Dispense  Refill    amLODIPine 10 MG Oral Tab Take 1 tablet (10 mg total) by mouth daily.      metoprolol succinate ER 50 MG Oral Tablet 24 Hr Take 1 tablet (50 mg total) by mouth daily. 90 tablet 3    albuterol (PROAIR HFA) 108 (90 Base) MCG/ACT Inhalation Aero Soln Inhale 2 puffs into the lungs every 6 (six) hours as needed for Wheezing. 1 each 3    atorvastatin 10 MG Oral Tab Take 1 tablet (10 mg total) by mouth daily. 90 tablet 3    levothyroxine 50 MCG Oral Tab Take 1 tablet (50 mcg total) by mouth before breakfast. 90 tablet 3    meclizine 12.5 MG Oral Tab Take 1 tablet (12.5 mg total) by mouth 3 (three) times daily as needed. (Patient not taking: Reported on 7/21/2025) 30 tablet 0    lisinopril 40 MG Oral Tab Take 1 tablet (40 mg total) by mouth daily. 90 tablet 3   [2]   Allergies  Allergen Reactions    Seasonal

## 2025-07-25 ENCOUNTER — OFFICE VISIT (OUTPATIENT)
Dept: SURGERY | Facility: CLINIC | Age: 75
End: 2025-07-25
Payer: MEDICARE

## 2025-07-25 DIAGNOSIS — I67.1 BRAIN ANEURYSM (HCC): Primary | ICD-10-CM

## 2025-07-25 NOTE — PATIENT INSTRUCTIONS
MRA brain 1 year      Refill policies:    Allow 2-3 business days for refills; controlled substances may take longer.  Contact your pharmacy at least 5 days prior to running out of medication and have them send an electronic request or submit request through the “request refill” option in your [employer] account.  Refills are not addressed on weekends; covering physicians do not authorize routine medications on weekends.  No narcotics or controlled substances are refilled after noon on Fridays or by on call physicians.  By law, narcotics must be electronically prescribed.  A 30 day supply with no refills is the maximum allowed.  If your prescription is due for a refill, you may be due for a follow up appointment.  To best provide you care, patients receiving routine medications need to be seen at least once a year.  Patients receiving narcotic/controlled substance medications need to be seen at least once every 3 months.  In the event that your preferred pharmacy does not have the requested medication in stock (e.g. Backordered), it is your responsibility to find another pharmacy that has the requested medication available.  We will gladly send a new prescription to that pharmacy at your request.    Scheduling Tests:    If your physician has ordered radiology tests such as MRI or CT scans, please contact Central Scheduling at [phone] right away to schedule the test.  Once scheduled, the [facility] Centralized Referral Team will work with your insurance carrier to obtain pre-certification or prior authorization.  Depending on your insurance carrier, approval may take 3-10 days.  It is highly recommended patients assure they have received an authorization before having a test performed.  If test is done without insurance authorization, patient may be responsible for the entire amount billed.      Precertification and Prior Authorizations:  If your physician has recommended that you have a procedure or additional testing  performed the [facility] Centralized Referral Team will contact your insurance carrier to obtain pre-certification or prior authorization.    You are strongly encouraged to contact your insurance carrier to verify that your procedure/test has been approved and is a COVERED benefit.  Although the [facility] Centralized Referral Team does its due diligence, the insurance carrier gives the disclaimer that \"Although the procedure is authorized, this does not guarantee payment.\"    Ultimately the patient is responsible for payment.   Thank you for your understanding in this matter.  Paperwork Completion:  If you require FMLA or disability paperwork for your recovery, please make sure to either drop it off or have it faxed to our office at [phone]. Be sure the form has your name and date of birth on it.  The form will be faxed to our Forms Department and they will complete it for you.  There is a 25$ fee for all forms that need to be filled out.  Please be aware there is a 10-14 day turnaround time.  You will need to sign a release of information (DAVID) form if your paperwork does not come with one.  You may call the Forms Department with any questions at [phone].  Their fax number is [phone].

## 2025-07-25 NOTE — PROGRESS NOTES
Formerly [facility]  Neurological Surgery Clinic Note    Radha Wells  10/6/1950  [id number]  PCP: Marcell Cardoza MD    REASON FOR VISIT:  Follow up for ULYSSES A2 aneurysm    HISTORY OF PRESENT ILLNESS:  Radha Wells is a very pleasant 74 year old female with a history of polycystic kidney disease who presents to clinic for evaluation of an unruptured distal anterior cerebral artery aneurysm found during workup of dizziness. CTA of the head 12/13/2024 demonstrates a 4 mm distal ULYSSES aneurysm. She denies smoking or illicits. She denies a family history of aneurysm, intracranial hemorrhage, or polycystic kidney disease. She underwent a diagnostic cerebral angiogram on 1/29/25 and was recommended for surveillance imaging.     Interval History 7/25/25:  Patient presents to review MRA with VWI. She states she was in the ED 7/7/25 with c/o dizziness. Currently on 3 BP meds.    PAST MEDICAL HISTORY:  Past Medical History[1]    PAST SURGICAL HISTORY:  Past Surgical History[2]    FAMILY HISTORY:  family history includes Breast Cancer (age of onset: 57) in her sister; Heart Disorder in her mother; Hypertension in her father and mother; Ovarian Cancer (age of onset: 55) in her sister.    SOCIAL HISTORY:   reports that she has never smoked. She has never been exposed to tobacco smoke. She has never used smokeless tobacco. She reports that she does not drink alcohol and does not use drugs.    ALLERGIES:  Allergies[3]    MEDICATIONS:  Medications Ordered Prior to Encounter[4]    REVIEW OF SYSTEMS:  A 10-point system was reviewed.  Pertinent positives and negatives are noted in HPI.      PHYSICAL EXAMINATION:  VITAL SIGNS: There were no vitals taken for this visit.    A&Ox3, no acute distress  PERRL, EOMi, FS, TM  Full strength x 4, no drift  Sensation intact     Imaging Review:  MRI brain with VWI 7/11/25  Fusiform dilatation of A2 segment anterior communicating arteries is again identified. This is near the pericallosal region.  This measures approximately 4 mm and is unchanged. No inflammatory change or enhancement in the vessel wall surrounding the aneurysm is noted.    ASSESSMENT:  74-year-old female with polycystic kidney disease with a 4 mm distal anterior communicating artery aneurysm       Plan:  We will repeat MRA brain in 1 year    We reviewed stroke symptoms and when to go to the ED. We discussed going to the ED with symptoms of WHOL. The patient was instructed to continue healthy lifestyle habits to include not smoking, managing blood pressure, healthy eating and regular exercise.    All questions were answered and the patient was instructed to call or message with any additional questions or concerns.     ARIANA Friedman, CNP  Neurological Surgery  [facility]    Care Time: 30 min including face to face time, chart review, imaging interpretation, and coordination of care         [1]   Past Medical History:   Allergic rhinitis    Asthma (HCC)    Brain aneurysm (HCC)    Brain aneurysm (HCC)    Disorder of thyroid    Essential hypertension    High blood pressure    High cholesterol    Hyperlipidemia    Hypothyroidism    PCK (polycystic kidney disease)   [2]   Past Surgical History:  Procedure Laterality Date    Colposcopy, cervix w/upper adjacent vagina; w/biopsy(s), cervix            3    Tubal ligation     [3]   Allergies  Allergen Reactions    Seasonal    [4]   Current Outpatient Medications on File Prior to Visit   Medication Sig Dispense Refill    amLODIPine 10 MG Oral Tab Take 1 tablet (10 mg total) by mouth daily.      metoprolol succinate ER 50 MG Oral Tablet 24 Hr Take 1 tablet (50 mg total) by mouth daily. 90 tablet 3    albuterol (PROAIR HFA) 108 (90 Base) MCG/ACT Inhalation Aero Soln Inhale 2 puffs into the lungs every 6 (six) hours as needed for Wheezing. 1 each 3    meclizine 12.5 MG Oral Tab Take 1 tablet (12.5 mg total) by mouth 3 (three) times daily as needed. 30 tablet 0    atorvastatin 10  MG Oral Tab Take 1 tablet (10 mg total) by mouth daily. 90 tablet 3    levothyroxine 50 MCG Oral Tab Take 1 tablet (50 mcg total) by mouth before breakfast. 90 tablet 3    lisinopril 40 MG Oral Tab Take 1 tablet (40 mg total) by mouth daily. 90 tablet 3     No current facility-administered medications on file prior to visit.

## 2025-07-28 ENCOUNTER — TELEPHONE (OUTPATIENT)
Dept: SURGERY | Facility: CLINIC | Age: 75
End: 2025-07-28

## 2025-08-12 ENCOUNTER — LAB ENCOUNTER (OUTPATIENT)
Dept: LAB | Age: 75
End: 2025-08-12
Attending: INTERNAL MEDICINE

## 2025-08-12 DIAGNOSIS — N18.31 STAGE 3A CHRONIC KIDNEY DISEASE (HCC): ICD-10-CM

## 2025-08-12 DIAGNOSIS — Q61.3 POLYCYSTIC KIDNEY DISEASE: ICD-10-CM

## 2025-08-12 DIAGNOSIS — I10 ESSENTIAL HYPERTENSION: ICD-10-CM

## 2025-08-12 LAB
ALBUMIN SERPL-MCNC: 4.7 G/DL (ref 3.2–4.8)
ALBUMIN/GLOB SERPL: 2 (ref 1–2)
ALP LIVER SERPL-CCNC: 93 U/L (ref 55–142)
ALT SERPL-CCNC: 7 U/L (ref 10–49)
ANION GAP SERPL CALC-SCNC: 4 MMOL/L (ref 0–18)
AST SERPL-CCNC: 18 U/L (ref ?–34)
BASOPHILS # BLD AUTO: 0.04 X10(3) UL (ref 0–0.2)
BASOPHILS NFR BLD AUTO: 0.5 %
BILIRUB SERPL-MCNC: 0.6 MG/DL (ref 0.2–1.1)
BILIRUB UR QL: NEGATIVE
BUN BLD-MCNC: 27 MG/DL (ref 9–23)
BUN/CREAT SERPL: 14.4 (ref 10–20)
CALCIUM BLD-MCNC: 10.2 MG/DL (ref 8.7–10.4)
CHLORIDE SERPL-SCNC: 104 MMOL/L (ref 98–112)
CHOLEST SERPL-MCNC: 121 MG/DL (ref ?–200)
CLARITY UR: CLEAR
CO2 SERPL-SCNC: 25 MMOL/L (ref 21–32)
CREAT BLD-MCNC: 1.88 MG/DL (ref 0.55–1.02)
CREAT UR-SCNC: 80.1 MG/DL
DEPRECATED RDW RBC AUTO: 43.3 FL (ref 35.1–46.3)
EGFRCR SERPLBLD CKD-EPI 2021: 28 ML/MIN/1.73M2 (ref 60–?)
EOSINOPHIL # BLD AUTO: 0.13 X10(3) UL (ref 0–0.7)
EOSINOPHIL NFR BLD AUTO: 1.7 %
ERYTHROCYTE [DISTWIDTH] IN BLOOD BY AUTOMATED COUNT: 13.1 % (ref 11–15)
FASTING PATIENT LIPID ANSWER: YES
FASTING STATUS PATIENT QL REPORTED: YES
GLOBULIN PLAS-MCNC: 2.4 G/DL (ref 2–3.5)
GLUCOSE BLD-MCNC: 112 MG/DL (ref 70–99)
GLUCOSE UR-MCNC: NORMAL MG/DL
HCT VFR BLD AUTO: 35.8 % (ref 35–48)
HDLC SERPL-MCNC: 63 MG/DL (ref 40–59)
HGB BLD-MCNC: 11.6 G/DL (ref 12–16)
HGB UR QL STRIP.AUTO: NEGATIVE
IMM GRANULOCYTES # BLD AUTO: 0.01 X10(3) UL (ref 0–1)
IMM GRANULOCYTES NFR BLD: 0.1 %
IRON SATN MFR SERPL: 18 % (ref 15–50)
IRON SERPL-MCNC: 55 UG/DL (ref 50–170)
KETONES UR-MCNC: NEGATIVE MG/DL
LDLC SERPL CALC-MCNC: 37 MG/DL (ref ?–100)
LEUKOCYTE ESTERASE UR QL STRIP.AUTO: NEGATIVE
LYMPHOCYTES # BLD AUTO: 3.06 X10(3) UL (ref 1–4)
LYMPHOCYTES NFR BLD AUTO: 40.2 %
MCH RBC QN AUTO: 29.1 PG (ref 26–34)
MCHC RBC AUTO-ENTMCNC: 32.4 G/DL (ref 31–37)
MCV RBC AUTO: 89.7 FL (ref 80–100)
MICROALBUMIN UR-MCNC: 1.2 MG/DL
MICROALBUMIN/CREAT 24H UR-RTO: 15 UG/MG (ref ?–30)
MONOCYTES # BLD AUTO: 0.26 X10(3) UL (ref 0.1–1)
MONOCYTES NFR BLD AUTO: 3.4 %
NEUTROPHILS # BLD AUTO: 4.11 X10 (3) UL (ref 1.5–7.7)
NEUTROPHILS # BLD AUTO: 4.11 X10(3) UL (ref 1.5–7.7)
NEUTROPHILS NFR BLD AUTO: 54.1 %
NITRITE UR QL STRIP.AUTO: NEGATIVE
NONHDLC SERPL-MCNC: 58 MG/DL (ref ?–130)
OSMOLALITY SERPL CALC.SUM OF ELEC: 282 MOSM/KG (ref 275–295)
PH UR: 5.5 (ref 5–8)
PHOSPHATE SERPL-MCNC: 2.9 MG/DL (ref 2.4–5.1)
PLATELET # BLD AUTO: 160 10(3)UL (ref 150–450)
POTASSIUM SERPL-SCNC: 4.7 MMOL/L (ref 3.5–5.1)
PROT SERPL-MCNC: 7.1 G/DL (ref 5.7–8.2)
PROT UR-MCNC: NEGATIVE MG/DL
PTH-INTACT SERPL-MCNC: 109.9 PG/ML (ref 18.5–88)
RBC # BLD AUTO: 3.99 X10(6)UL (ref 3.8–5.3)
SODIUM SERPL-SCNC: 133 MMOL/L (ref 136–145)
SP GR UR STRIP: 1.01 (ref 1–1.03)
TOTAL IRON BINDING CAPACITY: 312 UG/DL (ref 250–425)
TRANSFERRIN SERPL-MCNC: 235 MG/DL (ref 250–380)
TRIGL SERPL-MCNC: 116 MG/DL (ref 30–149)
TSI SER-ACNC: 0.98 UIU/ML (ref 0.55–4.78)
UROBILINOGEN UR STRIP-ACNC: NORMAL
VLDLC SERPL CALC-MCNC: 16 MG/DL (ref 0–30)
WBC # BLD AUTO: 7.6 X10(3) UL (ref 4–11)

## 2025-08-12 PROCEDURE — 84100 ASSAY OF PHOSPHORUS: CPT | Performed by: FAMILY MEDICINE

## 2025-08-12 PROCEDURE — 80061 LIPID PANEL: CPT | Performed by: FAMILY MEDICINE

## 2025-08-12 PROCEDURE — 84443 ASSAY THYROID STIM HORMONE: CPT | Performed by: FAMILY MEDICINE

## 2025-08-12 PROCEDURE — 36415 COLL VENOUS BLD VENIPUNCTURE: CPT | Performed by: INTERNAL MEDICINE

## 2025-08-12 PROCEDURE — 83540 ASSAY OF IRON: CPT | Performed by: FAMILY MEDICINE

## 2025-08-12 PROCEDURE — 85025 COMPLETE CBC W/AUTO DIFF WBC: CPT | Performed by: FAMILY MEDICINE

## 2025-08-12 PROCEDURE — 83970 ASSAY OF PARATHORMONE: CPT | Performed by: INTERNAL MEDICINE

## 2025-08-12 PROCEDURE — 84466 ASSAY OF TRANSFERRIN: CPT | Performed by: FAMILY MEDICINE

## 2025-08-12 PROCEDURE — 82570 ASSAY OF URINE CREATININE: CPT | Performed by: INTERNAL MEDICINE

## 2025-08-12 PROCEDURE — 82043 UR ALBUMIN QUANTITATIVE: CPT | Performed by: INTERNAL MEDICINE

## 2025-08-12 PROCEDURE — 81003 URINALYSIS AUTO W/O SCOPE: CPT | Performed by: INTERNAL MEDICINE

## 2025-08-12 PROCEDURE — 80053 COMPREHEN METABOLIC PANEL: CPT | Performed by: FAMILY MEDICINE

## 2025-08-12 PROCEDURE — 82610 CYSTATIN C: CPT

## 2025-08-13 LAB
CYSTATIN C: 2.1 MG/L
EGFR: 26 ML/MIN/1.73

## (undated) DIAGNOSIS — E78.00 HYPERCHOLESTEREMIA: Primary | ICD-10-CM

## (undated) NOTE — LETTER
1/24/2018              Davi Parekh        [address] [place] [id number]         Dear Radha Moser,      It was a pleasure to see you at our [address] [facility] office.  Normal pap &  Negative high risk HPV.  Co

## (undated) NOTE — Clinical Note
Roberth Kowalski Patient was asking about labs from 1/13/25, I did reorder bmp today as K was high  Rolandy roxanai Hope all well Asmdaniel

## (undated) NOTE — Clinical Note
Initial assessment completed with patient.  Appointment scheduled for 1/6/25.  However, patient declines additional follow-up calls from nurse care manager.  Thank you!

## (undated) NOTE — LETTER
3/2/2018              Gloria Shirley        [address] [place] [id number]         Dear Naomi Hope,      It was a pleasure to see you at our [facility] office.   Your mammogram is normal. Next in one yea

## (undated) NOTE — ED AVS SNAPSHOT
Thomas Leon   MRN: [id number]    Department:  [facility] Emergency Department   Date of Visit:  2/26/2019           Disclosure     Insurance plans vary and the physician(s) referred by the ER may not be covered by your plan.  Please contact your within the next three months to obtain basic health screening including reassessment of your blood pressure.     IF THERE IS ANY CHANGE OR WORSENING OF YOUR CONDITION, CALL YOUR PRIMARY CARE PHYSICIAN AT ONCE OR RETURN IMMEDIATELY TO THE EMERGENCY DEPARTMEN